# Patient Record
Sex: FEMALE | Race: WHITE | NOT HISPANIC OR LATINO | Employment: OTHER | URBAN - METROPOLITAN AREA
[De-identification: names, ages, dates, MRNs, and addresses within clinical notes are randomized per-mention and may not be internally consistent; named-entity substitution may affect disease eponyms.]

---

## 2019-03-14 ENCOUNTER — TELEPHONE (OUTPATIENT)
Dept: ORTHOPEDIC SURGERY | Facility: CLINIC | Age: 84
End: 2019-03-14

## 2019-03-14 ENCOUNTER — APPOINTMENT (OUTPATIENT)
Dept: GENERAL RADIOLOGY | Facility: HOSPITAL | Age: 84
End: 2019-03-14

## 2019-03-14 ENCOUNTER — HOSPITAL ENCOUNTER (INPATIENT)
Facility: HOSPITAL | Age: 84
LOS: 6 days | Discharge: NURSING, REHAB OR LONG-TERM CARE | End: 2019-03-20
Attending: EMERGENCY MEDICINE | Admitting: ORTHOPAEDIC SURGERY

## 2019-03-14 DIAGNOSIS — Z74.09 IMPAIRED FUNCTIONAL MOBILITY AND ACTIVITY TOLERANCE: ICD-10-CM

## 2019-03-14 DIAGNOSIS — M97.8XXA PERIPROSTHETIC FRACTURE OF FEMUR AT TIP OF PROSTHESIS, INITIAL ENCOUNTER: Primary | ICD-10-CM

## 2019-03-14 DIAGNOSIS — Z96.649 PERIPROSTHETIC FRACTURE OF FEMUR AT TIP OF PROSTHESIS, INITIAL ENCOUNTER: Primary | ICD-10-CM

## 2019-03-14 PROBLEM — I95.9 HYPOTENSION: Status: ACTIVE | Noted: 2019-03-14

## 2019-03-14 PROBLEM — D72.829 LEUKOCYTOSIS: Status: ACTIVE | Noted: 2019-03-14

## 2019-03-14 PROBLEM — S42.412A CLOSED SUPRACONDYLAR FRACTURE OF LEFT HUMERUS: Status: ACTIVE | Noted: 2019-03-14

## 2019-03-14 LAB
ALBUMIN SERPL-MCNC: 4.1 G/DL (ref 3.5–5.2)
ALBUMIN/GLOB SERPL: 1.7 G/DL
ALP SERPL-CCNC: 49 U/L (ref 39–117)
ALT SERPL W P-5'-P-CCNC: 19 U/L (ref 1–33)
ANION GAP SERPL CALCULATED.3IONS-SCNC: 13.1 MMOL/L
APTT PPP: 24.7 SECONDS (ref 22.7–35.4)
AST SERPL-CCNC: 19 U/L (ref 1–32)
BASOPHILS # BLD AUTO: 0.05 10*3/MM3 (ref 0–0.2)
BASOPHILS NFR BLD AUTO: 0.3 % (ref 0–1.5)
BILIRUB SERPL-MCNC: 0.3 MG/DL (ref 0.1–1.2)
BUN BLD-MCNC: 22 MG/DL (ref 8–23)
BUN/CREAT SERPL: 24.4 (ref 7–25)
CALCIUM SPEC-SCNC: 9.7 MG/DL (ref 8.6–10.5)
CHLORIDE SERPL-SCNC: 100 MMOL/L (ref 98–107)
CO2 SERPL-SCNC: 25.9 MMOL/L (ref 22–29)
CREAT BLD-MCNC: 0.9 MG/DL (ref 0.57–1)
DEPRECATED RDW RBC AUTO: 42.3 FL (ref 37–54)
EOSINOPHIL # BLD AUTO: 0.11 10*3/MM3 (ref 0–0.4)
EOSINOPHIL NFR BLD AUTO: 0.6 % (ref 0.3–6.2)
ERYTHROCYTE [DISTWIDTH] IN BLOOD BY AUTOMATED COUNT: 12.4 % (ref 12.3–15.4)
GFR SERPL CREATININE-BSD FRML MDRD: 60 ML/MIN/1.73
GLOBULIN UR ELPH-MCNC: 2.4 GM/DL
GLUCOSE BLD-MCNC: 133 MG/DL (ref 65–99)
HCT VFR BLD AUTO: 35.2 % (ref 34–46.6)
HGB BLD-MCNC: 11.3 G/DL (ref 12–15.9)
IMM GRANULOCYTES # BLD AUTO: 0.13 10*3/MM3 (ref 0–0.05)
IMM GRANULOCYTES NFR BLD AUTO: 0.7 % (ref 0–0.5)
INR PPP: 1.1 (ref 0.9–1.1)
LYMPHOCYTES # BLD AUTO: 2.04 10*3/MM3 (ref 0.7–3.1)
LYMPHOCYTES NFR BLD AUTO: 11.6 % (ref 19.6–45.3)
MCH RBC QN AUTO: 29.9 PG (ref 26.6–33)
MCHC RBC AUTO-ENTMCNC: 32.1 G/DL (ref 31.5–35.7)
MCV RBC AUTO: 93.1 FL (ref 79–97)
MONOCYTES # BLD AUTO: 0.68 10*3/MM3 (ref 0.1–0.9)
MONOCYTES NFR BLD AUTO: 3.9 % (ref 5–12)
NEUTROPHILS # BLD AUTO: 14.54 10*3/MM3 (ref 1.4–7)
NEUTROPHILS NFR BLD AUTO: 82.9 % (ref 42.7–76)
NRBC BLD AUTO-RTO: 0 /100 WBC (ref 0–0)
PLATELET # BLD AUTO: 285 10*3/MM3 (ref 140–450)
PMV BLD AUTO: 10.6 FL (ref 6–12)
POTASSIUM BLD-SCNC: 4.9 MMOL/L (ref 3.5–5.2)
PROT SERPL-MCNC: 6.5 G/DL (ref 6–8.5)
PROTHROMBIN TIME: 14 SECONDS (ref 11.7–14.2)
RBC # BLD AUTO: 3.78 10*6/MM3 (ref 3.77–5.28)
SODIUM BLD-SCNC: 139 MMOL/L (ref 136–145)
WBC NRBC COR # BLD: 17.55 10*3/MM3 (ref 3.4–10.8)

## 2019-03-14 PROCEDURE — 25010000002 MORPHINE PER 10 MG: Performed by: INTERNAL MEDICINE

## 2019-03-14 PROCEDURE — 73060 X-RAY EXAM OF HUMERUS: CPT

## 2019-03-14 PROCEDURE — 85025 COMPLETE CBC W/AUTO DIFF WBC: CPT | Performed by: EMERGENCY MEDICINE

## 2019-03-14 PROCEDURE — 85610 PROTHROMBIN TIME: CPT | Performed by: EMERGENCY MEDICINE

## 2019-03-14 PROCEDURE — 99285 EMERGENCY DEPT VISIT HI MDM: CPT

## 2019-03-14 PROCEDURE — 25010000002 HYDROMORPHONE PER 4 MG: Performed by: EMERGENCY MEDICINE

## 2019-03-14 PROCEDURE — 73552 X-RAY EXAM OF FEMUR 2/>: CPT

## 2019-03-14 PROCEDURE — 25010000002 ONDANSETRON PER 1 MG: Performed by: EMERGENCY MEDICINE

## 2019-03-14 PROCEDURE — 80053 COMPREHEN METABOLIC PANEL: CPT | Performed by: EMERGENCY MEDICINE

## 2019-03-14 PROCEDURE — 85730 THROMBOPLASTIN TIME PARTIAL: CPT | Performed by: EMERGENCY MEDICINE

## 2019-03-14 RX ORDER — ASPIRIN 81 MG/1
81 TABLET, CHEWABLE ORAL DAILY
Status: ON HOLD | COMMUNITY
End: 2019-03-20 | Stop reason: SDUPTHER

## 2019-03-14 RX ORDER — LISINOPRIL 40 MG/1
40 TABLET ORAL DAILY
COMMUNITY
End: 2019-03-20 | Stop reason: HOSPADM

## 2019-03-14 RX ORDER — HYDROCODONE BITARTRATE AND ACETAMINOPHEN 5; 325 MG/1; MG/1
1 TABLET ORAL EVERY 6 HOURS PRN
Status: DISCONTINUED | OUTPATIENT
Start: 2019-03-14 | End: 2019-03-20 | Stop reason: HOSPADM

## 2019-03-14 RX ORDER — SODIUM CHLORIDE 9 MG/ML
125 INJECTION, SOLUTION INTRAVENOUS CONTINUOUS
Status: DISCONTINUED | OUTPATIENT
Start: 2019-03-14 | End: 2019-03-15

## 2019-03-14 RX ORDER — MORPHINE SULFATE 2 MG/ML
4 INJECTION, SOLUTION INTRAMUSCULAR; INTRAVENOUS ONCE
Status: DISCONTINUED | OUTPATIENT
Start: 2019-03-14 | End: 2019-03-14

## 2019-03-14 RX ORDER — PROPRANOLOL HYDROCHLORIDE 40 MG/1
40 TABLET ORAL DAILY
Status: DISCONTINUED | OUTPATIENT
Start: 2019-03-14 | End: 2019-03-20 | Stop reason: HOSPADM

## 2019-03-14 RX ORDER — SODIUM CHLORIDE 0.9 % (FLUSH) 0.9 %
3-10 SYRINGE (ML) INJECTION AS NEEDED
Status: DISCONTINUED | OUTPATIENT
Start: 2019-03-14 | End: 2019-03-20 | Stop reason: HOSPADM

## 2019-03-14 RX ORDER — ONDANSETRON 2 MG/ML
4 INJECTION INTRAMUSCULAR; INTRAVENOUS ONCE
Status: COMPLETED | OUTPATIENT
Start: 2019-03-14 | End: 2019-03-14

## 2019-03-14 RX ORDER — ALBUTEROL SULFATE 2.5 MG/3ML
2.5 SOLUTION RESPIRATORY (INHALATION) EVERY 6 HOURS PRN
Status: DISCONTINUED | OUTPATIENT
Start: 2019-03-14 | End: 2019-03-20 | Stop reason: HOSPADM

## 2019-03-14 RX ORDER — LANOLIN ALCOHOL/MO/W.PET/CERES
400 CREAM (GRAM) TOPICAL DAILY
COMMUNITY

## 2019-03-14 RX ORDER — SODIUM CHLORIDE 0.9 % (FLUSH) 0.9 %
10 SYRINGE (ML) INJECTION AS NEEDED
Status: DISCONTINUED | OUTPATIENT
Start: 2019-03-14 | End: 2019-03-20 | Stop reason: HOSPADM

## 2019-03-14 RX ORDER — PROPRANOLOL HYDROCHLORIDE 40 MG/1
40 TABLET ORAL DAILY
COMMUNITY
End: 2019-12-11 | Stop reason: SDUPTHER

## 2019-03-14 RX ORDER — AMLODIPINE BESYLATE 5 MG/1
10 TABLET ORAL DAILY
Status: CANCELLED | OUTPATIENT
Start: 2019-03-14

## 2019-03-14 RX ORDER — SODIUM CHLORIDE 0.9 % (FLUSH) 0.9 %
3 SYRINGE (ML) INJECTION EVERY 12 HOURS SCHEDULED
Status: DISCONTINUED | OUTPATIENT
Start: 2019-03-14 | End: 2019-03-20 | Stop reason: HOSPADM

## 2019-03-14 RX ORDER — HYDROMORPHONE HYDROCHLORIDE 1 MG/ML
0.5 INJECTION, SOLUTION INTRAMUSCULAR; INTRAVENOUS; SUBCUTANEOUS ONCE
Status: COMPLETED | OUTPATIENT
Start: 2019-03-14 | End: 2019-03-14

## 2019-03-14 RX ORDER — LISINOPRIL 40 MG/1
40 TABLET ORAL DAILY
Status: CANCELLED | OUTPATIENT
Start: 2019-03-14

## 2019-03-14 RX ORDER — AMLODIPINE BESYLATE 10 MG/1
10 TABLET ORAL DAILY
COMMUNITY
End: 2019-03-20 | Stop reason: HOSPADM

## 2019-03-14 RX ORDER — PHENOL 1.4 %
600 AEROSOL, SPRAY (ML) MUCOUS MEMBRANE DAILY
COMMUNITY
End: 2021-10-19

## 2019-03-14 RX ORDER — MORPHINE SULFATE 2 MG/ML
2 INJECTION, SOLUTION INTRAMUSCULAR; INTRAVENOUS EVERY 4 HOURS PRN
Status: DISCONTINUED | OUTPATIENT
Start: 2019-03-14 | End: 2019-03-20 | Stop reason: HOSPADM

## 2019-03-14 RX ADMIN — ONDANSETRON HYDROCHLORIDE 4 MG: 2 SOLUTION INTRAMUSCULAR; INTRAVENOUS at 12:51

## 2019-03-14 RX ADMIN — SODIUM CHLORIDE 500 ML: 9 INJECTION, SOLUTION INTRAVENOUS at 14:32

## 2019-03-14 RX ADMIN — HYDROMORPHONE HYDROCHLORIDE 0.5 MG: 1 INJECTION, SOLUTION INTRAMUSCULAR; INTRAVENOUS; SUBCUTANEOUS at 12:51

## 2019-03-14 RX ADMIN — MORPHINE SULFATE 2 MG: 2 INJECTION, SOLUTION INTRAMUSCULAR; INTRAVENOUS at 22:17

## 2019-03-14 RX ADMIN — SODIUM CHLORIDE 125 ML/HR: 9 INJECTION, SOLUTION INTRAVENOUS at 13:44

## 2019-03-14 RX ADMIN — SODIUM CHLORIDE, PRESERVATIVE FREE 3 ML: 5 INJECTION INTRAVENOUS at 20:00

## 2019-03-14 RX ADMIN — MORPHINE SULFATE 2 MG: 2 INJECTION, SOLUTION INTRAMUSCULAR; INTRAVENOUS at 17:56

## 2019-03-15 ENCOUNTER — APPOINTMENT (OUTPATIENT)
Dept: GENERAL RADIOLOGY | Facility: HOSPITAL | Age: 84
End: 2019-03-15

## 2019-03-15 PROBLEM — N17.9 AKI (ACUTE KIDNEY INJURY) (HCC): Status: ACTIVE | Noted: 2019-03-15

## 2019-03-15 PROBLEM — D62 ACUTE POST-HEMORRHAGIC ANEMIA: Status: ACTIVE | Noted: 2019-03-15

## 2019-03-15 LAB
ABO GROUP BLD: NORMAL
ANION GAP SERPL CALCULATED.3IONS-SCNC: 13.9 MMOL/L
BASOPHILS # BLD AUTO: 0.02 10*3/MM3 (ref 0–0.2)
BASOPHILS NFR BLD AUTO: 0.2 % (ref 0–1.5)
BLD GP AB SCN SERPL QL: NEGATIVE
BUN BLD-MCNC: 30 MG/DL (ref 8–23)
BUN/CREAT SERPL: 22.1 (ref 7–25)
CALCIUM SPEC-SCNC: 8.5 MG/DL (ref 8.6–10.5)
CHLORIDE SERPL-SCNC: 102 MMOL/L (ref 98–107)
CO2 SERPL-SCNC: 20.1 MMOL/L (ref 22–29)
CREAT BLD-MCNC: 1.36 MG/DL (ref 0.57–1)
DEPRECATED RDW RBC AUTO: 45.5 FL (ref 37–54)
EOSINOPHIL # BLD AUTO: 0 10*3/MM3 (ref 0–0.4)
EOSINOPHIL NFR BLD AUTO: 0 % (ref 0.3–6.2)
ERYTHROCYTE [DISTWIDTH] IN BLOOD BY AUTOMATED COUNT: 12.8 % (ref 12.3–15.4)
GFR SERPL CREATININE-BSD FRML MDRD: 37 ML/MIN/1.73
GLUCOSE BLD-MCNC: 115 MG/DL (ref 65–99)
HCT VFR BLD AUTO: 23.1 % (ref 34–46.6)
HGB BLD-MCNC: 7.1 G/DL (ref 12–15.9)
IMM GRANULOCYTES # BLD AUTO: 0.07 10*3/MM3 (ref 0–0.05)
IMM GRANULOCYTES NFR BLD AUTO: 0.6 % (ref 0–0.5)
LYMPHOCYTES # BLD AUTO: 2.62 10*3/MM3 (ref 0.7–3.1)
LYMPHOCYTES NFR BLD AUTO: 21.8 % (ref 19.6–45.3)
MCH RBC QN AUTO: 30.2 PG (ref 26.6–33)
MCHC RBC AUTO-ENTMCNC: 30.7 G/DL (ref 31.5–35.7)
MCV RBC AUTO: 98.3 FL (ref 79–97)
MONOCYTES # BLD AUTO: 1.18 10*3/MM3 (ref 0.1–0.9)
MONOCYTES NFR BLD AUTO: 9.8 % (ref 5–12)
NEUTROPHILS # BLD AUTO: 8.11 10*3/MM3 (ref 1.4–7)
NEUTROPHILS NFR BLD AUTO: 67.6 % (ref 42.7–76)
NRBC BLD AUTO-RTO: 0 /100 WBC (ref 0–0)
PLATELET # BLD AUTO: 154 10*3/MM3 (ref 140–450)
PMV BLD AUTO: 10.4 FL (ref 6–12)
POTASSIUM BLD-SCNC: 5 MMOL/L (ref 3.5–5.2)
RBC # BLD AUTO: 2.35 10*6/MM3 (ref 3.77–5.28)
RH BLD: POSITIVE
SODIUM BLD-SCNC: 136 MMOL/L (ref 136–145)
T&S EXPIRATION DATE: NORMAL
WBC NRBC COR # BLD: 12 10*3/MM3 (ref 3.4–10.8)

## 2019-03-15 PROCEDURE — 85025 COMPLETE CBC W/AUTO DIFF WBC: CPT | Performed by: INTERNAL MEDICINE

## 2019-03-15 PROCEDURE — 80048 BASIC METABOLIC PNL TOTAL CA: CPT | Performed by: NURSE PRACTITIONER

## 2019-03-15 PROCEDURE — P9016 RBC LEUKOCYTES REDUCED: HCPCS

## 2019-03-15 PROCEDURE — 36430 TRANSFUSION BLD/BLD COMPNT: CPT

## 2019-03-15 PROCEDURE — 71045 X-RAY EXAM CHEST 1 VIEW: CPT

## 2019-03-15 PROCEDURE — 86901 BLOOD TYPING SEROLOGIC RH(D): CPT

## 2019-03-15 PROCEDURE — 86901 BLOOD TYPING SEROLOGIC RH(D): CPT | Performed by: ORTHOPAEDIC SURGERY

## 2019-03-15 PROCEDURE — 86850 RBC ANTIBODY SCREEN: CPT | Performed by: ORTHOPAEDIC SURGERY

## 2019-03-15 PROCEDURE — 86900 BLOOD TYPING SEROLOGIC ABO: CPT

## 2019-03-15 PROCEDURE — 25010000002 MORPHINE PER 10 MG: Performed by: INTERNAL MEDICINE

## 2019-03-15 PROCEDURE — 86923 COMPATIBILITY TEST ELECTRIC: CPT

## 2019-03-15 PROCEDURE — 86900 BLOOD TYPING SEROLOGIC ABO: CPT | Performed by: ORTHOPAEDIC SURGERY

## 2019-03-15 RX ORDER — SODIUM CHLORIDE 9 MG/ML
75 INJECTION, SOLUTION INTRAVENOUS CONTINUOUS
Status: DISCONTINUED | OUTPATIENT
Start: 2019-03-15 | End: 2019-03-17

## 2019-03-15 RX ORDER — CYCLOBENZAPRINE HCL 10 MG
5 TABLET ORAL 3 TIMES DAILY PRN
Status: DISCONTINUED | OUTPATIENT
Start: 2019-03-15 | End: 2019-03-20 | Stop reason: HOSPADM

## 2019-03-15 RX ADMIN — MORPHINE SULFATE 2 MG: 2 INJECTION, SOLUTION INTRAMUSCULAR; INTRAVENOUS at 02:28

## 2019-03-15 RX ADMIN — HYDROCODONE BITARTRATE AND ACETAMINOPHEN 1 TABLET: 5; 325 TABLET ORAL at 05:07

## 2019-03-15 RX ADMIN — HYDROCODONE BITARTRATE AND ACETAMINOPHEN 1 TABLET: 5; 325 TABLET ORAL at 23:51

## 2019-03-15 RX ADMIN — CYCLOBENZAPRINE 5 MG: 10 TABLET, FILM COATED ORAL at 22:54

## 2019-03-15 RX ADMIN — SODIUM CHLORIDE 125 ML/HR: 9 INJECTION, SOLUTION INTRAVENOUS at 02:31

## 2019-03-15 RX ADMIN — HYDROCODONE BITARTRATE AND ACETAMINOPHEN 1 TABLET: 5; 325 TABLET ORAL at 19:40

## 2019-03-15 RX ADMIN — SODIUM CHLORIDE 125 ML/HR: 9 INJECTION, SOLUTION INTRAVENOUS at 10:06

## 2019-03-15 RX ADMIN — MORPHINE SULFATE 2 MG: 2 INJECTION, SOLUTION INTRAMUSCULAR; INTRAVENOUS at 15:12

## 2019-03-15 RX ADMIN — MORPHINE SULFATE 2 MG: 2 INJECTION, SOLUTION INTRAMUSCULAR; INTRAVENOUS at 21:40

## 2019-03-15 RX ADMIN — MORPHINE SULFATE 2 MG: 2 INJECTION, SOLUTION INTRAMUSCULAR; INTRAVENOUS at 10:06

## 2019-03-15 RX ADMIN — SODIUM CHLORIDE 75 ML/HR: 9 INJECTION, SOLUTION INTRAVENOUS at 21:41

## 2019-03-15 RX ADMIN — MORPHINE SULFATE 2 MG: 2 INJECTION, SOLUTION INTRAMUSCULAR; INTRAVENOUS at 06:26

## 2019-03-15 RX ADMIN — CYCLOBENZAPRINE 5 MG: 10 TABLET, FILM COATED ORAL at 15:12

## 2019-03-16 ENCOUNTER — ANESTHESIA EVENT (OUTPATIENT)
Dept: PERIOP | Facility: HOSPITAL | Age: 84
End: 2019-03-16

## 2019-03-16 ENCOUNTER — APPOINTMENT (OUTPATIENT)
Dept: GENERAL RADIOLOGY | Facility: HOSPITAL | Age: 84
End: 2019-03-16

## 2019-03-16 ENCOUNTER — ANESTHESIA (OUTPATIENT)
Dept: PERIOP | Facility: HOSPITAL | Age: 84
End: 2019-03-16

## 2019-03-16 LAB
ABO + RH BLD: NORMAL
ABO + RH BLD: NORMAL
ANION GAP SERPL CALCULATED.3IONS-SCNC: 12.7 MMOL/L
BASOPHILS # BLD AUTO: 0.03 10*3/MM3 (ref 0–0.2)
BASOPHILS NFR BLD AUTO: 0.2 % (ref 0–1.5)
BH BB BLOOD EXPIRATION DATE: NORMAL
BH BB BLOOD EXPIRATION DATE: NORMAL
BH BB BLOOD TYPE BARCODE: 7300
BH BB BLOOD TYPE BARCODE: 7300
BH BB DISPENSE STATUS: NORMAL
BH BB DISPENSE STATUS: NORMAL
BH BB PRODUCT CODE: NORMAL
BH BB PRODUCT CODE: NORMAL
BH BB UNIT NUMBER: NORMAL
BH BB UNIT NUMBER: NORMAL
BUN BLD-MCNC: 28 MG/DL (ref 8–23)
BUN/CREAT SERPL: 31.1 (ref 7–25)
CALCIUM SPEC-SCNC: 8.5 MG/DL (ref 8.6–10.5)
CHLORIDE SERPL-SCNC: 102 MMOL/L (ref 98–107)
CO2 SERPL-SCNC: 19.3 MMOL/L (ref 22–29)
CREAT BLD-MCNC: 0.9 MG/DL (ref 0.57–1)
DEPRECATED RDW RBC AUTO: 46.5 FL (ref 37–54)
EOSINOPHIL # BLD AUTO: 0.15 10*3/MM3 (ref 0–0.4)
EOSINOPHIL NFR BLD AUTO: 1.1 % (ref 0.3–6.2)
ERYTHROCYTE [DISTWIDTH] IN BLOOD BY AUTOMATED COUNT: 13.7 % (ref 12.3–15.4)
GFR SERPL CREATININE-BSD FRML MDRD: 60 ML/MIN/1.73
GLUCOSE BLD-MCNC: 118 MG/DL (ref 65–99)
HCT VFR BLD AUTO: 29.6 % (ref 34–46.6)
HCT VFR BLD AUTO: 30.2 % (ref 34–46.6)
HGB BLD-MCNC: 9.5 G/DL (ref 12–15.9)
HGB BLD-MCNC: 9.9 G/DL (ref 12–15.9)
IMM GRANULOCYTES # BLD AUTO: 0.07 10*3/MM3 (ref 0–0.05)
IMM GRANULOCYTES NFR BLD AUTO: 0.5 % (ref 0–0.5)
LYMPHOCYTES # BLD AUTO: 2.46 10*3/MM3 (ref 0.7–3.1)
LYMPHOCYTES NFR BLD AUTO: 17.3 % (ref 19.6–45.3)
MCH RBC QN AUTO: 30 PG (ref 26.6–33)
MCHC RBC AUTO-ENTMCNC: 32.8 G/DL (ref 31.5–35.7)
MCV RBC AUTO: 91.5 FL (ref 79–97)
MONOCYTES # BLD AUTO: 1.7 10*3/MM3 (ref 0.1–0.9)
MONOCYTES NFR BLD AUTO: 11.9 % (ref 5–12)
NEUTROPHILS # BLD AUTO: 9.84 10*3/MM3 (ref 1.4–7)
NEUTROPHILS NFR BLD AUTO: 69 % (ref 42.7–76)
NRBC BLD AUTO-RTO: 0 /100 WBC (ref 0–0)
PLATELET # BLD AUTO: 117 10*3/MM3 (ref 140–450)
PMV BLD AUTO: 10.3 FL (ref 6–12)
POTASSIUM BLD-SCNC: 4.5 MMOL/L (ref 3.5–5.2)
RBC # BLD AUTO: 3.3 10*6/MM3 (ref 3.77–5.28)
SODIUM BLD-SCNC: 134 MMOL/L (ref 136–145)
UNIT  ABO: NORMAL
UNIT  ABO: NORMAL
UNIT  RH: NORMAL
UNIT  RH: NORMAL
WBC NRBC COR # BLD: 14.25 10*3/MM3 (ref 3.4–10.8)

## 2019-03-16 PROCEDURE — C1713 ANCHOR/SCREW BN/BN,TIS/BN: HCPCS | Performed by: ORTHOPAEDIC SURGERY

## 2019-03-16 PROCEDURE — 82803 BLOOD GASES ANY COMBINATION: CPT

## 2019-03-16 PROCEDURE — 23600 CLTX PROX HUMRL FX W/O MNPJ: CPT | Performed by: ORTHOPAEDIC SURGERY

## 2019-03-16 PROCEDURE — 25010000002 PHENYLEPHRINE PER 1 ML: Performed by: NURSE ANESTHETIST, CERTIFIED REGISTERED

## 2019-03-16 PROCEDURE — 25010000003 CEFAZOLIN IN DEXTROSE 2-4 GM/100ML-% SOLUTION: Performed by: ORTHOPAEDIC SURGERY

## 2019-03-16 PROCEDURE — 25010000002 MIDAZOLAM PER 1 MG: Performed by: ANESTHESIOLOGY

## 2019-03-16 PROCEDURE — 85025 COMPLETE CBC W/AUTO DIFF WBC: CPT | Performed by: INTERNAL MEDICINE

## 2019-03-16 PROCEDURE — 25010000002 DEXAMETHASONE PER 1 MG: Performed by: NURSE ANESTHETIST, CERTIFIED REGISTERED

## 2019-03-16 PROCEDURE — 82947 ASSAY GLUCOSE BLOOD QUANT: CPT

## 2019-03-16 PROCEDURE — 85014 HEMATOCRIT: CPT | Performed by: INTERNAL MEDICINE

## 2019-03-16 PROCEDURE — 25010000002 MORPHINE PER 10 MG: Performed by: INTERNAL MEDICINE

## 2019-03-16 PROCEDURE — 76000 FLUOROSCOPY <1 HR PHYS/QHP: CPT

## 2019-03-16 PROCEDURE — 99223 1ST HOSP IP/OBS HIGH 75: CPT | Performed by: ORTHOPAEDIC SURGERY

## 2019-03-16 PROCEDURE — 27506 TREATMENT OF THIGH FRACTURE: CPT | Performed by: ORTHOPAEDIC SURGERY

## 2019-03-16 PROCEDURE — P9016 RBC LEUKOCYTES REDUCED: HCPCS

## 2019-03-16 PROCEDURE — 25010000002 NEOSTIGMINE PER 0.5 MG: Performed by: NURSE ANESTHETIST, CERTIFIED REGISTERED

## 2019-03-16 PROCEDURE — 0QS904Z REPOSITION LEFT FEMORAL SHAFT WITH INTERNAL FIXATION DEVICE, OPEN APPROACH: ICD-10-PCS | Performed by: ORTHOPAEDIC SURGERY

## 2019-03-16 PROCEDURE — 85018 HEMOGLOBIN: CPT

## 2019-03-16 PROCEDURE — 86900 BLOOD TYPING SEROLOGIC ABO: CPT

## 2019-03-16 PROCEDURE — 25010000002 PROPOFOL 10 MG/ML EMULSION: Performed by: NURSE ANESTHETIST, CERTIFIED REGISTERED

## 2019-03-16 PROCEDURE — 80048 BASIC METABOLIC PNL TOTAL CA: CPT | Performed by: INTERNAL MEDICINE

## 2019-03-16 PROCEDURE — 25010000002 FENTANYL CITRATE (PF) 100 MCG/2ML SOLUTION: Performed by: ANESTHESIOLOGY

## 2019-03-16 PROCEDURE — 73552 X-RAY EXAM OF FEMUR 2/>: CPT

## 2019-03-16 PROCEDURE — 36430 TRANSFUSION BLD/BLD COMPNT: CPT

## 2019-03-16 PROCEDURE — L1830 KO IMMOB CANVAS LONG PRE OTS: HCPCS | Performed by: ORTHOPAEDIC SURGERY

## 2019-03-16 PROCEDURE — 85014 HEMATOCRIT: CPT

## 2019-03-16 PROCEDURE — 85018 HEMOGLOBIN: CPT | Performed by: INTERNAL MEDICINE

## 2019-03-16 DEVICE — NCB SCREW 5.0   L = 36
Type: IMPLANTABLE DEVICE | Site: FEMUR | Status: FUNCTIONAL
Brand: NCB®

## 2019-03-16 DEVICE — NCB SCREW 5.0   L = 80
Type: IMPLANTABLE DEVICE | Site: FEMUR | Status: FUNCTIONAL
Brand: NCB®

## 2019-03-16 DEVICE — NCB SCREW D 4.0 SELF-TAPPING, 44
Type: IMPLANTABLE DEVICE | Site: FEMUR | Status: FUNCTIONAL
Brand: NCB®

## 2019-03-16 DEVICE — NCB SCREW D 4.0 SELF-TAPPING, 34
Type: IMPLANTABLE DEVICE | Site: FEMUR | Status: FUNCTIONAL
Brand: NCB®

## 2019-03-16 DEVICE — NCB SCREW 5.0   L = 65
Type: IMPLANTABLE DEVICE | Site: FEMUR | Status: FUNCTIONAL
Brand: NCB®

## 2019-03-16 DEVICE — NCB SCREW 5.0   L = 75
Type: IMPLANTABLE DEVICE | Site: FEMUR | Status: FUNCTIONAL
Brand: NCB®

## 2019-03-16 DEVICE — IMPLANTABLE DEVICE: Type: IMPLANTABLE DEVICE | Site: FEMUR | Status: FUNCTIONAL

## 2019-03-16 DEVICE — NCB SCREW D 4.0 SELF-TAPPING, 36
Type: IMPLANTABLE DEVICE | Site: FEMUR | Status: FUNCTIONAL
Brand: NCB®

## 2019-03-16 DEVICE — NCB SCREW D 4.0 SELF-TAPPING, 30
Type: IMPLANTABLE DEVICE | Site: FEMUR | Status: FUNCTIONAL
Brand: NCB®

## 2019-03-16 DEVICE — NCB CANCELLOUS SCREW 5.0 32 L=75
Type: IMPLANTABLE DEVICE | Site: FEMUR | Status: FUNCTIONAL
Brand: NCB®

## 2019-03-16 DEVICE — NCB SCREW 5.0   L = 70
Type: IMPLANTABLE DEVICE | Site: FEMUR | Status: FUNCTIONAL
Brand: NCB®

## 2019-03-16 DEVICE — NCB LOCKING CAP
Type: IMPLANTABLE DEVICE | Site: FEMUR | Status: FUNCTIONAL
Brand: NCB®

## 2019-03-16 DEVICE — NCB SCREW 5.0   L = 55
Type: IMPLANTABLE DEVICE | Site: FEMUR | Status: FUNCTIONAL
Brand: NCB®

## 2019-03-16 DEVICE — CABL CERCLG GTR COCR 1.8MM 63.5CM: Type: IMPLANTABLE DEVICE | Site: FEMUR | Status: FUNCTIONAL

## 2019-03-16 DEVICE — NCB SCREW D 4.0 SELF-TAPPING, 32
Type: IMPLANTABLE DEVICE | Site: FEMUR | Status: FUNCTIONAL
Brand: NCB®

## 2019-03-16 DEVICE — NCB SCREW 5.0   L = 44
Type: IMPLANTABLE DEVICE | Site: FEMUR | Status: FUNCTIONAL
Brand: NCB®

## 2019-03-16 DEVICE — NCB CLAMP-SCREW
Type: IMPLANTABLE DEVICE | Site: FEMUR | Status: FUNCTIONAL
Brand: NCB®

## 2019-03-16 DEVICE — NCB PP DIST FEM PLATE, L,18 H, L. 355MM
Type: IMPLANTABLE DEVICE | Site: FEMUR | Status: FUNCTIONAL
Brand: NCB®

## 2019-03-16 RX ORDER — FLUMAZENIL 0.1 MG/ML
0.2 INJECTION INTRAVENOUS AS NEEDED
Status: DISCONTINUED | OUTPATIENT
Start: 2019-03-16 | End: 2019-03-16 | Stop reason: HOSPADM

## 2019-03-16 RX ORDER — HYDROMORPHONE HYDROCHLORIDE 1 MG/ML
0.5 INJECTION, SOLUTION INTRAMUSCULAR; INTRAVENOUS; SUBCUTANEOUS
Status: DISCONTINUED | OUTPATIENT
Start: 2019-03-16 | End: 2019-03-16 | Stop reason: HOSPADM

## 2019-03-16 RX ORDER — OXYCODONE AND ACETAMINOPHEN 7.5; 325 MG/1; MG/1
1 TABLET ORAL EVERY 4 HOURS PRN
Status: DISCONTINUED | OUTPATIENT
Start: 2019-03-16 | End: 2019-03-20 | Stop reason: HOSPADM

## 2019-03-16 RX ORDER — FENTANYL CITRATE 50 UG/ML
INJECTION, SOLUTION INTRAMUSCULAR; INTRAVENOUS
Status: COMPLETED
Start: 2019-03-16 | End: 2019-03-16

## 2019-03-16 RX ORDER — PROPOFOL 10 MG/ML
VIAL (ML) INTRAVENOUS AS NEEDED
Status: DISCONTINUED | OUTPATIENT
Start: 2019-03-16 | End: 2019-03-16 | Stop reason: SURG

## 2019-03-16 RX ORDER — SODIUM CHLORIDE, SODIUM LACTATE, POTASSIUM CHLORIDE, CALCIUM CHLORIDE 600; 310; 30; 20 MG/100ML; MG/100ML; MG/100ML; MG/100ML
INJECTION, SOLUTION INTRAVENOUS CONTINUOUS PRN
Status: DISCONTINUED | OUTPATIENT
Start: 2019-03-16 | End: 2019-03-16 | Stop reason: SURG

## 2019-03-16 RX ORDER — EPHEDRINE SULFATE 50 MG/ML
5 INJECTION, SOLUTION INTRAVENOUS ONCE AS NEEDED
Status: DISCONTINUED | OUTPATIENT
Start: 2019-03-16 | End: 2019-03-16 | Stop reason: HOSPADM

## 2019-03-16 RX ORDER — CEFAZOLIN SODIUM 2 G/100ML
2 INJECTION, SOLUTION INTRAVENOUS EVERY 8 HOURS
Status: COMPLETED | OUTPATIENT
Start: 2019-03-16 | End: 2019-03-17

## 2019-03-16 RX ORDER — PROMETHAZINE HYDROCHLORIDE 25 MG/1
25 TABLET ORAL ONCE AS NEEDED
Status: DISCONTINUED | OUTPATIENT
Start: 2019-03-16 | End: 2019-03-16 | Stop reason: HOSPADM

## 2019-03-16 RX ORDER — NALOXONE HCL 0.4 MG/ML
0.1 VIAL (ML) INJECTION
Status: DISCONTINUED | OUTPATIENT
Start: 2019-03-16 | End: 2019-03-20 | Stop reason: HOSPADM

## 2019-03-16 RX ORDER — DEXAMETHASONE SODIUM PHOSPHATE 10 MG/ML
INJECTION INTRAMUSCULAR; INTRAVENOUS AS NEEDED
Status: DISCONTINUED | OUTPATIENT
Start: 2019-03-16 | End: 2019-03-16 | Stop reason: SURG

## 2019-03-16 RX ORDER — PROMETHAZINE HYDROCHLORIDE 25 MG/ML
12.5 INJECTION, SOLUTION INTRAMUSCULAR; INTRAVENOUS ONCE AS NEEDED
Status: CANCELLED | OUTPATIENT
Start: 2019-03-16

## 2019-03-16 RX ORDER — BISACODYL 10 MG
10 SUPPOSITORY, RECTAL RECTAL DAILY PRN
Status: DISCONTINUED | OUTPATIENT
Start: 2019-03-16 | End: 2019-03-20 | Stop reason: HOSPADM

## 2019-03-16 RX ORDER — DOCUSATE SODIUM 100 MG/1
100 CAPSULE, LIQUID FILLED ORAL 2 TIMES DAILY
Status: DISCONTINUED | OUTPATIENT
Start: 2019-03-16 | End: 2019-03-20 | Stop reason: HOSPADM

## 2019-03-16 RX ORDER — FAMOTIDINE 10 MG/ML
20 INJECTION, SOLUTION INTRAVENOUS ONCE
Status: COMPLETED | OUTPATIENT
Start: 2019-03-16 | End: 2019-03-16

## 2019-03-16 RX ORDER — SODIUM CHLORIDE, SODIUM LACTATE, POTASSIUM CHLORIDE, CALCIUM CHLORIDE 600; 310; 30; 20 MG/100ML; MG/100ML; MG/100ML; MG/100ML
100 INJECTION, SOLUTION INTRAVENOUS CONTINUOUS
Status: DISCONTINUED | OUTPATIENT
Start: 2019-03-16 | End: 2019-03-18

## 2019-03-16 RX ORDER — HYDROMORPHONE HYDROCHLORIDE 1 MG/ML
INJECTION, SOLUTION INTRAMUSCULAR; INTRAVENOUS; SUBCUTANEOUS
Status: DISPENSED
Start: 2019-03-16 | End: 2019-03-16

## 2019-03-16 RX ORDER — PROMETHAZINE HYDROCHLORIDE 25 MG/ML
12.5 INJECTION, SOLUTION INTRAMUSCULAR; INTRAVENOUS ONCE AS NEEDED
Status: DISCONTINUED | OUTPATIENT
Start: 2019-03-16 | End: 2019-03-16 | Stop reason: HOSPADM

## 2019-03-16 RX ORDER — ONDANSETRON 2 MG/ML
4 INJECTION INTRAMUSCULAR; INTRAVENOUS ONCE AS NEEDED
Status: DISCONTINUED | OUTPATIENT
Start: 2019-03-16 | End: 2019-03-16 | Stop reason: HOSPADM

## 2019-03-16 RX ORDER — ONDANSETRON 4 MG/1
4 TABLET, ORALLY DISINTEGRATING ORAL EVERY 6 HOURS PRN
Status: DISCONTINUED | OUTPATIENT
Start: 2019-03-16 | End: 2019-03-20 | Stop reason: HOSPADM

## 2019-03-16 RX ORDER — DIPHENHYDRAMINE HCL 25 MG
25 CAPSULE ORAL
Status: DISCONTINUED | OUTPATIENT
Start: 2019-03-16 | End: 2019-03-16 | Stop reason: HOSPADM

## 2019-03-16 RX ORDER — HYDROCODONE BITARTRATE AND ACETAMINOPHEN 5; 325 MG/1; MG/1
1 TABLET ORAL EVERY 4 HOURS PRN
Status: DISCONTINUED | OUTPATIENT
Start: 2019-03-16 | End: 2019-03-20 | Stop reason: HOSPADM

## 2019-03-16 RX ORDER — PROMETHAZINE HYDROCHLORIDE 25 MG/1
25 SUPPOSITORY RECTAL ONCE AS NEEDED
Status: DISCONTINUED | OUTPATIENT
Start: 2019-03-16 | End: 2019-03-16 | Stop reason: HOSPADM

## 2019-03-16 RX ORDER — OXYCODONE AND ACETAMINOPHEN 7.5; 325 MG/1; MG/1
1 TABLET ORAL ONCE AS NEEDED
Status: COMPLETED | OUTPATIENT
Start: 2019-03-16 | End: 2019-03-16

## 2019-03-16 RX ORDER — GLYCOPYRROLATE 0.2 MG/ML
INJECTION INTRAMUSCULAR; INTRAVENOUS AS NEEDED
Status: DISCONTINUED | OUTPATIENT
Start: 2019-03-16 | End: 2019-03-16 | Stop reason: SURG

## 2019-03-16 RX ORDER — FLUMAZENIL 0.1 MG/ML
0.2 INJECTION INTRAVENOUS AS NEEDED
Status: CANCELLED | OUTPATIENT
Start: 2019-03-16

## 2019-03-16 RX ORDER — LIDOCAINE HYDROCHLORIDE 10 MG/ML
0.5 INJECTION, SOLUTION EPIDURAL; INFILTRATION; INTRACAUDAL; PERINEURAL ONCE AS NEEDED
Status: DISCONTINUED | OUTPATIENT
Start: 2019-03-16 | End: 2019-03-16 | Stop reason: HOSPADM

## 2019-03-16 RX ORDER — ACETAMINOPHEN 325 MG/1
650 TABLET ORAL ONCE AS NEEDED
Status: CANCELLED | OUTPATIENT
Start: 2019-03-16

## 2019-03-16 RX ORDER — FENTANYL CITRATE 50 UG/ML
50 INJECTION, SOLUTION INTRAMUSCULAR; INTRAVENOUS
Status: DISCONTINUED | OUTPATIENT
Start: 2019-03-16 | End: 2019-03-16 | Stop reason: HOSPADM

## 2019-03-16 RX ORDER — SODIUM CHLORIDE 0.9 % (FLUSH) 0.9 %
3 SYRINGE (ML) INJECTION EVERY 12 HOURS SCHEDULED
Status: DISCONTINUED | OUTPATIENT
Start: 2019-03-16 | End: 2019-03-16 | Stop reason: HOSPADM

## 2019-03-16 RX ORDER — FENTANYL CITRATE 50 UG/ML
INJECTION, SOLUTION INTRAMUSCULAR; INTRAVENOUS
Status: DISPENSED
Start: 2019-03-16 | End: 2019-03-16

## 2019-03-16 RX ORDER — PROMETHAZINE HYDROCHLORIDE 25 MG/1
25 SUPPOSITORY RECTAL ONCE AS NEEDED
Status: CANCELLED | OUTPATIENT
Start: 2019-03-16

## 2019-03-16 RX ORDER — MIDAZOLAM HYDROCHLORIDE 1 MG/ML
2 INJECTION INTRAMUSCULAR; INTRAVENOUS
Status: DISCONTINUED | OUTPATIENT
Start: 2019-03-16 | End: 2019-03-16 | Stop reason: HOSPADM

## 2019-03-16 RX ORDER — LIDOCAINE HYDROCHLORIDE 40 MG/ML
SOLUTION TOPICAL AS NEEDED
Status: DISCONTINUED | OUTPATIENT
Start: 2019-03-16 | End: 2019-03-16 | Stop reason: SURG

## 2019-03-16 RX ORDER — LIDOCAINE HYDROCHLORIDE 40 MG/ML
SOLUTION TOPICAL
Status: COMPLETED
Start: 2019-03-16 | End: 2019-03-16

## 2019-03-16 RX ORDER — MIDAZOLAM HYDROCHLORIDE 1 MG/ML
1 INJECTION INTRAMUSCULAR; INTRAVENOUS
Status: DISCONTINUED | OUTPATIENT
Start: 2019-03-16 | End: 2019-03-16 | Stop reason: HOSPADM

## 2019-03-16 RX ORDER — HYDROMORPHONE HYDROCHLORIDE 1 MG/ML
0.5 INJECTION, SOLUTION INTRAMUSCULAR; INTRAVENOUS; SUBCUTANEOUS
Status: DISCONTINUED | OUTPATIENT
Start: 2019-03-16 | End: 2019-03-20 | Stop reason: HOSPADM

## 2019-03-16 RX ORDER — ALBUTEROL SULFATE 2.5 MG/3ML
2.5 SOLUTION RESPIRATORY (INHALATION) ONCE AS NEEDED
Status: DISCONTINUED | OUTPATIENT
Start: 2019-03-16 | End: 2019-03-16 | Stop reason: HOSPADM

## 2019-03-16 RX ORDER — CEFAZOLIN SODIUM 2 G/100ML
2 INJECTION, SOLUTION INTRAVENOUS ONCE
Status: COMPLETED | OUTPATIENT
Start: 2019-03-16 | End: 2019-03-16

## 2019-03-16 RX ORDER — OXYCODONE AND ACETAMINOPHEN 7.5; 325 MG/1; MG/1
1 TABLET ORAL ONCE AS NEEDED
Status: CANCELLED | OUTPATIENT
Start: 2019-03-16

## 2019-03-16 RX ORDER — PROMETHAZINE HYDROCHLORIDE 25 MG/1
25 TABLET ORAL ONCE AS NEEDED
Status: CANCELLED | OUTPATIENT
Start: 2019-03-16

## 2019-03-16 RX ORDER — LIDOCAINE HYDROCHLORIDE 20 MG/ML
INJECTION, SOLUTION INFILTRATION; PERINEURAL AS NEEDED
Status: DISCONTINUED | OUTPATIENT
Start: 2019-03-16 | End: 2019-03-16 | Stop reason: SURG

## 2019-03-16 RX ORDER — EPHEDRINE SULFATE 50 MG/ML
5 INJECTION, SOLUTION INTRAVENOUS ONCE AS NEEDED
Status: CANCELLED | OUTPATIENT
Start: 2019-03-16

## 2019-03-16 RX ORDER — HYDROCODONE BITARTRATE AND ACETAMINOPHEN 7.5; 325 MG/1; MG/1
1 TABLET ORAL ONCE AS NEEDED
Status: CANCELLED | OUTPATIENT
Start: 2019-03-16

## 2019-03-16 RX ORDER — NALOXONE HCL 0.4 MG/ML
0.2 VIAL (ML) INJECTION AS NEEDED
Status: DISCONTINUED | OUTPATIENT
Start: 2019-03-16 | End: 2019-03-16 | Stop reason: HOSPADM

## 2019-03-16 RX ORDER — MAGNESIUM HYDROXIDE 1200 MG/15ML
LIQUID ORAL AS NEEDED
Status: DISCONTINUED | OUTPATIENT
Start: 2019-03-16 | End: 2019-03-16 | Stop reason: HOSPADM

## 2019-03-16 RX ORDER — SODIUM CHLORIDE, SODIUM LACTATE, POTASSIUM CHLORIDE, CALCIUM CHLORIDE 600; 310; 30; 20 MG/100ML; MG/100ML; MG/100ML; MG/100ML
9 INJECTION, SOLUTION INTRAVENOUS CONTINUOUS
Status: DISCONTINUED | OUTPATIENT
Start: 2019-03-16 | End: 2019-03-17

## 2019-03-16 RX ORDER — ROCURONIUM BROMIDE 10 MG/ML
INJECTION, SOLUTION INTRAVENOUS AS NEEDED
Status: DISCONTINUED | OUTPATIENT
Start: 2019-03-16 | End: 2019-03-16 | Stop reason: SURG

## 2019-03-16 RX ORDER — HYDROCODONE BITARTRATE AND ACETAMINOPHEN 7.5; 325 MG/1; MG/1
1 TABLET ORAL ONCE AS NEEDED
Status: DISCONTINUED | OUTPATIENT
Start: 2019-03-16 | End: 2019-03-16 | Stop reason: HOSPADM

## 2019-03-16 RX ORDER — NALOXONE HCL 0.4 MG/ML
0.2 VIAL (ML) INJECTION AS NEEDED
Status: CANCELLED | OUTPATIENT
Start: 2019-03-16

## 2019-03-16 RX ORDER — ONDANSETRON 4 MG/1
4 TABLET, FILM COATED ORAL EVERY 6 HOURS PRN
Status: DISCONTINUED | OUTPATIENT
Start: 2019-03-16 | End: 2019-03-20 | Stop reason: HOSPADM

## 2019-03-16 RX ORDER — ACETAMINOPHEN 325 MG/1
325 TABLET ORAL EVERY 4 HOURS PRN
Status: DISCONTINUED | OUTPATIENT
Start: 2019-03-16 | End: 2019-03-20 | Stop reason: HOSPADM

## 2019-03-16 RX ORDER — HYDROCODONE BITARTRATE AND ACETAMINOPHEN 5; 325 MG/1; MG/1
2 TABLET ORAL EVERY 4 HOURS PRN
Status: DISCONTINUED | OUTPATIENT
Start: 2019-03-16 | End: 2019-03-20 | Stop reason: HOSPADM

## 2019-03-16 RX ORDER — FENTANYL CITRATE 50 UG/ML
100 INJECTION, SOLUTION INTRAMUSCULAR; INTRAVENOUS
Status: DISCONTINUED | OUTPATIENT
Start: 2019-03-16 | End: 2019-03-16 | Stop reason: HOSPADM

## 2019-03-16 RX ORDER — ONDANSETRON 2 MG/ML
4 INJECTION INTRAMUSCULAR; INTRAVENOUS EVERY 6 HOURS PRN
Status: DISCONTINUED | OUTPATIENT
Start: 2019-03-16 | End: 2019-03-20 | Stop reason: HOSPADM

## 2019-03-16 RX ORDER — ACETAMINOPHEN 325 MG/1
650 TABLET ORAL ONCE AS NEEDED
Status: DISCONTINUED | OUTPATIENT
Start: 2019-03-16 | End: 2019-03-16 | Stop reason: HOSPADM

## 2019-03-16 RX ORDER — PROMETHAZINE HYDROCHLORIDE 25 MG/ML
6.25 INJECTION, SOLUTION INTRAMUSCULAR; INTRAVENOUS ONCE AS NEEDED
Status: DISCONTINUED | OUTPATIENT
Start: 2019-03-16 | End: 2019-03-16 | Stop reason: HOSPADM

## 2019-03-16 RX ORDER — ONDANSETRON 2 MG/ML
4 INJECTION INTRAMUSCULAR; INTRAVENOUS ONCE AS NEEDED
Status: CANCELLED | OUTPATIENT
Start: 2019-03-16

## 2019-03-16 RX ORDER — EPHEDRINE SULFATE 50 MG/ML
INJECTION, SOLUTION INTRAVENOUS AS NEEDED
Status: DISCONTINUED | OUTPATIENT
Start: 2019-03-16 | End: 2019-03-16 | Stop reason: SURG

## 2019-03-16 RX ORDER — DIPHENHYDRAMINE HCL 25 MG
25 CAPSULE ORAL
Status: CANCELLED | OUTPATIENT
Start: 2019-03-16

## 2019-03-16 RX ORDER — ASPIRIN 325 MG
325 TABLET, DELAYED RELEASE (ENTERIC COATED) ORAL DAILY
Status: DISCONTINUED | OUTPATIENT
Start: 2019-03-17 | End: 2019-03-20 | Stop reason: HOSPADM

## 2019-03-16 RX ORDER — SODIUM CHLORIDE 0.9 % (FLUSH) 0.9 %
1-10 SYRINGE (ML) INJECTION AS NEEDED
Status: DISCONTINUED | OUTPATIENT
Start: 2019-03-16 | End: 2019-03-16 | Stop reason: HOSPADM

## 2019-03-16 RX ORDER — HYDROMORPHONE HYDROCHLORIDE 1 MG/ML
0.5 INJECTION, SOLUTION INTRAMUSCULAR; INTRAVENOUS; SUBCUTANEOUS
Status: CANCELLED | OUTPATIENT
Start: 2019-03-16

## 2019-03-16 RX ORDER — FENTANYL CITRATE 50 UG/ML
50 INJECTION, SOLUTION INTRAMUSCULAR; INTRAVENOUS
Status: CANCELLED | OUTPATIENT
Start: 2019-03-16

## 2019-03-16 RX ADMIN — CEFAZOLIN SODIUM 2 G: 2 INJECTION, SOLUTION INTRAVENOUS at 07:51

## 2019-03-16 RX ADMIN — MORPHINE SULFATE 2 MG: 2 INJECTION, SOLUTION INTRAMUSCULAR; INTRAVENOUS at 03:30

## 2019-03-16 RX ADMIN — OXYCODONE HYDROCHLORIDE AND ACETAMINOPHEN 1 TABLET: 7.5; 325 TABLET ORAL at 11:18

## 2019-03-16 RX ADMIN — SODIUM CHLORIDE, POTASSIUM CHLORIDE, SODIUM LACTATE AND CALCIUM CHLORIDE: 600; 310; 30; 20 INJECTION, SOLUTION INTRAVENOUS at 09:26

## 2019-03-16 RX ADMIN — POLYETHYLENE GLYCOL 3350 17 G: 17 POWDER, FOR SOLUTION ORAL at 17:53

## 2019-03-16 RX ADMIN — FENTANYL CITRATE 25 MCG: 50 INJECTION INTRAMUSCULAR; INTRAVENOUS at 08:36

## 2019-03-16 RX ADMIN — NEOSTIGMINE METHYLSULFATE 3 MG: 1 INJECTION INTRAMUSCULAR; INTRAVENOUS; SUBCUTANEOUS at 10:15

## 2019-03-16 RX ADMIN — GLYCOPYRROLATE 0.6 MG: 0.2 INJECTION INTRAMUSCULAR; INTRAVENOUS at 10:15

## 2019-03-16 RX ADMIN — ROCURONIUM BROMIDE 30 MG: 10 INJECTION INTRAVENOUS at 07:56

## 2019-03-16 RX ADMIN — PROPRANOLOL HYDROCHLORIDE 40 MG: 40 TABLET ORAL at 06:25

## 2019-03-16 RX ADMIN — PROPOFOL 120 MG: 10 INJECTION, EMULSION INTRAVENOUS at 07:56

## 2019-03-16 RX ADMIN — HYDROCODONE BITARTRATE AND ACETAMINOPHEN 1 TABLET: 5; 325 TABLET ORAL at 16:30

## 2019-03-16 RX ADMIN — DEXAMETHASONE SODIUM PHOSPHATE 6 MG: 10 INJECTION INTRAMUSCULAR; INTRAVENOUS at 08:02

## 2019-03-16 RX ADMIN — FAMOTIDINE 20 MG: 10 INJECTION INTRAVENOUS at 07:23

## 2019-03-16 RX ADMIN — EPHEDRINE SULFATE 10 MG: 50 INJECTION INTRAMUSCULAR; INTRAVENOUS; SUBCUTANEOUS at 08:00

## 2019-03-16 RX ADMIN — EPHEDRINE SULFATE 10 MG: 50 INJECTION INTRAMUSCULAR; INTRAVENOUS; SUBCUTANEOUS at 08:03

## 2019-03-16 RX ADMIN — SODIUM CHLORIDE, PRESERVATIVE FREE 3 ML: 5 INJECTION INTRAVENOUS at 21:53

## 2019-03-16 RX ADMIN — PHENYLEPHRINE HYDROCHLORIDE 100 MCG: 10 INJECTION INTRAVENOUS at 09:25

## 2019-03-16 RX ADMIN — HYDROCODONE BITARTRATE AND ACETAMINOPHEN 1 TABLET: 5; 325 TABLET ORAL at 20:50

## 2019-03-16 RX ADMIN — MIDAZOLAM 0.5 MG: 1 INJECTION INTRAMUSCULAR; INTRAVENOUS at 07:23

## 2019-03-16 RX ADMIN — SODIUM CHLORIDE, POTASSIUM CHLORIDE, SODIUM LACTATE AND CALCIUM CHLORIDE: 600; 310; 30; 20 INJECTION, SOLUTION INTRAVENOUS at 06:51

## 2019-03-16 RX ADMIN — LIDOCAINE HYDROCHLORIDE 1 EACH: 40 SOLUTION TOPICAL at 07:57

## 2019-03-16 RX ADMIN — CEFAZOLIN SODIUM 2 G: 2 INJECTION, SOLUTION INTRAVENOUS at 16:52

## 2019-03-16 RX ADMIN — SODIUM CHLORIDE, POTASSIUM CHLORIDE, SODIUM LACTATE AND CALCIUM CHLORIDE 9 ML/HR: 600; 310; 30; 20 INJECTION, SOLUTION INTRAVENOUS at 07:23

## 2019-03-16 RX ADMIN — LIDOCAINE HYDROCHLORIDE 100 MG: 20 INJECTION, SOLUTION INFILTRATION; PERINEURAL at 07:56

## 2019-03-16 RX ADMIN — FENTANYL CITRATE 25 MCG: 50 INJECTION INTRAMUSCULAR; INTRAVENOUS at 07:24

## 2019-03-16 RX ADMIN — PHENYLEPHRINE HYDROCHLORIDE 100 MCG: 10 INJECTION INTRAVENOUS at 08:26

## 2019-03-16 RX ADMIN — SODIUM CHLORIDE 75 ML/HR: 9 INJECTION, SOLUTION INTRAVENOUS at 11:50

## 2019-03-16 RX ADMIN — DOCUSATE SODIUM 100 MG: 100 CAPSULE, LIQUID FILLED ORAL at 20:50

## 2019-03-17 LAB
ABO + RH BLD: NORMAL
ABO + RH BLD: NORMAL
ANION GAP SERPL CALCULATED.3IONS-SCNC: 11.4 MMOL/L
BASOPHILS # BLD AUTO: 0.01 10*3/MM3 (ref 0–0.2)
BASOPHILS NFR BLD AUTO: 0.1 % (ref 0–1.5)
BH BB BLOOD EXPIRATION DATE: NORMAL
BH BB BLOOD EXPIRATION DATE: NORMAL
BH BB BLOOD TYPE BARCODE: 7300
BH BB BLOOD TYPE BARCODE: 7300
BH BB DISPENSE STATUS: NORMAL
BH BB DISPENSE STATUS: NORMAL
BH BB PRODUCT CODE: NORMAL
BH BB PRODUCT CODE: NORMAL
BH BB UNIT NUMBER: NORMAL
BH BB UNIT NUMBER: NORMAL
BUN BLD-MCNC: 22 MG/DL (ref 8–23)
BUN/CREAT SERPL: 28.9 (ref 7–25)
CALCIUM SPEC-SCNC: 8.5 MG/DL (ref 8.6–10.5)
CHLORIDE SERPL-SCNC: 100 MMOL/L (ref 98–107)
CO2 SERPL-SCNC: 21.6 MMOL/L (ref 22–29)
CREAT BLD-MCNC: 0.76 MG/DL (ref 0.57–1)
DEPRECATED RDW RBC AUTO: 45.9 FL (ref 37–54)
EOSINOPHIL # BLD AUTO: 0 10*3/MM3 (ref 0–0.4)
EOSINOPHIL NFR BLD AUTO: 0 % (ref 0.3–6.2)
ERYTHROCYTE [DISTWIDTH] IN BLOOD BY AUTOMATED COUNT: 13.7 % (ref 12.3–15.4)
GFR SERPL CREATININE-BSD FRML MDRD: 72 ML/MIN/1.73
GLUCOSE BLD-MCNC: 118 MG/DL (ref 65–99)
HCT VFR BLD AUTO: 27.7 % (ref 34–46.6)
HCT VFR BLD AUTO: 28.5 % (ref 34–46.6)
HCT VFR BLD AUTO: 29.3 % (ref 34–46.6)
HCT VFR BLD AUTO: 31.9 % (ref 34–46.6)
HGB BLD-MCNC: 10 G/DL (ref 12–15.9)
HGB BLD-MCNC: 8.9 G/DL (ref 12–15.9)
HGB BLD-MCNC: 9.4 G/DL (ref 12–15.9)
HGB BLD-MCNC: 9.7 G/DL (ref 12–15.9)
IMM GRANULOCYTES # BLD AUTO: 0.04 10*3/MM3 (ref 0–0.05)
IMM GRANULOCYTES NFR BLD AUTO: 0.3 % (ref 0–0.5)
LYMPHOCYTES # BLD AUTO: 1.35 10*3/MM3 (ref 0.7–3.1)
LYMPHOCYTES NFR BLD AUTO: 11.2 % (ref 19.6–45.3)
MCH RBC QN AUTO: 30.4 PG (ref 26.6–33)
MCHC RBC AUTO-ENTMCNC: 33 G/DL (ref 31.5–35.7)
MCV RBC AUTO: 92.2 FL (ref 79–97)
MONOCYTES # BLD AUTO: 1.35 10*3/MM3 (ref 0.1–0.9)
MONOCYTES NFR BLD AUTO: 11.2 % (ref 5–12)
NEUTROPHILS # BLD AUTO: 9.29 10*3/MM3 (ref 1.4–7)
NEUTROPHILS NFR BLD AUTO: 77.2 % (ref 42.7–76)
NRBC BLD AUTO-RTO: 0 /100 WBC (ref 0–0)
PLATELET # BLD AUTO: 100 10*3/MM3 (ref 140–450)
PMV BLD AUTO: 10.8 FL (ref 6–12)
POTASSIUM BLD-SCNC: 4.5 MMOL/L (ref 3.5–5.2)
RBC # BLD AUTO: 3.09 10*6/MM3 (ref 3.77–5.28)
SODIUM BLD-SCNC: 133 MMOL/L (ref 136–145)
UNIT  ABO: NORMAL
UNIT  ABO: NORMAL
UNIT  RH: NORMAL
UNIT  RH: NORMAL
WBC NRBC COR # BLD: 12.04 10*3/MM3 (ref 3.4–10.8)

## 2019-03-17 PROCEDURE — 80048 BASIC METABOLIC PNL TOTAL CA: CPT | Performed by: INTERNAL MEDICINE

## 2019-03-17 PROCEDURE — 97110 THERAPEUTIC EXERCISES: CPT

## 2019-03-17 PROCEDURE — 85018 HEMOGLOBIN: CPT | Performed by: INTERNAL MEDICINE

## 2019-03-17 PROCEDURE — 97162 PT EVAL MOD COMPLEX 30 MIN: CPT

## 2019-03-17 PROCEDURE — 85025 COMPLETE CBC W/AUTO DIFF WBC: CPT | Performed by: INTERNAL MEDICINE

## 2019-03-17 PROCEDURE — 25010000003 CEFAZOLIN IN DEXTROSE 2-4 GM/100ML-% SOLUTION: Performed by: ORTHOPAEDIC SURGERY

## 2019-03-17 PROCEDURE — 85014 HEMATOCRIT: CPT | Performed by: INTERNAL MEDICINE

## 2019-03-17 RX ADMIN — CEFAZOLIN SODIUM 2 G: 2 INJECTION, SOLUTION INTRAVENOUS at 00:52

## 2019-03-17 RX ADMIN — PROPRANOLOL HYDROCHLORIDE 40 MG: 40 TABLET ORAL at 08:21

## 2019-03-17 RX ADMIN — POLYETHYLENE GLYCOL 3350 17 G: 17 POWDER, FOR SOLUTION ORAL at 08:22

## 2019-03-17 RX ADMIN — SODIUM CHLORIDE, PRESERVATIVE FREE 3 ML: 5 INJECTION INTRAVENOUS at 20:56

## 2019-03-17 RX ADMIN — HYDROCODONE BITARTRATE AND ACETAMINOPHEN 2 TABLET: 5; 325 TABLET ORAL at 08:22

## 2019-03-17 RX ADMIN — DOCUSATE SODIUM 100 MG: 100 CAPSULE, LIQUID FILLED ORAL at 08:21

## 2019-03-17 RX ADMIN — HYDROCODONE BITARTRATE AND ACETAMINOPHEN 2 TABLET: 5; 325 TABLET ORAL at 21:00

## 2019-03-17 RX ADMIN — DOCUSATE SODIUM 100 MG: 100 CAPSULE, LIQUID FILLED ORAL at 20:56

## 2019-03-17 RX ADMIN — HYDROCODONE BITARTRATE AND ACETAMINOPHEN 2 TABLET: 5; 325 TABLET ORAL at 01:28

## 2019-03-17 RX ADMIN — HYDROCODONE BITARTRATE AND ACETAMINOPHEN 2 TABLET: 5; 325 TABLET ORAL at 15:33

## 2019-03-17 RX ADMIN — SODIUM CHLORIDE, PRESERVATIVE FREE 3 ML: 5 INJECTION INTRAVENOUS at 08:22

## 2019-03-17 RX ADMIN — ASPIRIN 325 MG: 325 TABLET, DELAYED RELEASE ORAL at 08:22

## 2019-03-18 LAB
ANION GAP SERPL CALCULATED.3IONS-SCNC: 8.7 MMOL/L
BASOPHILS # BLD MANUAL: 0.1 10*3/MM3 (ref 0–0.2)
BASOPHILS NFR BLD AUTO: 1 % (ref 0–1.5)
BUN BLD-MCNC: 24 MG/DL (ref 8–23)
BUN/CREAT SERPL: 32.4 (ref 7–25)
BURR CELLS BLD QL SMEAR: ABNORMAL
CALCIUM SPEC-SCNC: 8.1 MG/DL (ref 8.6–10.5)
CHLORIDE SERPL-SCNC: 101 MMOL/L (ref 98–107)
CO2 SERPL-SCNC: 24.3 MMOL/L (ref 22–29)
CREAT BLD-MCNC: 0.74 MG/DL (ref 0.57–1)
DEPRECATED RDW RBC AUTO: 46.5 FL (ref 37–54)
EOSINOPHIL # BLD MANUAL: 0.2 10*3/MM3 (ref 0–0.4)
EOSINOPHIL NFR BLD MANUAL: 2 % (ref 0.3–6.2)
ERYTHROCYTE [DISTWIDTH] IN BLOOD BY AUTOMATED COUNT: 13.7 % (ref 12.3–15.4)
GFR SERPL CREATININE-BSD FRML MDRD: 75 ML/MIN/1.73
GLUCOSE BLD-MCNC: 101 MG/DL (ref 65–99)
HCT VFR BLD AUTO: 25 % (ref 34–46.6)
HCT VFR BLD AUTO: 26.1 % (ref 34–46.6)
HCT VFR BLD AUTO: 26.2 % (ref 34–46.6)
HCT VFR BLD AUTO: 26.5 % (ref 34–46.6)
HGB BLD-MCNC: 8.1 G/DL (ref 12–15.9)
HGB BLD-MCNC: 8.5 G/DL (ref 12–15.9)
HGB BLD-MCNC: 8.6 G/DL (ref 12–15.9)
HGB BLD-MCNC: 8.6 G/DL (ref 12–15.9)
HYPOCHROMIA BLD QL: ABNORMAL
LYMPHOCYTES # BLD MANUAL: 1 10*3/MM3 (ref 0.7–3.1)
LYMPHOCYTES NFR BLD MANUAL: 10 % (ref 19.6–45.3)
LYMPHOCYTES NFR BLD MANUAL: 6 % (ref 5–12)
MCH RBC QN AUTO: 30.3 PG (ref 26.6–33)
MCHC RBC AUTO-ENTMCNC: 32.4 G/DL (ref 31.5–35.7)
MCV RBC AUTO: 93.6 FL (ref 79–97)
MONOCYTES # BLD AUTO: 0.6 10*3/MM3 (ref 0.1–0.9)
NEUTROPHILS # BLD AUTO: 8.09 10*3/MM3 (ref 1.4–7)
NEUTROPHILS NFR BLD MANUAL: 81 % (ref 42.7–76)
PLAT MORPH BLD: NORMAL
PLATELET # BLD AUTO: 117 10*3/MM3 (ref 140–450)
PMV BLD AUTO: 10.4 FL (ref 6–12)
POTASSIUM BLD-SCNC: 4.6 MMOL/L (ref 3.5–5.2)
RBC # BLD AUTO: 2.67 10*6/MM3 (ref 3.77–5.28)
SODIUM BLD-SCNC: 134 MMOL/L (ref 136–145)
WBC MORPH BLD: NORMAL
WBC NRBC COR # BLD: 9.99 10*3/MM3 (ref 3.4–10.8)

## 2019-03-18 PROCEDURE — 85007 BL SMEAR W/DIFF WBC COUNT: CPT | Performed by: INTERNAL MEDICINE

## 2019-03-18 PROCEDURE — 80048 BASIC METABOLIC PNL TOTAL CA: CPT | Performed by: INTERNAL MEDICINE

## 2019-03-18 PROCEDURE — 85018 HEMOGLOBIN: CPT | Performed by: INTERNAL MEDICINE

## 2019-03-18 PROCEDURE — 85014 HEMATOCRIT: CPT | Performed by: INTERNAL MEDICINE

## 2019-03-18 PROCEDURE — 85025 COMPLETE CBC W/AUTO DIFF WBC: CPT | Performed by: INTERNAL MEDICINE

## 2019-03-18 PROCEDURE — 97110 THERAPEUTIC EXERCISES: CPT

## 2019-03-18 RX ORDER — CALCIUM CARBONATE 200(500)MG
2 TABLET,CHEWABLE ORAL 3 TIMES DAILY PRN
Status: DISCONTINUED | OUTPATIENT
Start: 2019-03-18 | End: 2019-03-20 | Stop reason: HOSPADM

## 2019-03-18 RX ORDER — FAMOTIDINE 20 MG/1
20 TABLET, FILM COATED ORAL 2 TIMES DAILY
Status: DISCONTINUED | OUTPATIENT
Start: 2019-03-18 | End: 2019-03-20 | Stop reason: HOSPADM

## 2019-03-18 RX ADMIN — DOCUSATE SODIUM 100 MG: 100 CAPSULE, LIQUID FILLED ORAL at 08:07

## 2019-03-18 RX ADMIN — HYDROCODONE BITARTRATE AND ACETAMINOPHEN 2 TABLET: 5; 325 TABLET ORAL at 14:33

## 2019-03-18 RX ADMIN — DOCUSATE SODIUM 100 MG: 100 CAPSULE, LIQUID FILLED ORAL at 20:07

## 2019-03-18 RX ADMIN — POLYETHYLENE GLYCOL 3350 17 G: 17 POWDER, FOR SOLUTION ORAL at 08:07

## 2019-03-18 RX ADMIN — PROPRANOLOL HYDROCHLORIDE 40 MG: 40 TABLET ORAL at 08:07

## 2019-03-18 RX ADMIN — ASPIRIN 325 MG: 325 TABLET, DELAYED RELEASE ORAL at 08:07

## 2019-03-18 RX ADMIN — HYDROCODONE BITARTRATE AND ACETAMINOPHEN 2 TABLET: 5; 325 TABLET ORAL at 01:35

## 2019-03-18 RX ADMIN — HYDROCODONE BITARTRATE AND ACETAMINOPHEN 2 TABLET: 5; 325 TABLET ORAL at 18:32

## 2019-03-18 RX ADMIN — Medication 2 TABLET: at 12:01

## 2019-03-18 RX ADMIN — HYDROCODONE BITARTRATE AND ACETAMINOPHEN 2 TABLET: 5; 325 TABLET ORAL at 05:30

## 2019-03-18 RX ADMIN — SODIUM CHLORIDE, PRESERVATIVE FREE 3 ML: 5 INJECTION INTRAVENOUS at 08:07

## 2019-03-18 RX ADMIN — HYDROCODONE BITARTRATE AND ACETAMINOPHEN 2 TABLET: 5; 325 TABLET ORAL at 09:54

## 2019-03-18 RX ADMIN — FAMOTIDINE 20 MG: 20 TABLET, FILM COATED ORAL at 20:07

## 2019-03-18 RX ADMIN — SODIUM CHLORIDE, PRESERVATIVE FREE 3 ML: 5 INJECTION INTRAVENOUS at 20:07

## 2019-03-19 LAB
ANION GAP SERPL CALCULATED.3IONS-SCNC: 8.9 MMOL/L
BASE EXCESS BLDA CALC-SCNC: -3 MMOL/L (ref -5–5)
BASOPHILS # BLD AUTO: 0.03 10*3/MM3 (ref 0–0.2)
BASOPHILS NFR BLD AUTO: 0.3 % (ref 0–1.5)
BUN BLD-MCNC: 24 MG/DL (ref 8–23)
BUN/CREAT SERPL: 34.8 (ref 7–25)
CALCIUM SPEC-SCNC: 8.7 MG/DL (ref 8.6–10.5)
CHLORIDE SERPL-SCNC: 98 MMOL/L (ref 98–107)
CO2 BLDA-SCNC: 23 MMOL/L (ref 24–29)
CO2 SERPL-SCNC: 24.1 MMOL/L (ref 22–29)
CREAT BLD-MCNC: 0.69 MG/DL (ref 0.57–1)
DEPRECATED RDW RBC AUTO: 46.7 FL (ref 37–54)
EOSINOPHIL # BLD AUTO: 0.32 10*3/MM3 (ref 0–0.4)
EOSINOPHIL NFR BLD AUTO: 3.7 % (ref 0.3–6.2)
ERYTHROCYTE [DISTWIDTH] IN BLOOD BY AUTOMATED COUNT: 13.4 % (ref 12.3–15.4)
GFR SERPL CREATININE-BSD FRML MDRD: 81 ML/MIN/1.73
GLUCOSE BLD-MCNC: 96 MG/DL (ref 65–99)
GLUCOSE BLDC GLUCOMTR-MCNC: 133 MG/DL (ref 70–130)
HCO3 BLDA-SCNC: 22.2 MMOL/L (ref 22–26)
HCT VFR BLD AUTO: 26.6 % (ref 34–46.6)
HCT VFR BLD AUTO: 27.7 % (ref 34–46.6)
HCT VFR BLD AUTO: 28.3 % (ref 34–46.6)
HCT VFR BLDA CALC: 21 % (ref 38–51)
HGB BLD-MCNC: 8.7 G/DL (ref 12–15.9)
HGB BLD-MCNC: 8.8 G/DL (ref 12–15.9)
HGB BLD-MCNC: 8.9 G/DL (ref 12–15.9)
HGB BLDA-MCNC: 7.1 G/DL (ref 12–17)
IMM GRANULOCYTES # BLD AUTO: 0.04 10*3/MM3 (ref 0–0.05)
IMM GRANULOCYTES NFR BLD AUTO: 0.5 % (ref 0–0.5)
LYMPHOCYTES # BLD AUTO: 1.53 10*3/MM3 (ref 0.7–3.1)
LYMPHOCYTES NFR BLD AUTO: 17.6 % (ref 19.6–45.3)
MCH RBC QN AUTO: 31.1 PG (ref 26.6–33)
MCHC RBC AUTO-ENTMCNC: 32.7 G/DL (ref 31.5–35.7)
MCV RBC AUTO: 95 FL (ref 79–97)
MONOCYTES # BLD AUTO: 0.67 10*3/MM3 (ref 0.1–0.9)
MONOCYTES NFR BLD AUTO: 7.7 % (ref 5–12)
NEUTROPHILS # BLD AUTO: 6.12 10*3/MM3 (ref 1.4–7)
NEUTROPHILS NFR BLD AUTO: 70.2 % (ref 42.7–76)
NRBC BLD AUTO-RTO: 0 /100 WBC (ref 0–0)
OSMOLALITY SERPL: 284 MOSM/KG (ref 280–301)
OSMOLALITY UR: 503 MOSM/KG
PCO2 BLDA: 40.4 MM HG (ref 35–45)
PH BLDA: 7.35 PH UNITS (ref 7.35–7.6)
PLATELET # BLD AUTO: 161 10*3/MM3 (ref 140–450)
PMV BLD AUTO: 10.3 FL (ref 6–12)
PO2 BLDA: 169 MMHG (ref 80–105)
POTASSIUM BLD-SCNC: 4.6 MMOL/L (ref 3.5–5.2)
POTASSIUM BLDA-SCNC: 4.3 MMOL/L (ref 3.5–4.9)
RBC # BLD AUTO: 2.8 10*6/MM3 (ref 3.77–5.28)
SAO2 % BLDA: 99 % (ref 95–98)
SODIUM BLD-SCNC: 131 MMOL/L (ref 136–145)
SODIUM UR-SCNC: 29 MMOL/L
WBC NRBC COR # BLD: 8.71 10*3/MM3 (ref 3.4–10.8)

## 2019-03-19 PROCEDURE — 97110 THERAPEUTIC EXERCISES: CPT

## 2019-03-19 PROCEDURE — 85025 COMPLETE CBC W/AUTO DIFF WBC: CPT | Performed by: INTERNAL MEDICINE

## 2019-03-19 PROCEDURE — 80048 BASIC METABOLIC PNL TOTAL CA: CPT | Performed by: INTERNAL MEDICINE

## 2019-03-19 PROCEDURE — 83935 ASSAY OF URINE OSMOLALITY: CPT | Performed by: INTERNAL MEDICINE

## 2019-03-19 PROCEDURE — 85018 HEMOGLOBIN: CPT | Performed by: INTERNAL MEDICINE

## 2019-03-19 PROCEDURE — 84300 ASSAY OF URINE SODIUM: CPT | Performed by: INTERNAL MEDICINE

## 2019-03-19 PROCEDURE — 85014 HEMATOCRIT: CPT | Performed by: INTERNAL MEDICINE

## 2019-03-19 PROCEDURE — 83930 ASSAY OF BLOOD OSMOLALITY: CPT | Performed by: INTERNAL MEDICINE

## 2019-03-19 RX ADMIN — ASPIRIN 325 MG: 325 TABLET, DELAYED RELEASE ORAL at 08:36

## 2019-03-19 RX ADMIN — FAMOTIDINE 20 MG: 20 TABLET, FILM COATED ORAL at 20:16

## 2019-03-19 RX ADMIN — DOCUSATE SODIUM 100 MG: 100 CAPSULE, LIQUID FILLED ORAL at 20:16

## 2019-03-19 RX ADMIN — DOCUSATE SODIUM 100 MG: 100 CAPSULE, LIQUID FILLED ORAL at 08:36

## 2019-03-19 RX ADMIN — SODIUM CHLORIDE, PRESERVATIVE FREE 3 ML: 5 INJECTION INTRAVENOUS at 20:17

## 2019-03-19 RX ADMIN — FAMOTIDINE 20 MG: 20 TABLET, FILM COATED ORAL at 08:36

## 2019-03-19 RX ADMIN — POLYETHYLENE GLYCOL 3350 17 G: 17 POWDER, FOR SOLUTION ORAL at 08:36

## 2019-03-19 RX ADMIN — HYDROCODONE BITARTRATE AND ACETAMINOPHEN 1 TABLET: 5; 325 TABLET ORAL at 12:06

## 2019-03-19 RX ADMIN — HYDROCODONE BITARTRATE AND ACETAMINOPHEN 2 TABLET: 5; 325 TABLET ORAL at 17:44

## 2019-03-19 RX ADMIN — HYDROCODONE BITARTRATE AND ACETAMINOPHEN 2 TABLET: 5; 325 TABLET ORAL at 05:40

## 2019-03-19 RX ADMIN — HYDROCODONE BITARTRATE AND ACETAMINOPHEN 2 TABLET: 5; 325 TABLET ORAL at 23:42

## 2019-03-19 RX ADMIN — SODIUM CHLORIDE, PRESERVATIVE FREE 3 ML: 5 INJECTION INTRAVENOUS at 08:36

## 2019-03-20 VITALS
OXYGEN SATURATION: 96 % | SYSTOLIC BLOOD PRESSURE: 109 MMHG | BODY MASS INDEX: 23.57 KG/M2 | HEART RATE: 71 BPM | WEIGHT: 150.2 LBS | TEMPERATURE: 97.9 F | RESPIRATION RATE: 16 BRPM | HEIGHT: 67 IN | DIASTOLIC BLOOD PRESSURE: 65 MMHG

## 2019-03-20 PROBLEM — D72.829 LEUKOCYTOSIS: Status: RESOLVED | Noted: 2019-03-14 | Resolved: 2019-03-20

## 2019-03-20 PROBLEM — N17.9 AKI (ACUTE KIDNEY INJURY): Status: RESOLVED | Noted: 2019-03-15 | Resolved: 2019-03-20

## 2019-03-20 LAB
ANION GAP SERPL CALCULATED.3IONS-SCNC: 12.8 MMOL/L
BASOPHILS # BLD AUTO: 0.03 10*3/MM3 (ref 0–0.2)
BASOPHILS NFR BLD AUTO: 0.4 % (ref 0–1.5)
BUN BLD-MCNC: 20 MG/DL (ref 8–23)
BUN/CREAT SERPL: 29.9 (ref 7–25)
CALCIUM SPEC-SCNC: 8.6 MG/DL (ref 8.6–10.5)
CHLORIDE SERPL-SCNC: 98 MMOL/L (ref 98–107)
CO2 SERPL-SCNC: 26.2 MMOL/L (ref 22–29)
CREAT BLD-MCNC: 0.67 MG/DL (ref 0.57–1)
DEPRECATED RDW RBC AUTO: 47.6 FL (ref 37–54)
EOSINOPHIL # BLD AUTO: 0.31 10*3/MM3 (ref 0–0.4)
EOSINOPHIL NFR BLD AUTO: 4.2 % (ref 0.3–6.2)
ERYTHROCYTE [DISTWIDTH] IN BLOOD BY AUTOMATED COUNT: 13.4 % (ref 12.3–15.4)
GFR SERPL CREATININE-BSD FRML MDRD: 84 ML/MIN/1.73
GLUCOSE BLD-MCNC: 104 MG/DL (ref 65–99)
HCT VFR BLD AUTO: 27.6 % (ref 34–46.6)
HCT VFR BLD AUTO: 27.6 % (ref 34–46.6)
HCT VFR BLD AUTO: 27.7 % (ref 34–46.6)
HGB BLD-MCNC: 8.7 G/DL (ref 12–15.9)
HGB BLD-MCNC: 8.7 G/DL (ref 12–15.9)
HGB BLD-MCNC: 8.9 G/DL (ref 12–15.9)
IMM GRANULOCYTES # BLD AUTO: 0.04 10*3/MM3 (ref 0–0.05)
IMM GRANULOCYTES NFR BLD AUTO: 0.5 % (ref 0–0.5)
LYMPHOCYTES # BLD AUTO: 1.44 10*3/MM3 (ref 0.7–3.1)
LYMPHOCYTES NFR BLD AUTO: 19.4 % (ref 19.6–45.3)
MCH RBC QN AUTO: 30.5 PG (ref 26.6–33)
MCHC RBC AUTO-ENTMCNC: 31.5 G/DL (ref 31.5–35.7)
MCV RBC AUTO: 96.8 FL (ref 79–97)
MONOCYTES # BLD AUTO: 0.68 10*3/MM3 (ref 0.1–0.9)
MONOCYTES NFR BLD AUTO: 9.2 % (ref 5–12)
NEUTROPHILS # BLD AUTO: 4.92 10*3/MM3 (ref 1.4–7)
NEUTROPHILS NFR BLD AUTO: 66.3 % (ref 42.7–76)
NRBC BLD AUTO-RTO: 0.1 /100 WBC (ref 0–0)
PLATELET # BLD AUTO: 223 10*3/MM3 (ref 140–450)
PMV BLD AUTO: 9.6 FL (ref 6–12)
POTASSIUM BLD-SCNC: 4.1 MMOL/L (ref 3.5–5.2)
RBC # BLD AUTO: 2.85 10*6/MM3 (ref 3.77–5.28)
SODIUM BLD-SCNC: 137 MMOL/L (ref 136–145)
WBC NRBC COR # BLD: 7.42 10*3/MM3 (ref 3.4–10.8)

## 2019-03-20 PROCEDURE — 85018 HEMOGLOBIN: CPT | Performed by: INTERNAL MEDICINE

## 2019-03-20 PROCEDURE — 85014 HEMATOCRIT: CPT | Performed by: INTERNAL MEDICINE

## 2019-03-20 PROCEDURE — 80048 BASIC METABOLIC PNL TOTAL CA: CPT | Performed by: INTERNAL MEDICINE

## 2019-03-20 PROCEDURE — 85025 COMPLETE CBC W/AUTO DIFF WBC: CPT | Performed by: INTERNAL MEDICINE

## 2019-03-20 RX ORDER — ASPIRIN 81 MG/1
81 TABLET, CHEWABLE ORAL DAILY
Start: 2019-04-11 | End: 2019-12-11

## 2019-03-20 RX ORDER — HYDROCODONE BITARTRATE AND ACETAMINOPHEN 5; 325 MG/1; MG/1
1-2 TABLET ORAL EVERY 6 HOURS PRN
Qty: 24 TABLET | Refills: 0 | Status: SHIPPED | OUTPATIENT
Start: 2019-03-20 | End: 2019-03-24

## 2019-03-20 RX ORDER — DOXYCYCLINE HYCLATE 50 MG/1
324 CAPSULE, GELATIN COATED ORAL
Start: 2019-03-20 | End: 2019-04-19

## 2019-03-20 RX ORDER — FAMOTIDINE 20 MG/1
20 TABLET, FILM COATED ORAL 2 TIMES DAILY
Start: 2019-03-20 | End: 2021-10-19

## 2019-03-20 RX ORDER — ONDANSETRON 4 MG/1
4 TABLET, ORALLY DISINTEGRATING ORAL EVERY 6 HOURS PRN
Start: 2019-03-20 | End: 2021-04-21

## 2019-03-20 RX ADMIN — HYDROCODONE BITARTRATE AND ACETAMINOPHEN 1 TABLET: 5; 325 TABLET ORAL at 12:40

## 2019-03-20 RX ADMIN — POLYETHYLENE GLYCOL 3350 17 G: 17 POWDER, FOR SOLUTION ORAL at 08:57

## 2019-03-20 RX ADMIN — HYDROCODONE BITARTRATE AND ACETAMINOPHEN 2 TABLET: 5; 325 TABLET ORAL at 04:40

## 2019-03-20 RX ADMIN — HYDROCODONE BITARTRATE AND ACETAMINOPHEN 2 TABLET: 5; 325 TABLET ORAL at 08:57

## 2019-03-20 RX ADMIN — SODIUM CHLORIDE, PRESERVATIVE FREE 3 ML: 5 INJECTION INTRAVENOUS at 08:57

## 2019-03-20 RX ADMIN — ASPIRIN 325 MG: 325 TABLET, DELAYED RELEASE ORAL at 08:58

## 2019-03-20 RX ADMIN — DOCUSATE SODIUM 100 MG: 100 CAPSULE, LIQUID FILLED ORAL at 08:57

## 2019-03-20 RX ADMIN — PROPRANOLOL HYDROCHLORIDE 40 MG: 40 TABLET ORAL at 08:58

## 2019-03-20 RX ADMIN — FAMOTIDINE 20 MG: 20 TABLET, FILM COATED ORAL at 08:58

## 2019-03-27 ENCOUNTER — TELEPHONE (OUTPATIENT)
Dept: ORTHOPEDIC SURGERY | Facility: CLINIC | Age: 84
End: 2019-03-27

## 2019-04-03 ENCOUNTER — OFFICE VISIT (OUTPATIENT)
Dept: ORTHOPEDIC SURGERY | Facility: CLINIC | Age: 84
End: 2019-04-03

## 2019-04-03 DIAGNOSIS — Z09 FRACTURE FOLLOW-UP: Primary | ICD-10-CM

## 2019-04-03 PROCEDURE — 99024 POSTOP FOLLOW-UP VISIT: CPT | Performed by: ORTHOPAEDIC SURGERY

## 2019-04-03 PROCEDURE — 73502 X-RAY EXAM HIP UNI 2-3 VIEWS: CPT | Performed by: ORTHOPAEDIC SURGERY

## 2019-04-03 PROCEDURE — 73560 X-RAY EXAM OF KNEE 1 OR 2: CPT | Performed by: ORTHOPAEDIC SURGERY

## 2019-04-03 PROCEDURE — 73030 X-RAY EXAM OF SHOULDER: CPT | Performed by: ORTHOPAEDIC SURGERY

## 2019-04-04 ENCOUNTER — TELEPHONE (OUTPATIENT)
Dept: ORTHOPEDIC SURGERY | Facility: CLINIC | Age: 84
End: 2019-04-04

## 2019-04-08 ENCOUNTER — TELEPHONE (OUTPATIENT)
Dept: ORTHOPEDIC SURGERY | Facility: CLINIC | Age: 84
End: 2019-04-08

## 2019-05-01 ENCOUNTER — OFFICE VISIT (OUTPATIENT)
Dept: ORTHOPEDIC SURGERY | Facility: CLINIC | Age: 84
End: 2019-05-01

## 2019-05-01 VITALS — HEIGHT: 67 IN | BODY MASS INDEX: 23.7 KG/M2 | WEIGHT: 151 LBS | TEMPERATURE: 99.7 F

## 2019-05-01 DIAGNOSIS — Z09 SURGERY FOLLOW-UP: Primary | ICD-10-CM

## 2019-05-01 PROCEDURE — 73030 X-RAY EXAM OF SHOULDER: CPT | Performed by: ORTHOPAEDIC SURGERY

## 2019-05-01 PROCEDURE — 99024 POSTOP FOLLOW-UP VISIT: CPT | Performed by: ORTHOPAEDIC SURGERY

## 2019-05-01 PROCEDURE — 73552 X-RAY EXAM OF FEMUR 2/>: CPT | Performed by: ORTHOPAEDIC SURGERY

## 2019-05-01 RX ORDER — ACETAMINOPHEN 325 MG/1
650 TABLET ORAL EVERY 6 HOURS PRN
COMMUNITY
End: 2021-10-19

## 2019-05-06 ENCOUNTER — TELEPHONE (OUTPATIENT)
Dept: ORTHOPEDIC SURGERY | Facility: CLINIC | Age: 84
End: 2019-05-06

## 2019-05-14 ENCOUNTER — TELEPHONE (OUTPATIENT)
Dept: ORTHOPEDIC SURGERY | Facility: CLINIC | Age: 84
End: 2019-05-14

## 2019-05-14 DIAGNOSIS — Z96.649 PERIPROSTHETIC FRACTURE OF SHAFT OF FEMUR: ICD-10-CM

## 2019-05-14 DIAGNOSIS — M97.8XXA PERIPROSTHETIC FRACTURE OF SHAFT OF FEMUR: ICD-10-CM

## 2019-05-14 DIAGNOSIS — S42.202D CLOSED FRACTURE OF PROXIMAL END OF LEFT HUMERUS WITH ROUTINE HEALING, UNSPECIFIED FRACTURE MORPHOLOGY, SUBSEQUENT ENCOUNTER: Primary | ICD-10-CM

## 2019-05-16 ENCOUNTER — TELEPHONE (OUTPATIENT)
Dept: ORTHOPEDIC SURGERY | Facility: CLINIC | Age: 84
End: 2019-05-16

## 2019-05-20 ENCOUNTER — TELEPHONE (OUTPATIENT)
Dept: ORTHOPEDIC SURGERY | Facility: CLINIC | Age: 84
End: 2019-05-20

## 2019-06-12 ENCOUNTER — OFFICE VISIT (OUTPATIENT)
Dept: ORTHOPEDIC SURGERY | Facility: CLINIC | Age: 84
End: 2019-06-12

## 2019-06-12 VITALS — BODY MASS INDEX: 23.54 KG/M2 | TEMPERATURE: 98 F | WEIGHT: 150 LBS | HEIGHT: 67 IN

## 2019-06-12 DIAGNOSIS — Z09 SURGERY FOLLOW-UP: Primary | ICD-10-CM

## 2019-06-12 PROCEDURE — 73030 X-RAY EXAM OF SHOULDER: CPT | Performed by: ORTHOPAEDIC SURGERY

## 2019-06-12 PROCEDURE — 73552 X-RAY EXAM OF FEMUR 2/>: CPT | Performed by: ORTHOPAEDIC SURGERY

## 2019-06-12 PROCEDURE — 99024 POSTOP FOLLOW-UP VISIT: CPT | Performed by: ORTHOPAEDIC SURGERY

## 2019-07-24 ENCOUNTER — OFFICE VISIT (OUTPATIENT)
Dept: ORTHOPEDIC SURGERY | Facility: CLINIC | Age: 84
End: 2019-07-24

## 2019-07-24 VITALS — BODY MASS INDEX: 23.54 KG/M2 | HEIGHT: 67 IN | WEIGHT: 150 LBS | TEMPERATURE: 98.2 F

## 2019-07-24 DIAGNOSIS — S42.202D CLOSED FRACTURE OF PROXIMAL END OF LEFT HUMERUS WITH ROUTINE HEALING, UNSPECIFIED FRACTURE MORPHOLOGY, SUBSEQUENT ENCOUNTER: ICD-10-CM

## 2019-07-24 DIAGNOSIS — M97.8XXA PERIPROSTHETIC FRACTURE OF FEMUR AT TIP OF PROSTHESIS, INITIAL ENCOUNTER: Primary | ICD-10-CM

## 2019-07-24 DIAGNOSIS — Z96.649 PERIPROSTHETIC FRACTURE OF FEMUR AT TIP OF PROSTHESIS, INITIAL ENCOUNTER: Primary | ICD-10-CM

## 2019-07-24 PROCEDURE — 99212 OFFICE O/P EST SF 10 MIN: CPT | Performed by: ORTHOPAEDIC SURGERY

## 2019-07-24 PROCEDURE — 73552 X-RAY EXAM OF FEMUR 2/>: CPT | Performed by: ORTHOPAEDIC SURGERY

## 2019-08-02 ENCOUNTER — TELEPHONE (OUTPATIENT)
Dept: ORTHOPEDIC SURGERY | Facility: CLINIC | Age: 84
End: 2019-08-02

## 2019-09-09 ENCOUNTER — OFFICE VISIT (OUTPATIENT)
Dept: ORTHOPEDIC SURGERY | Facility: CLINIC | Age: 84
End: 2019-09-09

## 2019-09-09 VITALS — BODY MASS INDEX: 23.54 KG/M2 | TEMPERATURE: 97.9 F | WEIGHT: 150 LBS | HEIGHT: 67 IN

## 2019-09-09 DIAGNOSIS — Z96.649 PERIPROSTHETIC FRACTURE OF FEMUR AT TIP OF PROSTHESIS, INITIAL ENCOUNTER: Primary | ICD-10-CM

## 2019-09-09 DIAGNOSIS — M97.8XXA PERIPROSTHETIC FRACTURE OF FEMUR AT TIP OF PROSTHESIS, INITIAL ENCOUNTER: Primary | ICD-10-CM

## 2019-09-09 PROCEDURE — 99212 OFFICE O/P EST SF 10 MIN: CPT | Performed by: ORTHOPAEDIC SURGERY

## 2019-09-09 PROCEDURE — 73552 X-RAY EXAM OF FEMUR 2/>: CPT | Performed by: ORTHOPAEDIC SURGERY

## 2019-09-10 ENCOUNTER — TELEPHONE (OUTPATIENT)
Dept: ORTHOPEDIC SURGERY | Facility: CLINIC | Age: 84
End: 2019-09-10

## 2019-09-10 DIAGNOSIS — M97.8XXA PERIPROSTHETIC FRACTURE OF SHAFT OF FEMUR: Primary | ICD-10-CM

## 2019-09-10 DIAGNOSIS — Z96.649 PERIPROSTHETIC FRACTURE OF SHAFT OF FEMUR: Primary | ICD-10-CM

## 2019-09-12 ENCOUNTER — TELEPHONE (OUTPATIENT)
Dept: ORTHOPEDIC SURGERY | Facility: CLINIC | Age: 84
End: 2019-09-12

## 2019-09-13 ENCOUNTER — TELEPHONE (OUTPATIENT)
Dept: ORTHOPEDIC SURGERY | Facility: CLINIC | Age: 84
End: 2019-09-13

## 2019-09-13 DIAGNOSIS — M97.8XXA PERIPROSTHETIC FRACTURE OF SHAFT OF FEMUR: ICD-10-CM

## 2019-09-13 DIAGNOSIS — S72.362K: Primary | ICD-10-CM

## 2019-09-13 DIAGNOSIS — Z96.649 PERIPROSTHETIC FRACTURE OF SHAFT OF FEMUR: ICD-10-CM

## 2019-09-16 ENCOUNTER — TELEPHONE (OUTPATIENT)
Dept: ORTHOPEDIC SURGERY | Facility: CLINIC | Age: 84
End: 2019-09-16

## 2019-09-17 ENCOUNTER — TELEPHONE (OUTPATIENT)
Dept: ORTHOPEDIC SURGERY | Facility: CLINIC | Age: 84
End: 2019-09-17

## 2019-09-26 RX ORDER — PROPRANOLOL HYDROCHLORIDE 20 MG/1
TABLET ORAL
Qty: 270 TABLET | Refills: 1 | OUTPATIENT
Start: 2019-09-26

## 2019-09-26 RX ORDER — LISINOPRIL 40 MG/1
TABLET ORAL
Qty: 90 TABLET | Refills: 1 | OUTPATIENT
Start: 2019-09-26

## 2019-10-02 ENCOUNTER — TELEPHONE (OUTPATIENT)
Dept: ORTHOPEDIC SURGERY | Facility: CLINIC | Age: 84
End: 2019-10-02

## 2019-10-02 DIAGNOSIS — Z96.649 PERIPROSTHETIC FRACTURE OF FEMUR AT TIP OF PROSTHESIS, INITIAL ENCOUNTER: Primary | ICD-10-CM

## 2019-10-02 DIAGNOSIS — M97.8XXA PERIPROSTHETIC FRACTURE OF FEMUR AT TIP OF PROSTHESIS, INITIAL ENCOUNTER: Primary | ICD-10-CM

## 2019-10-30 RX ORDER — LISINOPRIL 40 MG/1
TABLET ORAL
Qty: 90 TABLET | Refills: 1 | Status: SHIPPED | OUTPATIENT
Start: 2019-10-30 | End: 2019-12-11 | Stop reason: SDUPTHER

## 2019-11-27 RX ORDER — AMLODIPINE BESYLATE 10 MG/1
TABLET ORAL
Qty: 90 TABLET | Refills: 0 | OUTPATIENT
Start: 2019-11-27

## 2019-12-03 RX ORDER — AMLODIPINE BESYLATE 10 MG/1
TABLET ORAL
Qty: 90 TABLET | Refills: 0 | OUTPATIENT
Start: 2019-12-03

## 2019-12-03 RX ORDER — PROPRANOLOL HYDROCHLORIDE 20 MG/1
TABLET ORAL
Qty: 270 TABLET | Refills: 0 | OUTPATIENT
Start: 2019-12-03

## 2019-12-09 ENCOUNTER — OFFICE VISIT (OUTPATIENT)
Dept: ORTHOPEDIC SURGERY | Facility: CLINIC | Age: 84
End: 2019-12-09

## 2019-12-09 VITALS — WEIGHT: 153 LBS | HEIGHT: 67 IN | BODY MASS INDEX: 24.01 KG/M2 | TEMPERATURE: 98.3 F

## 2019-12-09 DIAGNOSIS — M97.8XXA PERIPROSTHETIC FRACTURE OF FEMUR AT TIP OF PROSTHESIS, INITIAL ENCOUNTER: Primary | ICD-10-CM

## 2019-12-09 DIAGNOSIS — Z96.649 PERIPROSTHETIC FRACTURE OF FEMUR AT TIP OF PROSTHESIS, INITIAL ENCOUNTER: Primary | ICD-10-CM

## 2019-12-09 PROCEDURE — 99212 OFFICE O/P EST SF 10 MIN: CPT | Performed by: ORTHOPAEDIC SURGERY

## 2019-12-09 PROCEDURE — 73552 X-RAY EXAM OF FEMUR 2/>: CPT | Performed by: ORTHOPAEDIC SURGERY

## 2019-12-10 ENCOUNTER — TELEPHONE (OUTPATIENT)
Dept: ORTHOPEDIC SURGERY | Facility: CLINIC | Age: 84
End: 2019-12-10

## 2019-12-11 ENCOUNTER — OFFICE VISIT (OUTPATIENT)
Dept: FAMILY MEDICINE CLINIC | Facility: CLINIC | Age: 84
End: 2019-12-11

## 2019-12-11 VITALS
OXYGEN SATURATION: 98 % | SYSTOLIC BLOOD PRESSURE: 116 MMHG | BODY MASS INDEX: 24.8 KG/M2 | HEART RATE: 61 BPM | WEIGHT: 158 LBS | DIASTOLIC BLOOD PRESSURE: 72 MMHG | HEIGHT: 67 IN | TEMPERATURE: 98 F

## 2019-12-11 DIAGNOSIS — Z00.00 MEDICARE ANNUAL WELLNESS VISIT, SUBSEQUENT: ICD-10-CM

## 2019-12-11 DIAGNOSIS — I10 BENIGN ESSENTIAL HYPERTENSION: Primary | ICD-10-CM

## 2019-12-11 DIAGNOSIS — M81.0 AGE-RELATED OSTEOPOROSIS WITHOUT CURRENT PATHOLOGICAL FRACTURE: ICD-10-CM

## 2019-12-11 PROCEDURE — G0439 PPPS, SUBSEQ VISIT: HCPCS | Performed by: FAMILY MEDICINE

## 2019-12-11 PROCEDURE — 96160 PT-FOCUSED HLTH RISK ASSMT: CPT | Performed by: FAMILY MEDICINE

## 2019-12-11 RX ORDER — CETIRIZINE HYDROCHLORIDE 10 MG/1
10 TABLET ORAL DAILY
COMMUNITY
End: 2021-04-21

## 2019-12-11 RX ORDER — AMLODIPINE BESYLATE 10 MG/1
10 TABLET ORAL DAILY
Qty: 90 TABLET | Refills: 3 | Status: SHIPPED | OUTPATIENT
Start: 2019-12-11 | End: 2020-04-17 | Stop reason: SDUPTHER

## 2019-12-11 RX ORDER — LISINOPRIL 40 MG/1
40 TABLET ORAL DAILY
Qty: 90 TABLET | Refills: 3 | Status: SHIPPED | OUTPATIENT
Start: 2019-12-11 | End: 2020-04-17 | Stop reason: SDUPTHER

## 2019-12-11 RX ORDER — PROPRANOLOL HYDROCHLORIDE 40 MG/1
40 TABLET ORAL 3 TIMES DAILY
Qty: 270 TABLET | Refills: 3 | Status: SHIPPED | OUTPATIENT
Start: 2019-12-11 | End: 2020-04-17 | Stop reason: SDUPTHER

## 2019-12-11 RX ORDER — MULTIPLE VITAMINS W/ MINERALS TAB 9MG-400MCG
1 TAB ORAL DAILY
COMMUNITY

## 2019-12-11 RX ORDER — AMLODIPINE BESYLATE 10 MG/1
10 TABLET ORAL DAILY
COMMUNITY
End: 2019-12-11 | Stop reason: SDUPTHER

## 2019-12-12 LAB
ALBUMIN SERPL-MCNC: 4.4 G/DL (ref 3.5–5.2)
ALBUMIN/GLOB SERPL: 1.7 G/DL
ALP SERPL-CCNC: 54 U/L (ref 39–117)
ALT SERPL-CCNC: 12 U/L (ref 1–33)
AST SERPL-CCNC: 15 U/L (ref 1–32)
BILIRUB SERPL-MCNC: 0.3 MG/DL (ref 0.2–1.2)
BUN SERPL-MCNC: 21 MG/DL (ref 8–23)
BUN/CREAT SERPL: 26.3 (ref 7–25)
CALCIUM SERPL-MCNC: 9.9 MG/DL (ref 8.6–10.5)
CHLORIDE SERPL-SCNC: 98 MMOL/L (ref 98–107)
CHOLEST SERPL-MCNC: 211 MG/DL (ref 0–200)
CO2 SERPL-SCNC: 30.5 MMOL/L (ref 22–29)
CREAT SERPL-MCNC: 0.8 MG/DL (ref 0.57–1)
GLOBULIN SER CALC-MCNC: 2.6 GM/DL
GLUCOSE SERPL-MCNC: 81 MG/DL (ref 65–99)
HDLC SERPL-MCNC: 65 MG/DL (ref 40–60)
LDLC SERPL CALC-MCNC: 128 MG/DL (ref 0–100)
POTASSIUM SERPL-SCNC: 4.5 MMOL/L (ref 3.5–5.2)
PROT SERPL-MCNC: 7 G/DL (ref 6–8.5)
SODIUM SERPL-SCNC: 138 MMOL/L (ref 136–145)
TRIGL SERPL-MCNC: 92 MG/DL (ref 0–150)
VLDLC SERPL CALC-MCNC: 18.4 MG/DL

## 2020-01-08 ENCOUNTER — TELEPHONE (OUTPATIENT)
Dept: ORTHOPEDIC SURGERY | Facility: CLINIC | Age: 85
End: 2020-01-08

## 2020-03-09 ENCOUNTER — OFFICE VISIT (OUTPATIENT)
Dept: ORTHOPEDIC SURGERY | Facility: CLINIC | Age: 85
End: 2020-03-09

## 2020-03-09 VITALS — TEMPERATURE: 98.4 F | BODY MASS INDEX: 24.33 KG/M2 | HEIGHT: 67 IN | WEIGHT: 155 LBS

## 2020-03-09 DIAGNOSIS — M97.8XXA PERIPROSTHETIC FRACTURE OF FEMUR AT TIP OF PROSTHESIS, INITIAL ENCOUNTER: Primary | ICD-10-CM

## 2020-03-09 DIAGNOSIS — Z96.649 PERIPROSTHETIC FRACTURE OF FEMUR AT TIP OF PROSTHESIS, INITIAL ENCOUNTER: Primary | ICD-10-CM

## 2020-03-09 PROCEDURE — 99212 OFFICE O/P EST SF 10 MIN: CPT | Performed by: ORTHOPAEDIC SURGERY

## 2020-03-09 PROCEDURE — 73552 X-RAY EXAM OF FEMUR 2/>: CPT | Performed by: ORTHOPAEDIC SURGERY

## 2020-04-17 DIAGNOSIS — I10 BENIGN ESSENTIAL HYPERTENSION: ICD-10-CM

## 2020-04-17 RX ORDER — LISINOPRIL 40 MG/1
40 TABLET ORAL DAILY
Qty: 90 TABLET | Refills: 0 | Status: SHIPPED | OUTPATIENT
Start: 2020-04-17 | End: 2020-10-20

## 2020-04-17 RX ORDER — AMLODIPINE BESYLATE 10 MG/1
10 TABLET ORAL DAILY
Qty: 90 TABLET | Refills: 0 | Status: SHIPPED | OUTPATIENT
Start: 2020-04-17 | End: 2020-07-28

## 2020-04-17 RX ORDER — PROPRANOLOL HYDROCHLORIDE 40 MG/1
40 TABLET ORAL 3 TIMES DAILY
Qty: 270 TABLET | Refills: 0 | Status: SHIPPED | OUTPATIENT
Start: 2020-04-17 | End: 2020-10-20

## 2020-06-16 ENCOUNTER — OFFICE VISIT (OUTPATIENT)
Dept: FAMILY MEDICINE CLINIC | Facility: CLINIC | Age: 85
End: 2020-06-16

## 2020-06-16 VITALS
BODY MASS INDEX: 24.96 KG/M2 | HEART RATE: 54 BPM | DIASTOLIC BLOOD PRESSURE: 72 MMHG | OXYGEN SATURATION: 99 % | TEMPERATURE: 98.2 F | HEIGHT: 67 IN | WEIGHT: 159 LBS | SYSTOLIC BLOOD PRESSURE: 146 MMHG

## 2020-06-16 DIAGNOSIS — I10 BENIGN ESSENTIAL HYPERTENSION: ICD-10-CM

## 2020-06-16 DIAGNOSIS — M97.8XXA PERIPROSTHETIC FRACTURE OF FEMUR AT TIP OF PROSTHESIS, INITIAL ENCOUNTER: Primary | ICD-10-CM

## 2020-06-16 DIAGNOSIS — Z96.649 PERIPROSTHETIC FRACTURE OF FEMUR AT TIP OF PROSTHESIS, INITIAL ENCOUNTER: Primary | ICD-10-CM

## 2020-06-16 DIAGNOSIS — M81.0 AGE-RELATED OSTEOPOROSIS WITHOUT CURRENT PATHOLOGICAL FRACTURE: ICD-10-CM

## 2020-06-16 PROCEDURE — 99214 OFFICE O/P EST MOD 30 MIN: CPT | Performed by: FAMILY MEDICINE

## 2020-06-16 RX ORDER — CARBAMAZEPINE 100 MG/1
TABLET, CHEWABLE ORAL AS NEEDED
COMMUNITY
End: 2020-12-07 | Stop reason: SDUPTHER

## 2020-06-16 RX ORDER — MECLIZINE HYDROCHLORIDE 25 MG/1
25 TABLET ORAL AS NEEDED
COMMUNITY
End: 2020-10-21 | Stop reason: SDUPTHER

## 2020-06-17 LAB
ALBUMIN SERPL-MCNC: 4.7 G/DL (ref 3.5–5.2)
ALBUMIN/GLOB SERPL: 1.7 G/DL
ALP SERPL-CCNC: 55 U/L (ref 39–117)
ALT SERPL-CCNC: 12 U/L (ref 1–33)
AST SERPL-CCNC: 16 U/L (ref 1–32)
BASOPHILS # BLD AUTO: 0.04 10*3/MM3 (ref 0–0.2)
BASOPHILS NFR BLD AUTO: 0.4 % (ref 0–1.5)
BILIRUB SERPL-MCNC: 0.3 MG/DL (ref 0.2–1.2)
BUN SERPL-MCNC: 15 MG/DL (ref 8–23)
BUN/CREAT SERPL: 14.9 (ref 7–25)
CALCIUM SERPL-MCNC: 10.3 MG/DL (ref 8.6–10.5)
CHLORIDE SERPL-SCNC: 99 MMOL/L (ref 98–107)
CO2 SERPL-SCNC: 27.8 MMOL/L (ref 22–29)
CREAT SERPL-MCNC: 1.01 MG/DL (ref 0.57–1)
EOSINOPHIL # BLD AUTO: 0.15 10*3/MM3 (ref 0–0.4)
EOSINOPHIL NFR BLD AUTO: 1.6 % (ref 0.3–6.2)
ERYTHROCYTE [DISTWIDTH] IN BLOOD BY AUTOMATED COUNT: 12.2 % (ref 12.3–15.4)
GLOBULIN SER CALC-MCNC: 2.7 GM/DL
GLUCOSE SERPL-MCNC: 112 MG/DL (ref 65–99)
HCT VFR BLD AUTO: 39.2 % (ref 34–46.6)
HGB BLD-MCNC: 13.1 G/DL (ref 12–15.9)
IMM GRANULOCYTES # BLD AUTO: 0.03 10*3/MM3 (ref 0–0.05)
IMM GRANULOCYTES NFR BLD AUTO: 0.3 % (ref 0–0.5)
LYMPHOCYTES # BLD AUTO: 2.34 10*3/MM3 (ref 0.7–3.1)
LYMPHOCYTES NFR BLD AUTO: 25.5 % (ref 19.6–45.3)
MCH RBC QN AUTO: 30.2 PG (ref 26.6–33)
MCHC RBC AUTO-ENTMCNC: 33.4 G/DL (ref 31.5–35.7)
MCV RBC AUTO: 90.3 FL (ref 79–97)
MONOCYTES # BLD AUTO: 0.51 10*3/MM3 (ref 0.1–0.9)
MONOCYTES NFR BLD AUTO: 5.6 % (ref 5–12)
NEUTROPHILS # BLD AUTO: 6.11 10*3/MM3 (ref 1.7–7)
NEUTROPHILS NFR BLD AUTO: 66.6 % (ref 42.7–76)
NRBC BLD AUTO-RTO: 0 /100 WBC (ref 0–0.2)
PLATELET # BLD AUTO: 269 10*3/MM3 (ref 140–450)
POTASSIUM SERPL-SCNC: 4.5 MMOL/L (ref 3.5–5.2)
PROT SERPL-MCNC: 7.4 G/DL (ref 6–8.5)
RBC # BLD AUTO: 4.34 10*6/MM3 (ref 3.77–5.28)
SODIUM SERPL-SCNC: 136 MMOL/L (ref 136–145)
WBC # BLD AUTO: 9.18 10*3/MM3 (ref 3.4–10.8)

## 2020-06-22 ENCOUNTER — TELEPHONE (OUTPATIENT)
Dept: FAMILY MEDICINE CLINIC | Facility: CLINIC | Age: 85
End: 2020-06-22

## 2020-07-28 DIAGNOSIS — I10 BENIGN ESSENTIAL HYPERTENSION: ICD-10-CM

## 2020-07-28 RX ORDER — AMLODIPINE BESYLATE 10 MG/1
TABLET ORAL
Qty: 90 TABLET | Refills: 0 | Status: SHIPPED | OUTPATIENT
Start: 2020-07-28 | End: 2020-10-09

## 2020-10-02 ENCOUNTER — OFFICE VISIT (OUTPATIENT)
Dept: ORTHOPEDIC SURGERY | Facility: CLINIC | Age: 85
End: 2020-10-02

## 2020-10-02 VITALS — TEMPERATURE: 97 F | WEIGHT: 155 LBS | HEIGHT: 67 IN | BODY MASS INDEX: 24.33 KG/M2

## 2020-10-02 DIAGNOSIS — Z09 SURGERY FOLLOW-UP: Primary | ICD-10-CM

## 2020-10-02 PROCEDURE — 73552 X-RAY EXAM OF FEMUR 2/>: CPT | Performed by: ORTHOPAEDIC SURGERY

## 2020-10-02 PROCEDURE — 99212 OFFICE O/P EST SF 10 MIN: CPT | Performed by: ORTHOPAEDIC SURGERY

## 2020-10-02 RX ORDER — OMEPRAZOLE 20 MG/1
20 CAPSULE, DELAYED RELEASE ORAL DAILY
COMMUNITY
End: 2021-04-21

## 2020-10-05 ENCOUNTER — TELEPHONE (OUTPATIENT)
Dept: ORTHOPEDICS | Facility: OTHER | Age: 85
End: 2020-10-05

## 2020-10-08 DIAGNOSIS — I10 BENIGN ESSENTIAL HYPERTENSION: ICD-10-CM

## 2020-10-09 RX ORDER — AMLODIPINE BESYLATE 10 MG/1
TABLET ORAL
Qty: 90 TABLET | Refills: 0 | Status: SHIPPED | OUTPATIENT
Start: 2020-10-09 | End: 2021-01-26

## 2020-10-19 DIAGNOSIS — I10 BENIGN ESSENTIAL HYPERTENSION: ICD-10-CM

## 2020-10-20 RX ORDER — LISINOPRIL 40 MG/1
TABLET ORAL
Qty: 90 TABLET | Refills: 0 | Status: SHIPPED | OUTPATIENT
Start: 2020-10-20 | End: 2021-01-26

## 2020-10-20 RX ORDER — PROPRANOLOL HYDROCHLORIDE 40 MG/1
TABLET ORAL
Qty: 270 TABLET | Refills: 0 | Status: SHIPPED | OUTPATIENT
Start: 2020-10-20 | End: 2021-04-19

## 2020-10-23 RX ORDER — MECLIZINE HYDROCHLORIDE 25 MG/1
25 TABLET ORAL EVERY 8 HOURS PRN
Qty: 45 TABLET | Refills: 0 | Status: SHIPPED | OUTPATIENT
Start: 2020-10-23 | End: 2021-10-19

## 2020-12-07 ENCOUNTER — TELEPHONE (OUTPATIENT)
Dept: FAMILY MEDICINE CLINIC | Facility: CLINIC | Age: 85
End: 2020-12-07

## 2020-12-07 ENCOUNTER — OFFICE VISIT (OUTPATIENT)
Dept: FAMILY MEDICINE CLINIC | Facility: CLINIC | Age: 85
End: 2020-12-07

## 2020-12-07 VITALS
SYSTOLIC BLOOD PRESSURE: 130 MMHG | HEART RATE: 55 BPM | WEIGHT: 154 LBS | DIASTOLIC BLOOD PRESSURE: 60 MMHG | TEMPERATURE: 97.3 F | OXYGEN SATURATION: 98 % | BODY MASS INDEX: 24.17 KG/M2 | HEIGHT: 67 IN

## 2020-12-07 DIAGNOSIS — M81.0 AGE-RELATED OSTEOPOROSIS WITHOUT CURRENT PATHOLOGICAL FRACTURE: ICD-10-CM

## 2020-12-07 DIAGNOSIS — I10 BENIGN ESSENTIAL HYPERTENSION: Primary | ICD-10-CM

## 2020-12-07 DIAGNOSIS — M54.16 LUMBAR RADICULOPATHY: ICD-10-CM

## 2020-12-07 DIAGNOSIS — G44.009 CLUSTER HEADACHE, NOT INTRACTABLE, UNSPECIFIED CHRONICITY PATTERN: ICD-10-CM

## 2020-12-07 PROCEDURE — 99214 OFFICE O/P EST MOD 30 MIN: CPT | Performed by: FAMILY MEDICINE

## 2020-12-07 RX ORDER — CARBAMAZEPINE 100 MG/1
100 TABLET, CHEWABLE ORAL AS NEEDED
Qty: 30 TABLET | Refills: 0 | Status: SHIPPED | OUTPATIENT
Start: 2020-12-07 | End: 2020-12-09 | Stop reason: SDUPTHER

## 2020-12-07 RX ORDER — MELOXICAM 15 MG/1
15 TABLET ORAL DAILY
Qty: 90 TABLET | Refills: 1 | Status: SHIPPED | OUTPATIENT
Start: 2020-12-07 | End: 2021-04-21 | Stop reason: SDUPTHER

## 2020-12-08 ENCOUNTER — TELEPHONE (OUTPATIENT)
Dept: FAMILY MEDICINE CLINIC | Facility: CLINIC | Age: 85
End: 2020-12-08

## 2020-12-08 LAB
ALBUMIN SERPL-MCNC: 4.6 G/DL (ref 3.5–5.2)
ALBUMIN/GLOB SERPL: 1.8 G/DL
ALP SERPL-CCNC: 48 U/L (ref 39–117)
ALT SERPL-CCNC: 13 U/L (ref 1–33)
AST SERPL-CCNC: 15 U/L (ref 1–32)
BASOPHILS # BLD AUTO: 0.04 10*3/MM3 (ref 0–0.2)
BASOPHILS NFR BLD AUTO: 0.4 % (ref 0–1.5)
BILIRUB SERPL-MCNC: 0.3 MG/DL (ref 0–1.2)
BUN SERPL-MCNC: 17 MG/DL (ref 8–23)
BUN/CREAT SERPL: 20.2 (ref 7–25)
CALCIUM SERPL-MCNC: 9.9 MG/DL (ref 8.6–10.5)
CHLORIDE SERPL-SCNC: 100 MMOL/L (ref 98–107)
CO2 SERPL-SCNC: 29.6 MMOL/L (ref 22–29)
CREAT SERPL-MCNC: 0.84 MG/DL (ref 0.57–1)
EOSINOPHIL # BLD AUTO: 0.07 10*3/MM3 (ref 0–0.4)
EOSINOPHIL NFR BLD AUTO: 0.7 % (ref 0.3–6.2)
ERYTHROCYTE [DISTWIDTH] IN BLOOD BY AUTOMATED COUNT: 12.5 % (ref 12.3–15.4)
GLOBULIN SER CALC-MCNC: 2.5 GM/DL
GLUCOSE SERPL-MCNC: 111 MG/DL (ref 65–99)
HCT VFR BLD AUTO: 38.2 % (ref 34–46.6)
HGB BLD-MCNC: 12.7 G/DL (ref 12–15.9)
IMM GRANULOCYTES # BLD AUTO: 0.04 10*3/MM3 (ref 0–0.05)
IMM GRANULOCYTES NFR BLD AUTO: 0.4 % (ref 0–0.5)
LYMPHOCYTES # BLD AUTO: 2.23 10*3/MM3 (ref 0.7–3.1)
LYMPHOCYTES NFR BLD AUTO: 21.2 % (ref 19.6–45.3)
MCH RBC QN AUTO: 31.4 PG (ref 26.6–33)
MCHC RBC AUTO-ENTMCNC: 33.2 G/DL (ref 31.5–35.7)
MCV RBC AUTO: 94.3 FL (ref 79–97)
MONOCYTES # BLD AUTO: 0.55 10*3/MM3 (ref 0.1–0.9)
MONOCYTES NFR BLD AUTO: 5.2 % (ref 5–12)
NEUTROPHILS # BLD AUTO: 7.6 10*3/MM3 (ref 1.7–7)
NEUTROPHILS NFR BLD AUTO: 72.1 % (ref 42.7–76)
NRBC BLD AUTO-RTO: 0 /100 WBC (ref 0–0.2)
PLATELET # BLD AUTO: 280 10*3/MM3 (ref 140–450)
POTASSIUM SERPL-SCNC: 4.9 MMOL/L (ref 3.5–5.2)
PROT SERPL-MCNC: 7.1 G/DL (ref 6–8.5)
RBC # BLD AUTO: 4.05 10*6/MM3 (ref 3.77–5.28)
SODIUM SERPL-SCNC: 137 MMOL/L (ref 136–145)
WBC # BLD AUTO: 10.53 10*3/MM3 (ref 3.4–10.8)

## 2020-12-09 DIAGNOSIS — G44.009 CLUSTER HEADACHE, NOT INTRACTABLE, UNSPECIFIED CHRONICITY PATTERN: ICD-10-CM

## 2020-12-09 RX ORDER — CARBAMAZEPINE 100 MG/1
100 TABLET, CHEWABLE ORAL AS NEEDED
Qty: 30 TABLET | Refills: 0 | Status: SHIPPED | OUTPATIENT
Start: 2020-12-09 | End: 2021-09-07

## 2021-01-05 ENCOUNTER — HOSPITAL ENCOUNTER (OUTPATIENT)
Dept: MRI IMAGING | Facility: HOSPITAL | Age: 86
Discharge: HOME | End: 2021-01-05
Admitting: FAMILY MEDICINE

## 2021-01-05 PROCEDURE — 72158 MRI LUMBAR SPINE W/O & W/DYE: CPT

## 2021-01-05 PROCEDURE — A9577 INJ MULTIHANCE: HCPCS | Performed by: FAMILY MEDICINE

## 2021-01-05 PROCEDURE — 0 GADOBENATE DIMEGLUMINE 529 MG/ML SOLUTION: Performed by: FAMILY MEDICINE

## 2021-01-05 PROCEDURE — 82565 ASSAY OF CREATININE: CPT

## 2021-01-05 RX ADMIN — GADOBENATE DIMEGLUMINE 15 ML: 529 INJECTION, SOLUTION INTRAVENOUS at 13:23

## 2021-01-06 LAB — CREAT BLDA-MCNC: 0.9 MG/DL (ref 0.6–1.3)

## 2021-01-08 ENCOUNTER — TELEPHONE (OUTPATIENT)
Dept: FAMILY MEDICINE CLINIC | Facility: CLINIC | Age: 86
End: 2021-01-08

## 2021-01-08 DIAGNOSIS — M48.061 SPINAL STENOSIS OF LUMBAR REGION, UNSPECIFIED WHETHER NEUROGENIC CLAUDICATION PRESENT: Primary | ICD-10-CM

## 2021-01-26 DIAGNOSIS — I10 BENIGN ESSENTIAL HYPERTENSION: ICD-10-CM

## 2021-01-26 RX ORDER — LISINOPRIL 40 MG/1
TABLET ORAL
Qty: 90 TABLET | Refills: 0 | Status: SHIPPED | OUTPATIENT
Start: 2021-01-26 | End: 2021-04-19

## 2021-01-26 RX ORDER — AMLODIPINE BESYLATE 10 MG/1
TABLET ORAL
Qty: 90 TABLET | Refills: 0 | Status: SHIPPED | OUTPATIENT
Start: 2021-01-26 | End: 2021-04-19

## 2021-01-29 ENCOUNTER — OFFICE VISIT (OUTPATIENT)
Dept: NEUROSURGERY | Facility: CLINIC | Age: 86
End: 2021-01-29

## 2021-01-29 VITALS
DIASTOLIC BLOOD PRESSURE: 60 MMHG | HEART RATE: 80 BPM | WEIGHT: 154 LBS | HEIGHT: 67 IN | SYSTOLIC BLOOD PRESSURE: 132 MMHG | BODY MASS INDEX: 24.17 KG/M2 | TEMPERATURE: 97.8 F

## 2021-01-29 DIAGNOSIS — M48.062 SPINAL STENOSIS, LUMBAR REGION, WITH NEUROGENIC CLAUDICATION: Primary | ICD-10-CM

## 2021-01-29 PROCEDURE — 99202 OFFICE O/P NEW SF 15 MIN: CPT | Performed by: NEUROLOGICAL SURGERY

## 2021-02-01 PROCEDURE — G0180 MD CERTIFICATION HHA PATIENT: HCPCS | Performed by: FAMILY MEDICINE

## 2021-04-19 DIAGNOSIS — I10 BENIGN ESSENTIAL HYPERTENSION: ICD-10-CM

## 2021-04-19 RX ORDER — LISINOPRIL 40 MG/1
40 TABLET ORAL DAILY
Qty: 90 TABLET | Refills: 3 | Status: SHIPPED | OUTPATIENT
Start: 2021-04-19 | End: 2021-04-21 | Stop reason: SDUPTHER

## 2021-04-19 RX ORDER — PROPRANOLOL HYDROCHLORIDE 40 MG/1
40 TABLET ORAL 3 TIMES DAILY
Qty: 270 TABLET | Refills: 3 | Status: SHIPPED | OUTPATIENT
Start: 2021-04-19 | End: 2021-04-21 | Stop reason: SDUPTHER

## 2021-04-19 RX ORDER — AMLODIPINE BESYLATE 10 MG/1
10 TABLET ORAL DAILY
Qty: 90 TABLET | Refills: 3 | Status: SHIPPED | OUTPATIENT
Start: 2021-04-19 | End: 2021-04-21 | Stop reason: SDUPTHER

## 2021-04-21 ENCOUNTER — OFFICE VISIT (OUTPATIENT)
Dept: FAMILY MEDICINE CLINIC | Facility: CLINIC | Age: 86
End: 2021-04-21

## 2021-04-21 VITALS
HEIGHT: 67 IN | BODY MASS INDEX: 24.64 KG/M2 | TEMPERATURE: 96.8 F | OXYGEN SATURATION: 98 % | HEART RATE: 51 BPM | DIASTOLIC BLOOD PRESSURE: 72 MMHG | WEIGHT: 157 LBS | SYSTOLIC BLOOD PRESSURE: 150 MMHG

## 2021-04-21 DIAGNOSIS — M54.16 LUMBAR RADICULOPATHY: ICD-10-CM

## 2021-04-21 DIAGNOSIS — G44.009 CLUSTER HEADACHE, NOT INTRACTABLE, UNSPECIFIED CHRONICITY PATTERN: ICD-10-CM

## 2021-04-21 DIAGNOSIS — I10 BENIGN ESSENTIAL HYPERTENSION: Primary | ICD-10-CM

## 2021-04-21 DIAGNOSIS — Z78.0 POST-MENOPAUSAL: ICD-10-CM

## 2021-04-21 DIAGNOSIS — M48.062 SPINAL STENOSIS, LUMBAR REGION, WITH NEUROGENIC CLAUDICATION: ICD-10-CM

## 2021-04-21 PROCEDURE — 99214 OFFICE O/P EST MOD 30 MIN: CPT | Performed by: FAMILY MEDICINE

## 2021-04-21 RX ORDER — LISINOPRIL 40 MG/1
40 TABLET ORAL DAILY
Qty: 90 TABLET | Refills: 3 | Status: SHIPPED | OUTPATIENT
Start: 2021-04-21 | End: 2022-01-19 | Stop reason: SDUPTHER

## 2021-04-21 RX ORDER — PROPRANOLOL HYDROCHLORIDE 40 MG/1
40 TABLET ORAL 2 TIMES DAILY
Qty: 180 TABLET | Refills: 3 | Status: SHIPPED | OUTPATIENT
Start: 2021-04-21 | End: 2021-08-04 | Stop reason: SDUPTHER

## 2021-04-21 RX ORDER — MELOXICAM 15 MG/1
15 TABLET ORAL DAILY
Qty: 90 TABLET | Refills: 1 | Status: SHIPPED | OUTPATIENT
Start: 2021-04-21 | End: 2022-01-10

## 2021-04-21 RX ORDER — AMLODIPINE BESYLATE 10 MG/1
10 TABLET ORAL DAILY
Qty: 90 TABLET | Refills: 3 | Status: SHIPPED | OUTPATIENT
Start: 2021-04-21 | End: 2022-01-19 | Stop reason: SDUPTHER

## 2021-06-21 ENCOUNTER — TELEPHONE (OUTPATIENT)
Dept: FAMILY MEDICINE CLINIC | Facility: CLINIC | Age: 86
End: 2021-06-21

## 2021-08-04 ENCOUNTER — TELEPHONE (OUTPATIENT)
Dept: FAMILY MEDICINE CLINIC | Facility: CLINIC | Age: 86
End: 2021-08-04

## 2021-08-04 DIAGNOSIS — I10 BENIGN ESSENTIAL HYPERTENSION: ICD-10-CM

## 2021-08-04 RX ORDER — PROPRANOLOL HYDROCHLORIDE 60 MG/1
60 TABLET ORAL 2 TIMES DAILY
Qty: 180 TABLET | Refills: 0 | Status: SHIPPED | OUTPATIENT
Start: 2021-08-04 | End: 2021-08-11 | Stop reason: SDUPTHER

## 2021-08-11 ENCOUNTER — OFFICE VISIT (OUTPATIENT)
Dept: FAMILY MEDICINE CLINIC | Facility: CLINIC | Age: 86
End: 2021-08-11

## 2021-08-11 VITALS
TEMPERATURE: 97.3 F | HEART RATE: 47 BPM | BODY MASS INDEX: 24.28 KG/M2 | WEIGHT: 155 LBS | DIASTOLIC BLOOD PRESSURE: 60 MMHG | SYSTOLIC BLOOD PRESSURE: 140 MMHG | OXYGEN SATURATION: 97 %

## 2021-08-11 DIAGNOSIS — I95.2 HYPOTENSION DUE TO DRUGS: Primary | ICD-10-CM

## 2021-08-11 DIAGNOSIS — I10 BENIGN ESSENTIAL HYPERTENSION: ICD-10-CM

## 2021-08-11 PROCEDURE — 99214 OFFICE O/P EST MOD 30 MIN: CPT | Performed by: FAMILY MEDICINE

## 2021-08-11 RX ORDER — PROPRANOLOL HYDROCHLORIDE 40 MG/1
40 TABLET ORAL 2 TIMES DAILY
Qty: 180 TABLET | Refills: 1
Start: 2021-08-11 | End: 2022-01-19 | Stop reason: SDUPTHER

## 2021-08-24 ENCOUNTER — HOSPITAL ENCOUNTER (OUTPATIENT)
Dept: BONE DENSITY | Facility: HOSPITAL | Age: 86
Discharge: HOME | End: 2021-08-24
Admitting: FAMILY MEDICINE

## 2021-08-24 DIAGNOSIS — Z78.0 POST-MENOPAUSAL: ICD-10-CM

## 2021-08-24 PROCEDURE — 77080 DXA BONE DENSITY AXIAL: CPT

## 2021-08-25 DIAGNOSIS — M81.0 AGE-RELATED OSTEOPOROSIS WITHOUT CURRENT PATHOLOGICAL FRACTURE: Primary | ICD-10-CM

## 2021-08-25 RX ORDER — DENOSUMAB 60 MG/ML
60 INJECTION SUBCUTANEOUS ONCE
Qty: 180 ML
Start: 2021-08-25 | End: 2022-07-27

## 2021-08-30 DIAGNOSIS — I10 BENIGN ESSENTIAL HYPERTENSION: Primary | ICD-10-CM

## 2021-08-30 DIAGNOSIS — M81.0 AGE-RELATED OSTEOPOROSIS WITHOUT CURRENT PATHOLOGICAL FRACTURE: ICD-10-CM

## 2021-09-01 ENCOUNTER — INFUSION (OUTPATIENT)
Dept: ONCOLOGY | Facility: HOSPITAL | Age: 86
End: 2021-09-01

## 2021-09-01 ENCOUNTER — LAB (OUTPATIENT)
Dept: OTHER | Facility: HOSPITAL | Age: 86
End: 2021-09-01

## 2021-09-01 VITALS — RESPIRATION RATE: 18 BRPM | BODY MASS INDEX: 24.55 KG/M2 | HEIGHT: 67 IN | WEIGHT: 156.4 LBS | TEMPERATURE: 96.8 F

## 2021-09-01 DIAGNOSIS — M81.0 AGE-RELATED OSTEOPOROSIS WITHOUT CURRENT PATHOLOGICAL FRACTURE: Primary | ICD-10-CM

## 2021-09-01 PROCEDURE — 25010000002 DENOSUMAB 60 MG/ML SOLUTION PREFILLED SYRINGE: Performed by: FAMILY MEDICINE

## 2021-09-01 PROCEDURE — 80053 COMPREHEN METABOLIC PANEL: CPT | Performed by: FAMILY MEDICINE

## 2021-09-01 PROCEDURE — 84100 ASSAY OF PHOSPHORUS: CPT | Performed by: FAMILY MEDICINE

## 2021-09-01 PROCEDURE — 96372 THER/PROPH/DIAG INJ SC/IM: CPT

## 2021-09-01 PROCEDURE — 83735 ASSAY OF MAGNESIUM: CPT | Performed by: FAMILY MEDICINE

## 2021-09-01 RX ADMIN — DENOSUMAB 60 MG: 60 INJECTION SUBCUTANEOUS at 14:16

## 2021-09-04 DIAGNOSIS — G44.009 CLUSTER HEADACHE, NOT INTRACTABLE, UNSPECIFIED CHRONICITY PATTERN: ICD-10-CM

## 2021-09-07 RX ORDER — CARBAMAZEPINE 100 MG/1
TABLET, CHEWABLE ORAL
Qty: 30 TABLET | Refills: 0 | Status: SHIPPED | OUTPATIENT
Start: 2021-09-07 | End: 2021-10-19

## 2021-09-08 ENCOUNTER — OFFICE VISIT (OUTPATIENT)
Dept: FAMILY MEDICINE CLINIC | Facility: CLINIC | Age: 86
End: 2021-09-08

## 2021-09-08 VITALS
TEMPERATURE: 98 F | SYSTOLIC BLOOD PRESSURE: 152 MMHG | OXYGEN SATURATION: 98 % | WEIGHT: 156.4 LBS | HEART RATE: 57 BPM | BODY MASS INDEX: 24.55 KG/M2 | DIASTOLIC BLOOD PRESSURE: 70 MMHG | HEIGHT: 67 IN

## 2021-09-08 DIAGNOSIS — M48.062 SPINAL STENOSIS, LUMBAR REGION, WITH NEUROGENIC CLAUDICATION: Primary | ICD-10-CM

## 2021-09-08 PROCEDURE — 99214 OFFICE O/P EST MOD 30 MIN: CPT | Performed by: FAMILY MEDICINE

## 2021-09-08 RX ORDER — HYDROCODONE BITARTRATE AND ACETAMINOPHEN 5; 325 MG/1; MG/1
1 TABLET ORAL EVERY 8 HOURS PRN
Qty: 90 TABLET | Refills: 0 | Status: SHIPPED | OUTPATIENT
Start: 2021-09-08 | End: 2021-10-19

## 2021-10-19 ENCOUNTER — OFFICE VISIT (OUTPATIENT)
Dept: PAIN MEDICINE | Facility: CLINIC | Age: 86
End: 2021-10-19

## 2021-10-19 ENCOUNTER — PREP FOR SURGERY (OUTPATIENT)
Dept: SURGERY | Facility: SURGERY CENTER | Age: 86
End: 2021-10-19

## 2021-10-19 VITALS
OXYGEN SATURATION: 98 % | RESPIRATION RATE: 18 BRPM | BODY MASS INDEX: 24.52 KG/M2 | DIASTOLIC BLOOD PRESSURE: 69 MMHG | TEMPERATURE: 96.2 F | SYSTOLIC BLOOD PRESSURE: 159 MMHG | HEIGHT: 67 IN | HEART RATE: 61 BPM | WEIGHT: 156.2 LBS

## 2021-10-19 DIAGNOSIS — M54.16 LUMBAR RADICULITIS: ICD-10-CM

## 2021-10-19 DIAGNOSIS — M48.062 LUMBAR STENOSIS WITH NEUROGENIC CLAUDICATION: Primary | ICD-10-CM

## 2021-10-19 PROCEDURE — 99214 OFFICE O/P EST MOD 30 MIN: CPT | Performed by: ANESTHESIOLOGY

## 2021-10-19 RX ORDER — ACETAMINOPHEN 500 MG
500 TABLET ORAL EVERY 6 HOURS PRN
COMMUNITY
End: 2022-07-27

## 2021-10-19 RX ORDER — SODIUM CHLORIDE 0.9 % (FLUSH) 0.9 %
10 SYRINGE (ML) INJECTION AS NEEDED
Status: CANCELLED | OUTPATIENT
Start: 2021-10-19

## 2021-10-19 RX ORDER — NICOTINE POLACRILEX 4 MG/1
20 GUM, CHEWING ORAL DAILY
COMMUNITY

## 2021-10-19 RX ORDER — SODIUM CHLORIDE 0.9 % (FLUSH) 0.9 %
10 SYRINGE (ML) INJECTION EVERY 12 HOURS SCHEDULED
Status: CANCELLED | OUTPATIENT
Start: 2021-10-19

## 2021-10-20 ENCOUNTER — TRANSCRIBE ORDERS (OUTPATIENT)
Dept: SURGERY | Facility: SURGERY CENTER | Age: 86
End: 2021-10-20

## 2021-10-20 ENCOUNTER — OFFICE VISIT (OUTPATIENT)
Dept: FAMILY MEDICINE CLINIC | Facility: CLINIC | Age: 86
End: 2021-10-20

## 2021-10-20 VITALS
DIASTOLIC BLOOD PRESSURE: 76 MMHG | WEIGHT: 156.8 LBS | HEART RATE: 54 BPM | OXYGEN SATURATION: 98 % | BODY MASS INDEX: 24.61 KG/M2 | TEMPERATURE: 98.2 F | SYSTOLIC BLOOD PRESSURE: 142 MMHG | HEIGHT: 67 IN

## 2021-10-20 DIAGNOSIS — M81.0 AGE-RELATED OSTEOPOROSIS WITHOUT CURRENT PATHOLOGICAL FRACTURE: ICD-10-CM

## 2021-10-20 DIAGNOSIS — M48.062 SPINAL STENOSIS, LUMBAR REGION, WITH NEUROGENIC CLAUDICATION: ICD-10-CM

## 2021-10-20 DIAGNOSIS — I10 BENIGN ESSENTIAL HYPERTENSION: Primary | ICD-10-CM

## 2021-10-20 DIAGNOSIS — M48.062 LUMBAR STENOSIS WITH NEUROGENIC CLAUDICATION: Primary | ICD-10-CM

## 2021-10-20 DIAGNOSIS — I95.2 HYPOTENSION DUE TO DRUGS: ICD-10-CM

## 2021-10-20 DIAGNOSIS — Z00.00 MEDICARE ANNUAL WELLNESS VISIT, SUBSEQUENT: ICD-10-CM

## 2021-10-20 PROBLEM — D62 ACUTE POST-HEMORRHAGIC ANEMIA: Status: RESOLVED | Noted: 2019-03-15 | Resolved: 2021-10-20

## 2021-10-20 PROCEDURE — 1170F FXNL STATUS ASSESSED: CPT | Performed by: FAMILY MEDICINE

## 2021-10-20 PROCEDURE — 99214 OFFICE O/P EST MOD 30 MIN: CPT | Performed by: FAMILY MEDICINE

## 2021-10-20 PROCEDURE — 1159F MED LIST DOCD IN RCRD: CPT | Performed by: FAMILY MEDICINE

## 2021-10-20 PROCEDURE — G0439 PPPS, SUBSEQ VISIT: HCPCS | Performed by: FAMILY MEDICINE

## 2021-10-21 LAB
ALBUMIN SERPL-MCNC: 4.8 G/DL (ref 3.6–4.6)
ALBUMIN/GLOB SERPL: 1.8 {RATIO} (ref 1.2–2.2)
ALP SERPL-CCNC: 39 IU/L (ref 44–121)
ALT SERPL-CCNC: 17 IU/L (ref 0–32)
AST SERPL-CCNC: 19 IU/L (ref 0–40)
BASOPHILS # BLD AUTO: 0 X10E3/UL (ref 0–0.2)
BASOPHILS NFR BLD AUTO: 0 %
BILIRUB SERPL-MCNC: 0.4 MG/DL (ref 0–1.2)
BUN SERPL-MCNC: 20 MG/DL (ref 8–27)
BUN/CREAT SERPL: 23 (ref 12–28)
CALCIUM SERPL-MCNC: 9.5 MG/DL (ref 8.7–10.3)
CHLORIDE SERPL-SCNC: 103 MMOL/L (ref 96–106)
CO2 SERPL-SCNC: 24 MMOL/L (ref 20–29)
CREAT SERPL-MCNC: 0.87 MG/DL (ref 0.57–1)
EOSINOPHIL # BLD AUTO: 0.1 X10E3/UL (ref 0–0.4)
EOSINOPHIL NFR BLD AUTO: 1 %
ERYTHROCYTE [DISTWIDTH] IN BLOOD BY AUTOMATED COUNT: 13.3 % (ref 11.7–15.4)
GLOBULIN SER CALC-MCNC: 2.7 G/DL (ref 1.5–4.5)
GLUCOSE SERPL-MCNC: 104 MG/DL (ref 65–99)
HCT VFR BLD AUTO: 36.9 % (ref 34–46.6)
HGB BLD-MCNC: 12.5 G/DL (ref 11.1–15.9)
IMM GRANULOCYTES # BLD AUTO: 0 X10E3/UL (ref 0–0.1)
IMM GRANULOCYTES NFR BLD AUTO: 0 %
LYMPHOCYTES # BLD AUTO: 2.4 X10E3/UL (ref 0.7–3.1)
LYMPHOCYTES NFR BLD AUTO: 24 %
MCH RBC QN AUTO: 30.7 PG (ref 26.6–33)
MCHC RBC AUTO-ENTMCNC: 33.9 G/DL (ref 31.5–35.7)
MCV RBC AUTO: 91 FL (ref 79–97)
MONOCYTES # BLD AUTO: 0.5 X10E3/UL (ref 0.1–0.9)
MONOCYTES NFR BLD AUTO: 5 %
NEUTROPHILS # BLD AUTO: 6.8 X10E3/UL (ref 1.4–7)
NEUTROPHILS NFR BLD AUTO: 70 %
PLATELET # BLD AUTO: 272 X10E3/UL (ref 150–450)
POTASSIUM SERPL-SCNC: 4.9 MMOL/L (ref 3.5–5.2)
PROT SERPL-MCNC: 7.5 G/DL (ref 6–8.5)
RBC # BLD AUTO: 4.07 X10E6/UL (ref 3.77–5.28)
SODIUM SERPL-SCNC: 140 MMOL/L (ref 134–144)
WBC # BLD AUTO: 9.8 X10E3/UL (ref 3.4–10.8)

## 2021-10-27 ENCOUNTER — HOSPITAL ENCOUNTER (OUTPATIENT)
Facility: SURGERY CENTER | Age: 86
Setting detail: HOSPITAL OUTPATIENT SURGERY
Discharge: HOME | End: 2021-10-27
Attending: ANESTHESIOLOGY | Admitting: ANESTHESIOLOGY

## 2021-10-27 ENCOUNTER — HOSPITAL ENCOUNTER (OUTPATIENT)
Dept: GENERAL RADIOLOGY | Facility: SURGERY CENTER | Age: 86
Setting detail: HOSPITAL OUTPATIENT SURGERY
End: 2021-10-27

## 2021-10-27 VITALS
DIASTOLIC BLOOD PRESSURE: 60 MMHG | SYSTOLIC BLOOD PRESSURE: 157 MMHG | WEIGHT: 153 LBS | BODY MASS INDEX: 23.96 KG/M2 | HEART RATE: 60 BPM | RESPIRATION RATE: 20 BRPM | OXYGEN SATURATION: 99 % | TEMPERATURE: 97.3 F

## 2021-10-27 DIAGNOSIS — M48.062 LUMBAR STENOSIS WITH NEUROGENIC CLAUDICATION: ICD-10-CM

## 2021-10-27 PROCEDURE — 62323 NJX INTERLAMINAR LMBR/SAC: CPT | Performed by: ANESTHESIOLOGY

## 2021-10-27 PROCEDURE — 3E0R33Z INTRODUCTION OF ANTI-INFLAMMATORY INTO SPINAL CANAL, PERCUTANEOUS APPROACH: ICD-10-PCS | Performed by: ANESTHESIOLOGY

## 2021-10-27 PROCEDURE — 76000 FLUOROSCOPY <1 HR PHYS/QHP: CPT

## 2021-10-27 PROCEDURE — 77002 NEEDLE LOCALIZATION BY XRAY: CPT

## 2021-10-27 PROCEDURE — 25010000002 DEXAMETHASONE SODIUM PHOSPHATE 100 MG/10ML SOLUTION 10 ML VIAL: Performed by: ANESTHESIOLOGY

## 2021-10-27 PROCEDURE — 0 IOHEXOL 300 MG/ML SOLUTION 10 ML VIAL: Performed by: ANESTHESIOLOGY

## 2021-10-27 PROCEDURE — B01BZZZ FLUOROSCOPY OF SPINAL CORD: ICD-10-PCS | Performed by: ANESTHESIOLOGY

## 2021-11-08 ENCOUNTER — TELEPHONE (OUTPATIENT)
Dept: PAIN MEDICINE | Facility: CLINIC | Age: 86
End: 2021-11-08

## 2021-11-08 DIAGNOSIS — M48.062 LUMBAR STENOSIS WITH NEUROGENIC CLAUDICATION: ICD-10-CM

## 2021-11-08 DIAGNOSIS — M54.16 LUMBAR RADICULITIS: Primary | ICD-10-CM

## 2021-11-08 RX ORDER — GABAPENTIN 100 MG/1
CAPSULE ORAL
Qty: 90 CAPSULE | Refills: 0 | Status: SHIPPED | OUTPATIENT
Start: 2021-11-08 | End: 2021-12-01

## 2021-12-01 ENCOUNTER — OFFICE VISIT (OUTPATIENT)
Dept: PAIN MEDICINE | Facility: CLINIC | Age: 86
End: 2021-12-01

## 2021-12-01 ENCOUNTER — TRANSCRIBE ORDERS (OUTPATIENT)
Dept: SURGERY | Facility: SURGERY CENTER | Age: 86
End: 2021-12-01

## 2021-12-01 ENCOUNTER — PREP FOR SURGERY (OUTPATIENT)
Dept: SURGERY | Facility: SURGERY CENTER | Age: 86
End: 2021-12-01

## 2021-12-01 ENCOUNTER — TELEPHONE (OUTPATIENT)
Dept: PAIN MEDICINE | Facility: CLINIC | Age: 86
End: 2021-12-01

## 2021-12-01 VITALS
RESPIRATION RATE: 16 BRPM | OXYGEN SATURATION: 98 % | HEIGHT: 67 IN | SYSTOLIC BLOOD PRESSURE: 154 MMHG | HEART RATE: 58 BPM | WEIGHT: 163.2 LBS | TEMPERATURE: 96.4 F | DIASTOLIC BLOOD PRESSURE: 62 MMHG | BODY MASS INDEX: 25.62 KG/M2

## 2021-12-01 DIAGNOSIS — M48.062 LUMBAR STENOSIS WITH NEUROGENIC CLAUDICATION: Primary | ICD-10-CM

## 2021-12-01 DIAGNOSIS — M54.16 LUMBAR RADICULITIS: ICD-10-CM

## 2021-12-01 DIAGNOSIS — Z41.9 SURGERY, ELECTIVE: Primary | ICD-10-CM

## 2021-12-01 PROCEDURE — 99214 OFFICE O/P EST MOD 30 MIN: CPT | Performed by: NURSE PRACTITIONER

## 2021-12-01 RX ORDER — GABAPENTIN 300 MG/1
300 CAPSULE ORAL 2 TIMES DAILY
Qty: 60 CAPSULE | Refills: 0 | Status: SHIPPED | OUTPATIENT
Start: 2021-12-01 | End: 2021-12-01

## 2021-12-01 RX ORDER — SODIUM CHLORIDE 0.9 % (FLUSH) 0.9 %
10 SYRINGE (ML) INJECTION AS NEEDED
Status: CANCELLED | OUTPATIENT
Start: 2021-12-01

## 2021-12-01 RX ORDER — SODIUM CHLORIDE 0.9 % (FLUSH) 0.9 %
10 SYRINGE (ML) INJECTION EVERY 12 HOURS SCHEDULED
Status: CANCELLED | OUTPATIENT
Start: 2021-12-01

## 2021-12-13 ENCOUNTER — HOSPITAL ENCOUNTER (OUTPATIENT)
Facility: SURGERY CENTER | Age: 86
Setting detail: HOSPITAL OUTPATIENT SURGERY
Discharge: HOME | End: 2021-12-13
Attending: ANESTHESIOLOGY | Admitting: ANESTHESIOLOGY

## 2021-12-13 ENCOUNTER — HOSPITAL ENCOUNTER (OUTPATIENT)
Dept: GENERAL RADIOLOGY | Facility: SURGERY CENTER | Age: 86
Setting detail: HOSPITAL OUTPATIENT SURGERY
End: 2021-12-13

## 2021-12-13 VITALS
WEIGHT: 155 LBS | SYSTOLIC BLOOD PRESSURE: 138 MMHG | DIASTOLIC BLOOD PRESSURE: 94 MMHG | HEIGHT: 67 IN | BODY MASS INDEX: 24.33 KG/M2 | RESPIRATION RATE: 16 BRPM | TEMPERATURE: 99.3 F | HEART RATE: 61 BPM | OXYGEN SATURATION: 98 %

## 2021-12-13 DIAGNOSIS — M48.062 LUMBAR STENOSIS WITH NEUROGENIC CLAUDICATION: ICD-10-CM

## 2021-12-13 DIAGNOSIS — Z41.9 SURGERY, ELECTIVE: ICD-10-CM

## 2021-12-13 DIAGNOSIS — M54.16 LUMBAR RADICULITIS: ICD-10-CM

## 2021-12-13 PROCEDURE — 77002 NEEDLE LOCALIZATION BY XRAY: CPT

## 2021-12-13 PROCEDURE — 62323 NJX INTERLAMINAR LMBR/SAC: CPT | Performed by: ANESTHESIOLOGY

## 2021-12-13 PROCEDURE — 3E0S3BZ INTRODUCTION OF ANESTHETIC AGENT INTO EPIDURAL SPACE, PERCUTANEOUS APPROACH: ICD-10-PCS | Performed by: ANESTHESIOLOGY

## 2021-12-13 PROCEDURE — 25010000002 DEXAMETHASONE SODIUM PHOSPHATE 100 MG/10ML SOLUTION 10 ML VIAL: Performed by: ANESTHESIOLOGY

## 2021-12-13 PROCEDURE — 76000 FLUOROSCOPY <1 HR PHYS/QHP: CPT

## 2021-12-13 PROCEDURE — 0 IOHEXOL 300 MG/ML SOLUTION 10 ML VIAL: Performed by: ANESTHESIOLOGY

## 2021-12-13 RX ORDER — SODIUM CHLORIDE 0.9 % (FLUSH) 0.9 %
10 SYRINGE (ML) INJECTION EVERY 12 HOURS SCHEDULED
Status: DISCONTINUED | OUTPATIENT
Start: 2021-12-13 | End: 2021-12-13 | Stop reason: HOSPADM

## 2021-12-13 RX ORDER — SODIUM CHLORIDE 0.9 % (FLUSH) 0.9 %
10 SYRINGE (ML) INJECTION AS NEEDED
Status: DISCONTINUED | OUTPATIENT
Start: 2021-12-13 | End: 2021-12-13 | Stop reason: HOSPADM

## 2022-01-08 DIAGNOSIS — M54.16 LUMBAR RADICULOPATHY: ICD-10-CM

## 2022-01-10 RX ORDER — MELOXICAM 15 MG/1
TABLET ORAL
Qty: 90 TABLET | Refills: 0 | Status: SHIPPED | OUTPATIENT
Start: 2022-01-10 | End: 2022-01-19 | Stop reason: SDUPTHER

## 2022-01-17 ENCOUNTER — OFFICE VISIT (OUTPATIENT)
Dept: PAIN MEDICINE | Facility: CLINIC | Age: 87
End: 2022-01-17

## 2022-01-17 VITALS
OXYGEN SATURATION: 100 % | HEART RATE: 50 BPM | BODY MASS INDEX: 24.96 KG/M2 | HEIGHT: 67 IN | DIASTOLIC BLOOD PRESSURE: 78 MMHG | WEIGHT: 159 LBS | TEMPERATURE: 97.1 F | SYSTOLIC BLOOD PRESSURE: 159 MMHG

## 2022-01-17 DIAGNOSIS — M48.062 LUMBAR STENOSIS WITH NEUROGENIC CLAUDICATION: Primary | ICD-10-CM

## 2022-01-17 DIAGNOSIS — M54.16 LUMBAR RADICULITIS: ICD-10-CM

## 2022-01-17 PROCEDURE — 99212 OFFICE O/P EST SF 10 MIN: CPT | Performed by: NURSE PRACTITIONER

## 2022-01-19 ENCOUNTER — TELEPHONE (OUTPATIENT)
Dept: FAMILY MEDICINE CLINIC | Facility: CLINIC | Age: 87
End: 2022-01-19

## 2022-01-19 DIAGNOSIS — M54.16 LUMBAR RADICULOPATHY: ICD-10-CM

## 2022-01-19 DIAGNOSIS — I10 BENIGN ESSENTIAL HYPERTENSION: ICD-10-CM

## 2022-01-19 RX ORDER — PROPRANOLOL HYDROCHLORIDE 40 MG/1
40 TABLET ORAL 2 TIMES DAILY
Qty: 180 TABLET | Refills: 1
Start: 2022-01-19 | End: 2022-06-03 | Stop reason: SDUPTHER

## 2022-01-19 RX ORDER — AMLODIPINE BESYLATE 10 MG/1
10 TABLET ORAL DAILY
Qty: 90 TABLET | Refills: 3 | Status: SHIPPED | OUTPATIENT
Start: 2022-01-19 | End: 2023-01-19 | Stop reason: SDUPTHER

## 2022-01-19 RX ORDER — MELOXICAM 15 MG/1
15 TABLET ORAL DAILY
Qty: 90 TABLET | Refills: 0 | Status: SHIPPED | OUTPATIENT
Start: 2022-01-19 | End: 2022-04-20 | Stop reason: SDUPTHER

## 2022-01-19 RX ORDER — LISINOPRIL 40 MG/1
40 TABLET ORAL DAILY
Qty: 90 TABLET | Refills: 3 | Status: SHIPPED | OUTPATIENT
Start: 2022-01-19 | End: 2022-08-05 | Stop reason: SDUPTHER

## 2022-02-28 ENCOUNTER — TELEPHONE (OUTPATIENT)
Dept: ONCOLOGY | Facility: HOSPITAL | Age: 87
End: 2022-02-28

## 2022-02-28 ENCOUNTER — TELEPHONE (OUTPATIENT)
Dept: FAMILY MEDICINE CLINIC | Facility: CLINIC | Age: 87
End: 2022-02-28

## 2022-02-28 DIAGNOSIS — M81.0 AGE-RELATED OSTEOPOROSIS WITHOUT CURRENT PATHOLOGICAL FRACTURE: ICD-10-CM

## 2022-02-28 DIAGNOSIS — I10 BENIGN ESSENTIAL HYPERTENSION: Primary | ICD-10-CM

## 2022-03-03 ENCOUNTER — INFUSION (OUTPATIENT)
Dept: ONCOLOGY | Facility: HOSPITAL | Age: 87
End: 2022-03-03

## 2022-03-03 ENCOUNTER — LAB (OUTPATIENT)
Dept: OTHER | Facility: HOSPITAL | Age: 87
End: 2022-03-03

## 2022-03-03 VITALS — BODY MASS INDEX: 24.9 KG/M2 | TEMPERATURE: 98.2 F | HEIGHT: 67 IN | RESPIRATION RATE: 18 BRPM

## 2022-03-03 DIAGNOSIS — M81.0 AGE-RELATED OSTEOPOROSIS WITHOUT CURRENT PATHOLOGICAL FRACTURE: Primary | ICD-10-CM

## 2022-03-03 PROCEDURE — 80053 COMPREHEN METABOLIC PANEL: CPT | Performed by: FAMILY MEDICINE

## 2022-03-03 PROCEDURE — 96372 THER/PROPH/DIAG INJ SC/IM: CPT

## 2022-03-03 PROCEDURE — 84100 ASSAY OF PHOSPHORUS: CPT | Performed by: FAMILY MEDICINE

## 2022-03-03 PROCEDURE — 83735 ASSAY OF MAGNESIUM: CPT | Performed by: FAMILY MEDICINE

## 2022-03-03 PROCEDURE — 25010000002 DENOSUMAB 60 MG/ML SOLUTION PREFILLED SYRINGE: Performed by: FAMILY MEDICINE

## 2022-03-03 RX ADMIN — DENOSUMAB 60 MG: 60 INJECTION SUBCUTANEOUS at 15:07

## 2022-04-20 ENCOUNTER — OFFICE VISIT (OUTPATIENT)
Dept: FAMILY MEDICINE CLINIC | Facility: CLINIC | Age: 87
End: 2022-04-20

## 2022-04-20 VITALS
HEART RATE: 54 BPM | HEIGHT: 67 IN | OXYGEN SATURATION: 98 % | SYSTOLIC BLOOD PRESSURE: 98 MMHG | TEMPERATURE: 97.3 F | DIASTOLIC BLOOD PRESSURE: 60 MMHG | BODY MASS INDEX: 25.33 KG/M2 | WEIGHT: 161.4 LBS

## 2022-04-20 DIAGNOSIS — I10 BENIGN ESSENTIAL HYPERTENSION: Primary | ICD-10-CM

## 2022-04-20 DIAGNOSIS — M48.062 SPINAL STENOSIS, LUMBAR REGION, WITH NEUROGENIC CLAUDICATION: ICD-10-CM

## 2022-04-20 DIAGNOSIS — R11.0 NAUSEA: ICD-10-CM

## 2022-04-20 DIAGNOSIS — M54.16 LUMBAR RADICULOPATHY: ICD-10-CM

## 2022-04-20 DIAGNOSIS — M79.675 PAIN IN TOES OF BOTH FEET: ICD-10-CM

## 2022-04-20 DIAGNOSIS — M79.674 PAIN IN TOES OF BOTH FEET: ICD-10-CM

## 2022-04-20 DIAGNOSIS — M48.062 LUMBAR STENOSIS WITH NEUROGENIC CLAUDICATION: ICD-10-CM

## 2022-04-20 DIAGNOSIS — M81.0 AGE-RELATED OSTEOPOROSIS WITHOUT CURRENT PATHOLOGICAL FRACTURE: ICD-10-CM

## 2022-04-20 DIAGNOSIS — M54.16 LUMBAR RADICULITIS: ICD-10-CM

## 2022-04-20 PROCEDURE — 99214 OFFICE O/P EST MOD 30 MIN: CPT | Performed by: FAMILY MEDICINE

## 2022-04-20 RX ORDER — MELOXICAM 15 MG/1
15 TABLET ORAL DAILY
Qty: 90 TABLET | Refills: 0 | Status: SHIPPED | OUTPATIENT
Start: 2022-04-20 | End: 2022-05-26

## 2022-04-21 LAB
ALBUMIN SERPL-MCNC: 4.7 G/DL (ref 3.6–4.6)
ALBUMIN/GLOB SERPL: 1.9 {RATIO} (ref 1.2–2.2)
ALP SERPL-CCNC: 38 IU/L (ref 44–121)
ALT SERPL-CCNC: 13 IU/L (ref 0–32)
AMYLASE SERPL-CCNC: 49 U/L (ref 31–110)
AST SERPL-CCNC: 16 IU/L (ref 0–40)
BASOPHILS # BLD AUTO: 0 X10E3/UL (ref 0–0.2)
BASOPHILS NFR BLD AUTO: 1 %
BILIRUB SERPL-MCNC: 0.3 MG/DL (ref 0–1.2)
BUN SERPL-MCNC: 24 MG/DL (ref 8–27)
BUN/CREAT SERPL: 24 (ref 12–28)
CALCIUM SERPL-MCNC: 9.7 MG/DL (ref 8.7–10.3)
CHLORIDE SERPL-SCNC: 106 MMOL/L (ref 96–106)
CO2 SERPL-SCNC: 22 MMOL/L (ref 20–29)
CREAT SERPL-MCNC: 0.98 MG/DL (ref 0.57–1)
EGFRCR SERPLBLD CKD-EPI 2021: 56 ML/MIN/1.73
EOSINOPHIL # BLD AUTO: 0.3 X10E3/UL (ref 0–0.4)
EOSINOPHIL NFR BLD AUTO: 3 %
ERYTHROCYTE [DISTWIDTH] IN BLOOD BY AUTOMATED COUNT: 12.4 % (ref 11.7–15.4)
GLOBULIN SER CALC-MCNC: 2.5 G/DL (ref 1.5–4.5)
GLUCOSE SERPL-MCNC: 103 MG/DL (ref 65–99)
HCT VFR BLD AUTO: 37.3 % (ref 34–46.6)
HGB BLD-MCNC: 12.4 G/DL (ref 11.1–15.9)
IMM GRANULOCYTES # BLD AUTO: 0 X10E3/UL (ref 0–0.1)
IMM GRANULOCYTES NFR BLD AUTO: 0 %
LIPASE SERPL-CCNC: 39 U/L (ref 14–85)
LYMPHOCYTES # BLD AUTO: 2.3 X10E3/UL (ref 0.7–3.1)
LYMPHOCYTES NFR BLD AUTO: 28 %
MCH RBC QN AUTO: 30.3 PG (ref 26.6–33)
MCHC RBC AUTO-ENTMCNC: 33.2 G/DL (ref 31.5–35.7)
MCV RBC AUTO: 91 FL (ref 79–97)
MONOCYTES # BLD AUTO: 0.6 X10E3/UL (ref 0.1–0.9)
MONOCYTES NFR BLD AUTO: 7 %
NEUTROPHILS # BLD AUTO: 5.1 X10E3/UL (ref 1.4–7)
NEUTROPHILS NFR BLD AUTO: 61 %
PLATELET # BLD AUTO: 301 X10E3/UL (ref 150–450)
POTASSIUM SERPL-SCNC: 5.2 MMOL/L (ref 3.5–5.2)
PROT SERPL-MCNC: 7.2 G/DL (ref 6–8.5)
RBC # BLD AUTO: 4.09 X10E6/UL (ref 3.77–5.28)
SODIUM SERPL-SCNC: 144 MMOL/L (ref 134–144)
WBC # BLD AUTO: 8.3 X10E3/UL (ref 3.4–10.8)

## 2022-05-26 DIAGNOSIS — M54.16 LUMBAR RADICULOPATHY: ICD-10-CM

## 2022-05-26 RX ORDER — MELOXICAM 15 MG/1
15 TABLET ORAL DAILY
Qty: 90 TABLET | Refills: 0 | Status: SHIPPED | OUTPATIENT
Start: 2022-05-26 | End: 2022-07-11 | Stop reason: SDUPTHER

## 2022-06-02 ENCOUNTER — TELEPHONE (OUTPATIENT)
Dept: FAMILY MEDICINE CLINIC | Facility: CLINIC | Age: 87
End: 2022-06-02

## 2022-06-02 DIAGNOSIS — I10 BENIGN ESSENTIAL HYPERTENSION: ICD-10-CM

## 2022-06-03 RX ORDER — PROPRANOLOL HYDROCHLORIDE 40 MG/1
40 TABLET ORAL 2 TIMES DAILY
Qty: 180 TABLET | Refills: 1 | Status: SHIPPED | OUTPATIENT
Start: 2022-06-03 | End: 2022-08-17 | Stop reason: SDUPTHER

## 2022-07-07 ENCOUNTER — HOSPITAL ENCOUNTER (OUTPATIENT)
Dept: ULTRASOUND IMAGING | Facility: HOSPITAL | Age: 87
Discharge: HOME | End: 2022-07-07
Admitting: FAMILY MEDICINE

## 2022-07-07 ENCOUNTER — TELEPHONE (OUTPATIENT)
Dept: FAMILY MEDICINE CLINIC | Facility: CLINIC | Age: 87
End: 2022-07-07

## 2022-07-07 DIAGNOSIS — R11.0 NAUSEA: ICD-10-CM

## 2022-07-07 PROCEDURE — 76705 ECHO EXAM OF ABDOMEN: CPT

## 2022-07-11 ENCOUNTER — OFFICE VISIT (OUTPATIENT)
Dept: FAMILY MEDICINE CLINIC | Facility: CLINIC | Age: 87
End: 2022-07-11

## 2022-07-11 VITALS
DIASTOLIC BLOOD PRESSURE: 63 MMHG | HEIGHT: 67 IN | OXYGEN SATURATION: 97 % | HEART RATE: 58 BPM | WEIGHT: 159.2 LBS | BODY MASS INDEX: 24.99 KG/M2 | RESPIRATION RATE: 14 BRPM | TEMPERATURE: 98.2 F | SYSTOLIC BLOOD PRESSURE: 144 MMHG

## 2022-07-11 DIAGNOSIS — M54.16 LUMBAR RADICULOPATHY: ICD-10-CM

## 2022-07-11 DIAGNOSIS — R42 DIZZINESS: Primary | ICD-10-CM

## 2022-07-11 DIAGNOSIS — R82.90 ABNORMAL URINE: ICD-10-CM

## 2022-07-11 DIAGNOSIS — I10 PRIMARY HYPERTENSION: ICD-10-CM

## 2022-07-11 LAB
BILIRUB BLD-MCNC: NEGATIVE MG/DL
CLARITY, POC: CLEAR
COLOR UR: YELLOW
EXPIRATION DATE: ABNORMAL
GLUCOSE UR STRIP-MCNC: NEGATIVE MG/DL
KETONES UR QL: NEGATIVE
LEUKOCYTE EST, POC: ABNORMAL
Lab: ABNORMAL
NITRITE UR-MCNC: NEGATIVE MG/ML
PH UR: 6.5 [PH] (ref 5–8)
PROT UR STRIP-MCNC: NEGATIVE MG/DL
RBC # UR STRIP: ABNORMAL /UL
SP GR UR: 1.01 (ref 1–1.03)
UROBILINOGEN UR QL: NORMAL

## 2022-07-11 PROCEDURE — 99214 OFFICE O/P EST MOD 30 MIN: CPT | Performed by: FAMILY MEDICINE

## 2022-07-11 PROCEDURE — 81003 URINALYSIS AUTO W/O SCOPE: CPT | Performed by: FAMILY MEDICINE

## 2022-07-11 RX ORDER — MECLIZINE HYDROCHLORIDE 25 MG/1
25 TABLET ORAL 2 TIMES DAILY PRN
Qty: 60 TABLET | Refills: 0 | Status: SHIPPED | OUTPATIENT
Start: 2022-07-11 | End: 2022-07-21

## 2022-07-11 RX ORDER — MELOXICAM 15 MG/1
15 TABLET ORAL DAILY PRN
Qty: 90 TABLET | Refills: 0 | Status: SHIPPED | OUTPATIENT
Start: 2022-07-11 | End: 2022-10-05

## 2022-07-15 LAB
ALBUMIN SERPL-MCNC: 4.8 G/DL (ref 3.6–4.6)
ALBUMIN/GLOB SERPL: 1.7 {RATIO} (ref 1.2–2.2)
ALP SERPL-CCNC: 45 IU/L (ref 44–121)
ALT SERPL-CCNC: 19 IU/L (ref 0–32)
AST SERPL-CCNC: 19 IU/L (ref 0–40)
BACTERIA UR CULT: ABNORMAL
BACTERIA UR CULT: ABNORMAL
BASOPHILS # BLD AUTO: 0.1 X10E3/UL (ref 0–0.2)
BASOPHILS NFR BLD AUTO: 1 %
BILIRUB SERPL-MCNC: 0.4 MG/DL (ref 0–1.2)
BUN SERPL-MCNC: 17 MG/DL (ref 8–27)
BUN/CREAT SERPL: 20 (ref 12–28)
CALCIUM SERPL-MCNC: 10.1 MG/DL (ref 8.7–10.3)
CARBAMAZEPINE FREE SERPL-MCNC: ABNORMAL UG/ML (ref 0.6–4.2)
CARBAMAZEPINE SERPL-MCNC: <2 UG/ML (ref 4–12)
CHLORIDE SERPL-SCNC: 100 MMOL/L (ref 96–106)
CO2 SERPL-SCNC: 26 MMOL/L (ref 20–29)
CREAT SERPL-MCNC: 0.83 MG/DL (ref 0.57–1)
EGFRCR SERPLBLD CKD-EPI 2021: 68 ML/MIN/1.73
EOSINOPHIL # BLD AUTO: 0.2 X10E3/UL (ref 0–0.4)
EOSINOPHIL NFR BLD AUTO: 2 %
ERYTHROCYTE [DISTWIDTH] IN BLOOD BY AUTOMATED COUNT: 12 % (ref 11.7–15.4)
GLOBULIN SER CALC-MCNC: 2.8 G/DL (ref 1.5–4.5)
GLUCOSE SERPL-MCNC: 98 MG/DL (ref 65–99)
HCT VFR BLD AUTO: 38.8 % (ref 34–46.6)
HGB BLD-MCNC: 12.8 G/DL (ref 11.1–15.9)
IMM GRANULOCYTES # BLD AUTO: 0 X10E3/UL (ref 0–0.1)
IMM GRANULOCYTES NFR BLD AUTO: 0 %
LYMPHOCYTES # BLD AUTO: 2.5 X10E3/UL (ref 0.7–3.1)
LYMPHOCYTES NFR BLD AUTO: 24 %
MCH RBC QN AUTO: 30.1 PG (ref 26.6–33)
MCHC RBC AUTO-ENTMCNC: 33 G/DL (ref 31.5–35.7)
MCV RBC AUTO: 91 FL (ref 79–97)
MONOCYTES # BLD AUTO: 0.8 X10E3/UL (ref 0.1–0.9)
MONOCYTES NFR BLD AUTO: 7 %
NEUTROPHILS # BLD AUTO: 6.8 X10E3/UL (ref 1.4–7)
NEUTROPHILS NFR BLD AUTO: 66 %
OTHER ANTIBIOTIC SUSC ISLT: ABNORMAL
PLATELET # BLD AUTO: 358 X10E3/UL (ref 150–450)
POTASSIUM SERPL-SCNC: 5.3 MMOL/L (ref 3.5–5.2)
PROT SERPL-MCNC: 7.6 G/DL (ref 6–8.5)
RBC # BLD AUTO: 4.25 X10E6/UL (ref 3.77–5.28)
SODIUM SERPL-SCNC: 141 MMOL/L (ref 134–144)
TSH SERPL DL<=0.005 MIU/L-ACNC: 2.52 UIU/ML (ref 0.45–4.5)
WBC # BLD AUTO: 10.3 X10E3/UL (ref 3.4–10.8)

## 2022-07-16 DIAGNOSIS — R82.90 ABNORMAL URINE: Primary | ICD-10-CM

## 2022-07-16 RX ORDER — SULFAMETHOXAZOLE AND TRIMETHOPRIM 800; 160 MG/1; MG/1
1 TABLET ORAL 2 TIMES DAILY
Qty: 14 TABLET | Refills: 0 | Status: SHIPPED | OUTPATIENT
Start: 2022-07-16 | End: 2022-07-27

## 2022-07-16 RX ORDER — SULFAMETHOXAZOLE AND TRIMETHOPRIM 800; 160 MG/1; MG/1
1 TABLET ORAL 2 TIMES DAILY
Qty: 14 TABLET | Refills: 0 | Status: SHIPPED | OUTPATIENT
Start: 2022-07-16 | End: 2022-07-16 | Stop reason: SDUPTHER

## 2022-07-27 ENCOUNTER — APPOINTMENT (OUTPATIENT)
Dept: CT IMAGING | Facility: HOSPITAL | Age: 87
End: 2022-07-27

## 2022-07-27 ENCOUNTER — APPOINTMENT (OUTPATIENT)
Dept: GENERAL RADIOLOGY | Facility: HOSPITAL | Age: 87
End: 2022-07-27

## 2022-07-27 ENCOUNTER — OFFICE VISIT (OUTPATIENT)
Dept: FAMILY MEDICINE CLINIC | Facility: CLINIC | Age: 87
End: 2022-07-27

## 2022-07-27 ENCOUNTER — HOSPITAL ENCOUNTER (OUTPATIENT)
Facility: HOSPITAL | Age: 87
Setting detail: OBSERVATION
Discharge: HOME | End: 2022-07-29
Attending: EMERGENCY MEDICINE | Admitting: EMERGENCY MEDICINE

## 2022-07-27 VITALS
BODY MASS INDEX: 24.77 KG/M2 | HEIGHT: 67 IN | RESPIRATION RATE: 14 BRPM | TEMPERATURE: 98.4 F | WEIGHT: 157.8 LBS | SYSTOLIC BLOOD PRESSURE: 143 MMHG | HEART RATE: 51 BPM | DIASTOLIC BLOOD PRESSURE: 69 MMHG | OXYGEN SATURATION: 97 %

## 2022-07-27 DIAGNOSIS — R42 DIZZINESS: ICD-10-CM

## 2022-07-27 DIAGNOSIS — R06.02 SHORTNESS OF BREATH: ICD-10-CM

## 2022-07-27 DIAGNOSIS — R91.1 PULMONARY NODULE: ICD-10-CM

## 2022-07-27 DIAGNOSIS — R51.9 SEVERE HEADACHE: ICD-10-CM

## 2022-07-27 DIAGNOSIS — E87.5 HYPERKALEMIA: ICD-10-CM

## 2022-07-27 DIAGNOSIS — R53.83 FATIGUE, UNSPECIFIED TYPE: ICD-10-CM

## 2022-07-27 DIAGNOSIS — R82.90 ABNORMAL URINE: Primary | ICD-10-CM

## 2022-07-27 DIAGNOSIS — E87.5 HYPERKALEMIA: Primary | ICD-10-CM

## 2022-07-27 DIAGNOSIS — R94.31 ABNORMAL EKG: ICD-10-CM

## 2022-07-27 DIAGNOSIS — E87.1 HYPONATREMIA: ICD-10-CM

## 2022-07-27 PROBLEM — R53.1 GENERALIZED WEAKNESS: Status: ACTIVE | Noted: 2022-07-27

## 2022-07-27 LAB
ALBUMIN SERPL-MCNC: 4.5 G/DL (ref 3.5–5.2)
ALBUMIN/GLOB SERPL: 1.6 G/DL
ALP SERPL-CCNC: 34 U/L (ref 39–117)
ALT SERPL W P-5'-P-CCNC: 13 U/L (ref 1–33)
ANION GAP SERPL CALCULATED.3IONS-SCNC: 12 MMOL/L (ref 5–15)
AST SERPL-CCNC: 18 U/L (ref 1–32)
BASOPHILS # BLD AUTO: 0.05 10*3/MM3 (ref 0–0.2)
BASOPHILS NFR BLD AUTO: 0.7 % (ref 0–1.5)
BILIRUB BLD-MCNC: NEGATIVE MG/DL
BILIRUB SERPL-MCNC: 0.4 MG/DL (ref 0–1.2)
BUN SERPL-MCNC: 19 MG/DL (ref 8–23)
BUN/CREAT SERPL: 17.4 (ref 7–25)
CALCIUM SPEC-SCNC: 10.1 MG/DL (ref 8.6–10.5)
CHLORIDE SERPL-SCNC: 93 MMOL/L (ref 98–107)
CLARITY, POC: CLEAR
CO2 SERPL-SCNC: 22 MMOL/L (ref 22–29)
COLOR UR: YELLOW
CREAT SERPL-MCNC: 1.09 MG/DL (ref 0.57–1)
D DIMER PPP FEU-MCNC: 1.81 MCGFEU/ML (ref 0–0.49)
DEPRECATED RDW RBC AUTO: 39.1 FL (ref 37–54)
EGFRCR SERPLBLD CKD-EPI 2021: 49 ML/MIN/1.73
EOSINOPHIL # BLD AUTO: 0.2 10*3/MM3 (ref 0–0.4)
EOSINOPHIL NFR BLD AUTO: 2.6 % (ref 0.3–6.2)
ERYTHROCYTE [DISTWIDTH] IN BLOOD BY AUTOMATED COUNT: 12.1 % (ref 12.3–15.4)
EXPIRATION DATE: NORMAL
GLOBULIN UR ELPH-MCNC: 2.8 GM/DL
GLUCOSE SERPL-MCNC: 101 MG/DL (ref 65–99)
GLUCOSE UR STRIP-MCNC: NEGATIVE MG/DL
HCT VFR BLD AUTO: 36.1 % (ref 34–46.6)
HGB BLD-MCNC: 11.9 G/DL (ref 12–15.9)
HOLD SPECIMEN: NORMAL
IMM GRANULOCYTES # BLD AUTO: 0.01 10*3/MM3 (ref 0–0.05)
IMM GRANULOCYTES NFR BLD AUTO: 0.1 % (ref 0–0.5)
KETONES UR QL: NEGATIVE
LEUKOCYTE EST, POC: NEGATIVE
LYMPHOCYTES # BLD AUTO: 2.19 10*3/MM3 (ref 0.7–3.1)
LYMPHOCYTES NFR BLD AUTO: 28.7 % (ref 19.6–45.3)
Lab: NORMAL
MCH RBC QN AUTO: 29.8 PG (ref 26.6–33)
MCHC RBC AUTO-ENTMCNC: 33 G/DL (ref 31.5–35.7)
MCV RBC AUTO: 90.3 FL (ref 79–97)
MONOCYTES # BLD AUTO: 0.49 10*3/MM3 (ref 0.1–0.9)
MONOCYTES NFR BLD AUTO: 6.4 % (ref 5–12)
NEUTROPHILS NFR BLD AUTO: 4.69 10*3/MM3 (ref 1.7–7)
NEUTROPHILS NFR BLD AUTO: 61.5 % (ref 42.7–76)
NITRITE UR-MCNC: NEGATIVE MG/ML
NRBC BLD AUTO-RTO: 0 /100 WBC (ref 0–0.2)
NT-PROBNP SERPL-MCNC: 307 PG/ML (ref 0–1800)
PH UR: 7 [PH] (ref 5–8)
PLATELET # BLD AUTO: 266 10*3/MM3 (ref 140–450)
PMV BLD AUTO: 9.2 FL (ref 6–12)
POTASSIUM SERPL-SCNC: 5.3 MMOL/L (ref 3.5–5.2)
PROT SERPL-MCNC: 7.3 G/DL (ref 6–8.5)
PROT UR STRIP-MCNC: NEGATIVE MG/DL
QT INTERVAL: 405 MS
RBC # BLD AUTO: 4 10*6/MM3 (ref 3.77–5.28)
RBC # UR STRIP: NEGATIVE /UL
SARS-COV-2 ORF1AB RESP QL NAA+PROBE: NOT DETECTED
SODIUM SERPL-SCNC: 127 MMOL/L (ref 136–145)
SP GR UR: 1.01 (ref 1–1.03)
TROPONIN T SERPL-MCNC: <0.01 NG/ML (ref 0–0.03)
UROBILINOGEN UR QL: NORMAL
WBC NRBC COR # BLD: 7.63 10*3/MM3 (ref 3.4–10.8)
WHOLE BLOOD HOLD COAG: NORMAL
WHOLE BLOOD HOLD SPECIMEN: NORMAL

## 2022-07-27 PROCEDURE — 85379 FIBRIN DEGRADATION QUANT: CPT | Performed by: NURSE PRACTITIONER

## 2022-07-27 PROCEDURE — 93005 ELECTROCARDIOGRAM TRACING: CPT

## 2022-07-27 PROCEDURE — C9803 HOPD COVID-19 SPEC COLLECT: HCPCS

## 2022-07-27 PROCEDURE — 93010 ELECTROCARDIOGRAM REPORT: CPT | Performed by: INTERNAL MEDICINE

## 2022-07-27 PROCEDURE — 83880 ASSAY OF NATRIURETIC PEPTIDE: CPT

## 2022-07-27 PROCEDURE — 80053 COMPREHEN METABOLIC PANEL: CPT

## 2022-07-27 PROCEDURE — 99283 EMERGENCY DEPT VISIT LOW MDM: CPT

## 2022-07-27 PROCEDURE — 71045 X-RAY EXAM CHEST 1 VIEW: CPT

## 2022-07-27 PROCEDURE — U0005 INFEC AGEN DETEC AMPLI PROBE: HCPCS | Performed by: NURSE PRACTITIONER

## 2022-07-27 PROCEDURE — G0378 HOSPITAL OBSERVATION PER HR: HCPCS

## 2022-07-27 PROCEDURE — 85025 COMPLETE CBC W/AUTO DIFF WBC: CPT

## 2022-07-27 PROCEDURE — 99214 OFFICE O/P EST MOD 30 MIN: CPT | Performed by: FAMILY MEDICINE

## 2022-07-27 PROCEDURE — 84484 ASSAY OF TROPONIN QUANT: CPT

## 2022-07-27 PROCEDURE — 81003 URINALYSIS AUTO W/O SCOPE: CPT | Performed by: FAMILY MEDICINE

## 2022-07-27 PROCEDURE — 96360 HYDRATION IV INFUSION INIT: CPT

## 2022-07-27 PROCEDURE — 0 IOPAMIDOL PER 1 ML: Performed by: EMERGENCY MEDICINE

## 2022-07-27 PROCEDURE — 93000 ELECTROCARDIOGRAM COMPLETE: CPT | Performed by: FAMILY MEDICINE

## 2022-07-27 PROCEDURE — U0004 COV-19 TEST NON-CDC HGH THRU: HCPCS | Performed by: NURSE PRACTITIONER

## 2022-07-27 PROCEDURE — 71275 CT ANGIOGRAPHY CHEST: CPT

## 2022-07-27 PROCEDURE — 93005 ELECTROCARDIOGRAM TRACING: CPT | Performed by: EMERGENCY MEDICINE

## 2022-07-27 RX ORDER — PANTOPRAZOLE SODIUM 40 MG/1
40 TABLET, DELAYED RELEASE ORAL EVERY MORNING
Status: DISCONTINUED | OUTPATIENT
Start: 2022-07-28 | End: 2022-07-29 | Stop reason: HOSPADM

## 2022-07-27 RX ORDER — AMLODIPINE BESYLATE 10 MG/1
10 TABLET ORAL DAILY
Status: DISCONTINUED | OUTPATIENT
Start: 2022-07-28 | End: 2022-07-29 | Stop reason: HOSPADM

## 2022-07-27 RX ORDER — LISINOPRIL 20 MG/1
40 TABLET ORAL DAILY
Status: DISCONTINUED | OUTPATIENT
Start: 2022-07-28 | End: 2022-07-29 | Stop reason: HOSPADM

## 2022-07-27 RX ORDER — PROPRANOLOL HYDROCHLORIDE 20 MG/1
40 TABLET ORAL 2 TIMES DAILY
Status: DISCONTINUED | OUTPATIENT
Start: 2022-07-27 | End: 2022-07-29 | Stop reason: HOSPADM

## 2022-07-27 RX ORDER — SODIUM CHLORIDE 0.9 % (FLUSH) 0.9 %
10 SYRINGE (ML) INJECTION AS NEEDED
Status: DISCONTINUED | OUTPATIENT
Start: 2022-07-27 | End: 2022-07-29 | Stop reason: HOSPADM

## 2022-07-27 RX ORDER — SODIUM CHLORIDE 9 MG/ML
125 INJECTION, SOLUTION INTRAVENOUS CONTINUOUS
Status: DISCONTINUED | OUTPATIENT
Start: 2022-07-27 | End: 2022-07-28

## 2022-07-27 RX ADMIN — SODIUM CHLORIDE 125 ML/HR: 9 INJECTION, SOLUTION INTRAVENOUS at 22:47

## 2022-07-27 RX ADMIN — SODIUM CHLORIDE 1000 ML: 9 INJECTION, SOLUTION INTRAVENOUS at 15:25

## 2022-07-27 RX ADMIN — Medication 10 ML: at 13:38

## 2022-07-27 RX ADMIN — IOPAMIDOL 95 ML: 755 INJECTION, SOLUTION INTRAVENOUS at 17:22

## 2022-07-28 ENCOUNTER — APPOINTMENT (OUTPATIENT)
Dept: CARDIOLOGY | Facility: HOSPITAL | Age: 87
End: 2022-07-28

## 2022-07-28 LAB
ALBUMIN SERPL-MCNC: 3.4 G/DL (ref 3.5–5.2)
ALBUMIN/GLOB SERPL: 1.6 G/DL
ALP SERPL-CCNC: 27 U/L (ref 39–117)
ALT SERPL W P-5'-P-CCNC: 11 U/L (ref 1–33)
ANION GAP SERPL CALCULATED.3IONS-SCNC: 9 MMOL/L (ref 5–15)
AORTIC DIMENSIONLESS INDEX: 0.9 (DI)
ASCENDING AORTA: 3 CM
AST SERPL-CCNC: 14 U/L (ref 1–32)
BH CV ECHO LEFT VENTRICLE GLOBAL LONGITUDINAL STRAIN: -20 %
BH CV ECHO MEAS - ACS: 1.97 CM
BH CV ECHO MEAS - AO MAX PG: 8.8 MMHG
BH CV ECHO MEAS - AO MEAN PG: 4.4 MMHG
BH CV ECHO MEAS - AO ROOT DIAM: 3 CM
BH CV ECHO MEAS - AO V2 MAX: 148.6 CM/SEC
BH CV ECHO MEAS - AO V2 VTI: 33.7 CM
BH CV ECHO MEAS - AVA(I,D): 3 CM2
BH CV ECHO MEAS - CONTRAST EF 4CH: 78 CM2
BH CV ECHO MEAS - EDV(CUBED): 112.7 ML
BH CV ECHO MEAS - EDV(MOD-SP2): 94 ML
BH CV ECHO MEAS - EDV(MOD-SP4): 84 ML
BH CV ECHO MEAS - EF(MOD-BP): 77.9 %
BH CV ECHO MEAS - EF(MOD-SP2): 73.4 %
BH CV ECHO MEAS - EF(MOD-SP4): 82.1 %
BH CV ECHO MEAS - ESV(CUBED): 24.2 ML
BH CV ECHO MEAS - ESV(MOD-SP2): 25 ML
BH CV ECHO MEAS - ESV(MOD-SP4): 15 ML
BH CV ECHO MEAS - FS: 40.1 %
BH CV ECHO MEAS - IVS/LVPW: 1.06 CM
BH CV ECHO MEAS - IVSD: 0.8 CM
BH CV ECHO MEAS - LAT PEAK E' VEL: 8.8 CM/SEC
BH CV ECHO MEAS - LV MASS(C)D: 123.2 GRAMS
BH CV ECHO MEAS - LV MAX PG: 8.4 MMHG
BH CV ECHO MEAS - LV MEAN PG: 4.5 MMHG
BH CV ECHO MEAS - LV V1 MAX: 145.1 CM/SEC
BH CV ECHO MEAS - LV V1 VTI: 32.1 CM
BH CV ECHO MEAS - LVIDD: 4.8 CM
BH CV ECHO MEAS - LVIDS: 2.9 CM
BH CV ECHO MEAS - LVOT AREA: 3.2 CM2
BH CV ECHO MEAS - LVOT DIAM: 2.01 CM
BH CV ECHO MEAS - LVPWD: 0.75 CM
BH CV ECHO MEAS - MED PEAK E' VEL: 6.6 CM/SEC
BH CV ECHO MEAS - MV A DUR: 0.16 SEC
BH CV ECHO MEAS - MV A MAX VEL: 113.1 CM/SEC
BH CV ECHO MEAS - MV DEC SLOPE: 404.6 CM/SEC2
BH CV ECHO MEAS - MV DEC TIME: 230 MSEC
BH CV ECHO MEAS - MV E MAX VEL: 101 CM/SEC
BH CV ECHO MEAS - MV E/A: 0.89
BH CV ECHO MEAS - MV MAX PG: 5 MMHG
BH CV ECHO MEAS - MV MEAN PG: 1.99 MMHG
BH CV ECHO MEAS - MV P1/2T: 85.4 MSEC
BH CV ECHO MEAS - MV V2 VTI: 42.7 CM
BH CV ECHO MEAS - MVA(P1/2T): 2.6 CM2
BH CV ECHO MEAS - MVA(VTI): 2.39 CM2
BH CV ECHO MEAS - PA ACC TIME: 0.12 SEC
BH CV ECHO MEAS - PA PR(ACCEL): 26.7 MMHG
BH CV ECHO MEAS - PA V2 MAX: 132 CM/SEC
BH CV ECHO MEAS - PI END-D VEL: 141.2 CM/SEC
BH CV ECHO MEAS - PULM A REVS DUR: 0.13 SEC
BH CV ECHO MEAS - PULM A REVS VEL: 30.8 CM/SEC
BH CV ECHO MEAS - PULM DIAS VEL: 32.6 CM/SEC
BH CV ECHO MEAS - PULM S/D: 1.11
BH CV ECHO MEAS - PULM SYS VEL: 36 CM/SEC
BH CV ECHO MEAS - QP/QS: 0.23
BH CV ECHO MEAS - RAP SYSTOLE: 3 MMHG
BH CV ECHO MEAS - RV MAX PG: 3.1 MMHG
BH CV ECHO MEAS - RV V1 MAX: 87.4 CM/SEC
BH CV ECHO MEAS - RV V1 VTI: 20.5 CM
BH CV ECHO MEAS - RVOT DIAM: 1.22 CM
BH CV ECHO MEAS - RVSP: 36 MMHG
BH CV ECHO MEAS - SV(LVOT): 101.9 ML
BH CV ECHO MEAS - SV(MOD-SP2): 69 ML
BH CV ECHO MEAS - SV(MOD-SP4): 69 ML
BH CV ECHO MEAS - SV(RVOT): 23.8 ML
BH CV ECHO MEAS - TAPSE (>1.6): 2.3 CM
BH CV ECHO MEAS - TR MAX PG: 33 MMHG
BH CV ECHO MEAS - TR MAX VEL: 287.2 CM/SEC
BH CV ECHO MEASUREMENTS AVERAGE E/E' RATIO: 13.12
BH CV VAS BP RIGHT ARM: NORMAL MMHG
BH CV XLRA - RV BASE: 2.8 CM
BH CV XLRA - RV LENGTH: 5.4 CM
BH CV XLRA - RV MID: 2.23 CM
BH CV XLRA - TDI S': 14.4 CM/SEC
BILIRUB SERPL-MCNC: 0.3 MG/DL (ref 0–1.2)
BUN SERPL-MCNC: 20 MG/DL (ref 8–23)
BUN/CREAT SERPL: 19.4 (ref 7–25)
CALCIUM SPEC-SCNC: 8.7 MG/DL (ref 8.6–10.5)
CHLORIDE SERPL-SCNC: 104 MMOL/L (ref 98–107)
CO2 SERPL-SCNC: 22 MMOL/L (ref 22–29)
CORTIS SERPL-MCNC: 5.71 MCG/DL
CREAT SERPL-MCNC: 1.03 MG/DL (ref 0.57–1)
EGFRCR SERPLBLD CKD-EPI 2021: 52.4 ML/MIN/1.73
GLOBULIN UR ELPH-MCNC: 2.1 GM/DL
GLUCOSE SERPL-MCNC: 83 MG/DL (ref 65–99)
LEFT ATRIUM VOLUME INDEX: 25.4 ML/M2
LV EF 2D ECHO EST: 78 %
MAXIMAL PREDICTED HEART RATE: 132 BPM
POTASSIUM SERPL-SCNC: 4.8 MMOL/L (ref 3.5–5.2)
PROT SERPL-MCNC: 5.5 G/DL (ref 6–8.5)
SINUS: 2.7 CM
SODIUM SERPL-SCNC: 135 MMOL/L (ref 136–145)
STJ: 2.7 CM
STRESS TARGET HR: 112 BPM
T3FREE SERPL-MCNC: 1.95 PG/ML (ref 2–4.4)
T4 FREE SERPL-MCNC: 1.17 NG/DL (ref 0.93–1.7)
TSH SERPL DL<=0.05 MIU/L-ACNC: 4.39 UIU/ML (ref 0.27–4.2)

## 2022-07-28 PROCEDURE — 94799 UNLISTED PULMONARY SVC/PX: CPT

## 2022-07-28 PROCEDURE — 80053 COMPREHEN METABOLIC PANEL: CPT | Performed by: NURSE PRACTITIONER

## 2022-07-28 PROCEDURE — 84481 FREE ASSAY (FT-3): CPT | Performed by: NURSE PRACTITIONER

## 2022-07-28 PROCEDURE — 25010000002 PERFLUTREN (DEFINITY) 8.476 MG IN SODIUM CHLORIDE (PF) 0.9 % 10 ML INJECTION: Performed by: INTERNAL MEDICINE

## 2022-07-28 PROCEDURE — 84439 ASSAY OF FREE THYROXINE: CPT | Performed by: NURSE PRACTITIONER

## 2022-07-28 PROCEDURE — G0378 HOSPITAL OBSERVATION PER HR: HCPCS

## 2022-07-28 PROCEDURE — 82533 TOTAL CORTISOL: CPT | Performed by: NURSE PRACTITIONER

## 2022-07-28 PROCEDURE — 93306 TTE W/DOPPLER COMPLETE: CPT | Performed by: INTERNAL MEDICINE

## 2022-07-28 PROCEDURE — 97161 PT EVAL LOW COMPLEX 20 MIN: CPT

## 2022-07-28 PROCEDURE — 96361 HYDRATE IV INFUSION ADD-ON: CPT

## 2022-07-28 PROCEDURE — 84443 ASSAY THYROID STIM HORMONE: CPT | Performed by: NURSE PRACTITIONER

## 2022-07-28 PROCEDURE — 94762 N-INVAS EAR/PLS OXIMTRY CONT: CPT

## 2022-07-28 PROCEDURE — 82024 ASSAY OF ACTH: CPT | Performed by: NURSE PRACTITIONER

## 2022-07-28 PROCEDURE — 93306 TTE W/DOPPLER COMPLETE: CPT

## 2022-07-28 PROCEDURE — 97530 THERAPEUTIC ACTIVITIES: CPT

## 2022-07-28 RX ORDER — SODIUM CHLORIDE 9 MG/ML
125 INJECTION, SOLUTION INTRAVENOUS CONTINUOUS
Status: DISCONTINUED | OUTPATIENT
Start: 2022-07-28 | End: 2022-07-29

## 2022-07-28 RX ADMIN — SODIUM CHLORIDE 125 ML/HR: 9 INJECTION, SOLUTION INTRAVENOUS at 23:22

## 2022-07-28 RX ADMIN — LISINOPRIL 40 MG: 20 TABLET ORAL at 08:20

## 2022-07-28 RX ADMIN — AMLODIPINE BESYLATE 10 MG: 10 TABLET ORAL at 08:20

## 2022-07-28 RX ADMIN — PANTOPRAZOLE SODIUM 40 MG: 40 TABLET, DELAYED RELEASE ORAL at 08:20

## 2022-07-28 RX ADMIN — PROPRANOLOL HYDROCHLORIDE 40 MG: 20 TABLET ORAL at 08:20

## 2022-07-28 RX ADMIN — Medication 10 ML: at 08:20

## 2022-07-28 RX ADMIN — SODIUM CHLORIDE 125 ML/HR: 9 INJECTION, SOLUTION INTRAVENOUS at 06:12

## 2022-07-28 RX ADMIN — PERFLUTREN 2 ML: 6.52 INJECTION, SUSPENSION INTRAVENOUS at 10:45

## 2022-07-29 ENCOUNTER — APPOINTMENT (OUTPATIENT)
Dept: NUCLEAR MEDICINE | Facility: HOSPITAL | Age: 87
End: 2022-07-29

## 2022-07-29 ENCOUNTER — APPOINTMENT (OUTPATIENT)
Dept: CARDIOLOGY | Facility: HOSPITAL | Age: 87
End: 2022-07-29

## 2022-07-29 ENCOUNTER — READMISSION MANAGEMENT (OUTPATIENT)
Dept: CALL CENTER | Facility: HOSPITAL | Age: 87
End: 2022-07-29

## 2022-07-29 VITALS
BODY MASS INDEX: 24.33 KG/M2 | RESPIRATION RATE: 18 BRPM | HEIGHT: 67 IN | OXYGEN SATURATION: 98 % | SYSTOLIC BLOOD PRESSURE: 157 MMHG | WEIGHT: 155 LBS | TEMPERATURE: 97.6 F | DIASTOLIC BLOOD PRESSURE: 60 MMHG | HEART RATE: 56 BPM

## 2022-07-29 PROBLEM — E87.8 ELECTROLYTE DISTURBANCE: Status: ACTIVE | Noted: 2022-07-29

## 2022-07-29 PROBLEM — R06.00 DYSPNEA: Status: ACTIVE | Noted: 2022-07-29

## 2022-07-29 LAB
ACTH PLAS-MCNC: 10.1 PG/ML (ref 7.2–63.3)
BH CV REST NUCLEAR ISOTOPE DOSE: 9.8 MCI
BH CV STRESS COMMENTS STAGE 1: NORMAL
BH CV STRESS DOSE REGADENOSON STAGE 1: 0.4
BH CV STRESS DURATION MIN STAGE 1: 0
BH CV STRESS DURATION SEC STAGE 1: 10
BH CV STRESS NUCLEAR ISOTOPE DOSE: 30.3 MCI
BH CV STRESS PROTOCOL 1: NORMAL
BH CV STRESS RECOVERY BP: NORMAL MMHG
BH CV STRESS RECOVERY HR: 92 BPM
BH CV STRESS STAGE 1: 1
LV EF NUC BP: 70 %
MAXIMAL PREDICTED HEART RATE: 132 BPM
PERCENT MAX PREDICTED HR: 78.03 %
STRESS BASELINE BP: NORMAL MMHG
STRESS BASELINE HR: 64 BPM
STRESS PERCENT HR: 92 %
STRESS POST PEAK BP: NORMAL MMHG
STRESS POST PEAK HR: 103 BPM
STRESS TARGET HR: 112 BPM

## 2022-07-29 PROCEDURE — 96361 HYDRATE IV INFUSION ADD-ON: CPT

## 2022-07-29 PROCEDURE — 78452 HT MUSCLE IMAGE SPECT MULT: CPT

## 2022-07-29 PROCEDURE — A9500 TC99M SESTAMIBI: HCPCS | Performed by: EMERGENCY MEDICINE

## 2022-07-29 PROCEDURE — 93246 EXT ECG>7D<15D RECORDING: CPT

## 2022-07-29 PROCEDURE — G0378 HOSPITAL OBSERVATION PER HR: HCPCS

## 2022-07-29 PROCEDURE — 93017 CV STRESS TEST TRACING ONLY: CPT

## 2022-07-29 PROCEDURE — 25010000002 REGADENOSON 0.4 MG/5ML SOLUTION: Performed by: EMERGENCY MEDICINE

## 2022-07-29 PROCEDURE — 0 TECHNETIUM SESTAMIBI: Performed by: EMERGENCY MEDICINE

## 2022-07-29 PROCEDURE — 93016 CV STRESS TEST SUPVJ ONLY: CPT | Performed by: INTERNAL MEDICINE

## 2022-07-29 PROCEDURE — 78452 HT MUSCLE IMAGE SPECT MULT: CPT | Performed by: INTERNAL MEDICINE

## 2022-07-29 PROCEDURE — 93018 CV STRESS TEST I&R ONLY: CPT | Performed by: INTERNAL MEDICINE

## 2022-07-29 PROCEDURE — 99214 OFFICE O/P EST MOD 30 MIN: CPT | Performed by: NURSE PRACTITIONER

## 2022-07-29 RX ORDER — ACETAMINOPHEN 325 MG/1
650 TABLET ORAL EVERY 6 HOURS PRN
Status: DISCONTINUED | OUTPATIENT
Start: 2022-07-29 | End: 2022-07-29 | Stop reason: HOSPADM

## 2022-07-29 RX ADMIN — PANTOPRAZOLE SODIUM 40 MG: 40 TABLET, DELAYED RELEASE ORAL at 13:10

## 2022-07-29 RX ADMIN — AMLODIPINE BESYLATE 10 MG: 10 TABLET ORAL at 13:08

## 2022-07-29 RX ADMIN — PROPRANOLOL HYDROCHLORIDE 40 MG: 20 TABLET ORAL at 13:10

## 2022-07-29 RX ADMIN — ACETAMINOPHEN 650 MG: 325 TABLET ORAL at 14:48

## 2022-07-29 RX ADMIN — SODIUM CHLORIDE 125 ML/HR: 9 INJECTION, SOLUTION INTRAVENOUS at 05:36

## 2022-07-29 RX ADMIN — REGADENOSON 0.4 MG: 0.08 INJECTION, SOLUTION INTRAVENOUS at 08:50

## 2022-07-29 RX ADMIN — TECHNETIUM TC 99M SESTAMIBI 1 DOSE: 1 INJECTION INTRAVENOUS at 08:50

## 2022-07-29 RX ADMIN — LISINOPRIL 40 MG: 20 TABLET ORAL at 13:09

## 2022-07-29 RX ADMIN — TECHNETIUM TC 99M SESTAMIBI 1 DOSE: 1 INJECTION INTRAVENOUS at 06:28

## 2022-07-31 LAB
BACTERIA UR CULT: NORMAL
BACTERIA UR CULT: NORMAL

## 2022-08-01 ENCOUNTER — TRANSITIONAL CARE MANAGEMENT TELEPHONE ENCOUNTER (OUTPATIENT)
Dept: CALL CENTER | Facility: HOSPITAL | Age: 87
End: 2022-08-01

## 2022-08-04 ENCOUNTER — OFFICE VISIT (OUTPATIENT)
Dept: FAMILY MEDICINE CLINIC | Facility: CLINIC | Age: 87
End: 2022-08-04

## 2022-08-04 VITALS
TEMPERATURE: 98.4 F | DIASTOLIC BLOOD PRESSURE: 66 MMHG | OXYGEN SATURATION: 97 % | SYSTOLIC BLOOD PRESSURE: 125 MMHG | HEIGHT: 67 IN | HEART RATE: 59 BPM | RESPIRATION RATE: 15 BRPM | WEIGHT: 159.2 LBS | BODY MASS INDEX: 24.99 KG/M2

## 2022-08-04 DIAGNOSIS — R79.89 ELEVATED TSH: ICD-10-CM

## 2022-08-04 DIAGNOSIS — D35.02 ADRENAL ADENOMA, LEFT: ICD-10-CM

## 2022-08-04 DIAGNOSIS — R53.83 FATIGUE, UNSPECIFIED TYPE: ICD-10-CM

## 2022-08-04 DIAGNOSIS — E87.5 HYPERKALEMIA: ICD-10-CM

## 2022-08-04 DIAGNOSIS — R91.1 LUNG NODULE: Primary | ICD-10-CM

## 2022-08-04 DIAGNOSIS — K44.9 HIATAL HERNIA: ICD-10-CM

## 2022-08-04 DIAGNOSIS — E87.1 HYPONATREMIA: ICD-10-CM

## 2022-08-04 PROCEDURE — 99214 OFFICE O/P EST MOD 30 MIN: CPT | Performed by: FAMILY MEDICINE

## 2022-08-05 ENCOUNTER — TELEPHONE (OUTPATIENT)
Dept: FAMILY MEDICINE CLINIC | Facility: CLINIC | Age: 87
End: 2022-08-05

## 2022-08-05 DIAGNOSIS — E83.52 HYPERCALCEMIA: ICD-10-CM

## 2022-08-05 DIAGNOSIS — I10 BENIGN ESSENTIAL HYPERTENSION: ICD-10-CM

## 2022-08-05 DIAGNOSIS — E87.5 HYPERKALEMIA: Primary | ICD-10-CM

## 2022-08-05 LAB
ALBUMIN SERPL-MCNC: 4.8 G/DL (ref 3.6–4.6)
ALBUMIN/GLOB SERPL: 1.8 {RATIO} (ref 1.2–2.2)
ALP SERPL-CCNC: 39 IU/L (ref 44–121)
ALT SERPL-CCNC: 13 IU/L (ref 0–32)
AST SERPL-CCNC: 17 IU/L (ref 0–40)
BASOPHILS # BLD AUTO: 0.1 X10E3/UL (ref 0–0.2)
BASOPHILS NFR BLD AUTO: 1 %
BILIRUB SERPL-MCNC: 0.2 MG/DL (ref 0–1.2)
BUN SERPL-MCNC: 19 MG/DL (ref 8–27)
BUN/CREAT SERPL: 20 (ref 12–28)
CALCIUM SERPL-MCNC: 10.5 MG/DL (ref 8.7–10.3)
CHLORIDE SERPL-SCNC: 98 MMOL/L (ref 96–106)
CO2 SERPL-SCNC: 26 MMOL/L (ref 20–29)
CREAT SERPL-MCNC: 0.95 MG/DL (ref 0.57–1)
EGFRCR SERPLBLD CKD-EPI 2021: 58 ML/MIN/1.73
EOSINOPHIL # BLD AUTO: 0.2 X10E3/UL (ref 0–0.4)
EOSINOPHIL NFR BLD AUTO: 2 %
ERYTHROCYTE [DISTWIDTH] IN BLOOD BY AUTOMATED COUNT: 12.5 % (ref 11.7–15.4)
GLOBULIN SER CALC-MCNC: 2.7 G/DL (ref 1.5–4.5)
GLUCOSE SERPL-MCNC: 101 MG/DL (ref 65–99)
HCT VFR BLD AUTO: 38.6 % (ref 34–46.6)
HGB BLD-MCNC: 12.7 G/DL (ref 11.1–15.9)
IMM GRANULOCYTES # BLD AUTO: 0 X10E3/UL (ref 0–0.1)
IMM GRANULOCYTES NFR BLD AUTO: 0 %
LYMPHOCYTES # BLD AUTO: 2.9 X10E3/UL (ref 0.7–3.1)
LYMPHOCYTES NFR BLD AUTO: 28 %
MCH RBC QN AUTO: 29.6 PG (ref 26.6–33)
MCHC RBC AUTO-ENTMCNC: 32.9 G/DL (ref 31.5–35.7)
MCV RBC AUTO: 90 FL (ref 79–97)
MONOCYTES # BLD AUTO: 0.8 X10E3/UL (ref 0.1–0.9)
MONOCYTES NFR BLD AUTO: 8 %
NEUTROPHILS # BLD AUTO: 6.3 X10E3/UL (ref 1.4–7)
NEUTROPHILS NFR BLD AUTO: 61 %
PLATELET # BLD AUTO: 292 X10E3/UL (ref 150–450)
POTASSIUM SERPL-SCNC: 5.3 MMOL/L (ref 3.5–5.2)
PROT SERPL-MCNC: 7.5 G/DL (ref 6–8.5)
RBC # BLD AUTO: 4.29 X10E6/UL (ref 3.77–5.28)
SODIUM SERPL-SCNC: 139 MMOL/L (ref 134–144)
T3FREE SERPL-MCNC: 2.6 PG/ML (ref 2–4.4)
T4 FREE SERPL-MCNC: 1.45 NG/DL (ref 0.82–1.77)
TSH SERPL DL<=0.005 MIU/L-ACNC: 3.02 UIU/ML (ref 0.45–4.5)
VIT B12 SERPL-MCNC: 464 PG/ML (ref 232–1245)
WBC # BLD AUTO: 10.3 X10E3/UL (ref 3.4–10.8)

## 2022-08-05 RX ORDER — LISINOPRIL 30 MG/1
30 TABLET ORAL DAILY
Qty: 90 TABLET | Refills: 3 | Status: SHIPPED | OUTPATIENT
Start: 2022-08-05 | End: 2022-08-17 | Stop reason: SDUPTHER

## 2022-08-17 ENCOUNTER — OFFICE VISIT (OUTPATIENT)
Dept: CARDIOLOGY | Facility: CLINIC | Age: 87
End: 2022-08-17

## 2022-08-17 VITALS
HEIGHT: 67 IN | OXYGEN SATURATION: 97 % | BODY MASS INDEX: 24.99 KG/M2 | HEART RATE: 56 BPM | DIASTOLIC BLOOD PRESSURE: 60 MMHG | WEIGHT: 159.2 LBS | SYSTOLIC BLOOD PRESSURE: 136 MMHG

## 2022-08-17 DIAGNOSIS — R53.83 FATIGUE, UNSPECIFIED TYPE: ICD-10-CM

## 2022-08-17 DIAGNOSIS — I10 PRIMARY HYPERTENSION: Primary | ICD-10-CM

## 2022-08-17 DIAGNOSIS — I10 BENIGN ESSENTIAL HYPERTENSION: ICD-10-CM

## 2022-08-17 LAB
MAXIMAL PREDICTED HEART RATE: 132 BPM
STRESS TARGET HR: 112 BPM

## 2022-08-17 PROCEDURE — 93000 ELECTROCARDIOGRAM COMPLETE: CPT | Performed by: NURSE PRACTITIONER

## 2022-08-17 PROCEDURE — 93248 EXT ECG>7D<15D REV&INTERPJ: CPT | Performed by: INTERNAL MEDICINE

## 2022-08-17 PROCEDURE — 99214 OFFICE O/P EST MOD 30 MIN: CPT | Performed by: NURSE PRACTITIONER

## 2022-08-17 RX ORDER — PROPRANOLOL HYDROCHLORIDE 20 MG/1
20 TABLET ORAL 2 TIMES DAILY
Qty: 60 TABLET | Refills: 3 | Status: SHIPPED | OUTPATIENT
Start: 2022-08-17 | End: 2022-12-22 | Stop reason: SDUPTHER

## 2022-08-17 RX ORDER — LISINOPRIL 40 MG/1
40 TABLET ORAL DAILY
Qty: 90 TABLET | Refills: 3 | Status: SHIPPED | OUTPATIENT
Start: 2022-08-17 | End: 2022-08-19 | Stop reason: SDUPTHER

## 2022-08-19 DIAGNOSIS — I10 BENIGN ESSENTIAL HYPERTENSION: ICD-10-CM

## 2022-08-19 DIAGNOSIS — E87.5 HYPERKALEMIA: Primary | ICD-10-CM

## 2022-08-19 RX ORDER — LISINOPRIL 20 MG/1
20 TABLET ORAL DAILY
Qty: 30 TABLET | Refills: 0 | Status: SHIPPED | OUTPATIENT
Start: 2022-08-19 | End: 2022-09-13

## 2022-08-24 ENCOUNTER — TELEPHONE (OUTPATIENT)
Dept: CARDIOLOGY | Facility: CLINIC | Age: 87
End: 2022-08-24

## 2022-08-25 ENCOUNTER — TELEPHONE (OUTPATIENT)
Dept: FAMILY MEDICINE CLINIC | Facility: CLINIC | Age: 87
End: 2022-08-25

## 2022-09-01 ENCOUNTER — OFFICE VISIT (OUTPATIENT)
Dept: FAMILY MEDICINE CLINIC | Facility: CLINIC | Age: 87
End: 2022-09-01

## 2022-09-01 VITALS
WEIGHT: 159 LBS | SYSTOLIC BLOOD PRESSURE: 128 MMHG | OXYGEN SATURATION: 97 % | BODY MASS INDEX: 24.96 KG/M2 | DIASTOLIC BLOOD PRESSURE: 72 MMHG | HEIGHT: 67 IN | TEMPERATURE: 98.2 F | HEART RATE: 52 BPM | RESPIRATION RATE: 14 BRPM

## 2022-09-01 DIAGNOSIS — I10 PRIMARY HYPERTENSION: Primary | ICD-10-CM

## 2022-09-01 DIAGNOSIS — E78.00 HIGH CHOLESTEROL: ICD-10-CM

## 2022-09-01 DIAGNOSIS — Z00.00 WELLNESS EXAMINATION: ICD-10-CM

## 2022-09-01 DIAGNOSIS — E87.5 HYPERKALEMIA: ICD-10-CM

## 2022-09-01 PROCEDURE — 0051A COVID-19 (PFIZER) 12+ YRS: CPT | Performed by: FAMILY MEDICINE

## 2022-09-01 PROCEDURE — 91305 COVID-19 (PFIZER) 12+ YRS: CPT | Performed by: FAMILY MEDICINE

## 2022-09-01 PROCEDURE — 99213 OFFICE O/P EST LOW 20 MIN: CPT | Performed by: FAMILY MEDICINE

## 2022-09-06 ENCOUNTER — INFUSION (OUTPATIENT)
Dept: ONCOLOGY | Facility: HOSPITAL | Age: 87
End: 2022-09-06

## 2022-09-06 ENCOUNTER — LAB (OUTPATIENT)
Dept: OTHER | Facility: HOSPITAL | Age: 87
End: 2022-09-06

## 2022-09-06 VITALS — RESPIRATION RATE: 18 BRPM | BODY MASS INDEX: 24.9 KG/M2 | TEMPERATURE: 97.3 F | HEIGHT: 67 IN

## 2022-09-06 DIAGNOSIS — M81.0 AGE-RELATED OSTEOPOROSIS WITHOUT CURRENT PATHOLOGICAL FRACTURE: Primary | ICD-10-CM

## 2022-09-06 PROCEDURE — 25010000002 DENOSUMAB 60 MG/ML SOLUTION PREFILLED SYRINGE: Performed by: FAMILY MEDICINE

## 2022-09-06 PROCEDURE — 96372 THER/PROPH/DIAG INJ SC/IM: CPT

## 2022-09-06 RX ADMIN — DENOSUMAB 60 MG: 60 INJECTION SUBCUTANEOUS at 12:41

## 2022-09-07 DIAGNOSIS — E78.00 HIGH CHOLESTEROL: ICD-10-CM

## 2022-09-08 LAB
CHOLEST SERPL-MCNC: 196 MG/DL (ref 0–200)
HDLC SERPL-MCNC: 61 MG/DL (ref 40–60)
LDLC SERPL CALC-MCNC: 123 MG/DL (ref 0–100)
TRIGL SERPL-MCNC: 63 MG/DL (ref 0–150)
VLDLC SERPL CALC-MCNC: 12 MG/DL (ref 5–40)

## 2022-09-13 DIAGNOSIS — I10 BENIGN ESSENTIAL HYPERTENSION: ICD-10-CM

## 2022-09-13 RX ORDER — LISINOPRIL 20 MG/1
20 TABLET ORAL DAILY
Qty: 30 TABLET | Refills: 0 | Status: SHIPPED | OUTPATIENT
Start: 2022-09-13 | End: 2022-10-05

## 2022-10-05 DIAGNOSIS — M54.16 LUMBAR RADICULOPATHY: ICD-10-CM

## 2022-10-05 DIAGNOSIS — I10 BENIGN ESSENTIAL HYPERTENSION: ICD-10-CM

## 2022-10-05 RX ORDER — LISINOPRIL 20 MG/1
20 TABLET ORAL DAILY
Qty: 30 TABLET | Refills: 0 | Status: SHIPPED | OUTPATIENT
Start: 2022-10-05 | End: 2022-11-02

## 2022-10-05 RX ORDER — MELOXICAM 15 MG/1
15 TABLET ORAL DAILY PRN
Qty: 90 TABLET | Refills: 0 | Status: SHIPPED | OUTPATIENT
Start: 2022-10-05 | End: 2023-01-02

## 2022-10-17 ENCOUNTER — HOSPITAL ENCOUNTER (OUTPATIENT)
Dept: CT IMAGING | Facility: HOSPITAL | Age: 87
Discharge: HOME | End: 2022-10-17
Admitting: INTERNAL MEDICINE

## 2022-10-17 DIAGNOSIS — R91.1 PULMONARY NODULE: ICD-10-CM

## 2022-10-17 PROCEDURE — 71250 CT THORAX DX C-: CPT

## 2022-10-26 ENCOUNTER — TRANSCRIBE ORDERS (OUTPATIENT)
Dept: ADMINISTRATIVE | Facility: HOSPITAL | Age: 87
End: 2022-10-26

## 2022-10-26 DIAGNOSIS — R91.8 PULMONARY NODULES: Primary | ICD-10-CM

## 2022-11-02 DIAGNOSIS — I10 BENIGN ESSENTIAL HYPERTENSION: ICD-10-CM

## 2022-11-02 RX ORDER — LISINOPRIL 20 MG/1
20 TABLET ORAL DAILY
Qty: 30 TABLET | Refills: 0 | Status: SHIPPED | OUTPATIENT
Start: 2022-11-02 | End: 2022-12-07

## 2022-12-07 DIAGNOSIS — I10 BENIGN ESSENTIAL HYPERTENSION: ICD-10-CM

## 2022-12-07 RX ORDER — LISINOPRIL 20 MG/1
20 TABLET ORAL DAILY
Qty: 30 TABLET | Refills: 3 | Status: SHIPPED | OUTPATIENT
Start: 2022-12-07 | End: 2023-03-01 | Stop reason: SDUPTHER

## 2022-12-22 DIAGNOSIS — I10 BENIGN ESSENTIAL HYPERTENSION: ICD-10-CM

## 2022-12-22 RX ORDER — PROPRANOLOL HYDROCHLORIDE 20 MG/1
20 TABLET ORAL 2 TIMES DAILY
Qty: 60 TABLET | Refills: 3 | Status: SHIPPED | OUTPATIENT
Start: 2022-12-22 | End: 2022-12-28

## 2022-12-27 DIAGNOSIS — I10 BENIGN ESSENTIAL HYPERTENSION: ICD-10-CM

## 2022-12-28 RX ORDER — PROPRANOLOL HYDROCHLORIDE 40 MG/1
40 TABLET ORAL 2 TIMES DAILY
Qty: 60 TABLET | Refills: 2 | Status: SHIPPED | OUTPATIENT
Start: 2022-12-28 | End: 2023-03-01 | Stop reason: SDUPTHER

## 2023-01-02 DIAGNOSIS — M54.16 LUMBAR RADICULOPATHY: ICD-10-CM

## 2023-01-02 RX ORDER — MELOXICAM 15 MG/1
15 TABLET ORAL DAILY PRN
Qty: 90 TABLET | Refills: 0 | Status: SHIPPED | OUTPATIENT
Start: 2023-01-02 | End: 2023-04-04

## 2023-01-19 DIAGNOSIS — I10 BENIGN ESSENTIAL HYPERTENSION: ICD-10-CM

## 2023-01-20 RX ORDER — AMLODIPINE BESYLATE 10 MG/1
10 TABLET ORAL DAILY
Qty: 90 TABLET | Refills: 0 | Status: SHIPPED | OUTPATIENT
Start: 2023-01-20

## 2023-03-01 ENCOUNTER — OFFICE VISIT (OUTPATIENT)
Dept: FAMILY MEDICINE CLINIC | Facility: CLINIC | Age: 88
End: 2023-03-01
Payer: MEDICARE

## 2023-03-01 VITALS
HEIGHT: 67 IN | OXYGEN SATURATION: 99 % | WEIGHT: 166.8 LBS | DIASTOLIC BLOOD PRESSURE: 76 MMHG | HEART RATE: 62 BPM | TEMPERATURE: 97.7 F | BODY MASS INDEX: 26.18 KG/M2 | SYSTOLIC BLOOD PRESSURE: 130 MMHG

## 2023-03-01 DIAGNOSIS — R29.898 WEAKNESS OF BOTH LOWER EXTREMITIES: ICD-10-CM

## 2023-03-01 DIAGNOSIS — M48.062 SPINAL STENOSIS, LUMBAR REGION, WITH NEUROGENIC CLAUDICATION: ICD-10-CM

## 2023-03-01 DIAGNOSIS — N32.81 OVERACTIVE BLADDER: ICD-10-CM

## 2023-03-01 DIAGNOSIS — R91.1 LUNG NODULE: ICD-10-CM

## 2023-03-01 DIAGNOSIS — R53.1 GENERALIZED WEAKNESS: ICD-10-CM

## 2023-03-01 DIAGNOSIS — Z00.00 MEDICARE ANNUAL WELLNESS VISIT, SUBSEQUENT: Primary | ICD-10-CM

## 2023-03-01 DIAGNOSIS — I10 BENIGN ESSENTIAL HYPERTENSION: ICD-10-CM

## 2023-03-01 DIAGNOSIS — E55.9 VITAMIN D DEFICIENCY: ICD-10-CM

## 2023-03-01 DIAGNOSIS — R53.83 FATIGUE, UNSPECIFIED TYPE: ICD-10-CM

## 2023-03-01 DIAGNOSIS — M81.0 AGE-RELATED OSTEOPOROSIS WITHOUT CURRENT PATHOLOGICAL FRACTURE: ICD-10-CM

## 2023-03-01 PROCEDURE — 1159F MED LIST DOCD IN RCRD: CPT | Performed by: FAMILY MEDICINE

## 2023-03-01 PROCEDURE — 99214 OFFICE O/P EST MOD 30 MIN: CPT | Performed by: FAMILY MEDICINE

## 2023-03-01 PROCEDURE — G0439 PPPS, SUBSEQ VISIT: HCPCS | Performed by: FAMILY MEDICINE

## 2023-03-01 PROCEDURE — 1170F FXNL STATUS ASSESSED: CPT | Performed by: FAMILY MEDICINE

## 2023-03-01 RX ORDER — LISINOPRIL 20 MG/1
20 TABLET ORAL DAILY
Qty: 90 TABLET | Refills: 3 | Status: SHIPPED | OUTPATIENT
Start: 2023-03-01

## 2023-03-01 RX ORDER — OXYBUTYNIN CHLORIDE 5 MG/1
5 TABLET, EXTENDED RELEASE ORAL DAILY
Qty: 30 TABLET | Refills: 0 | Status: SHIPPED | OUTPATIENT
Start: 2023-03-01 | End: 2023-03-06

## 2023-03-01 RX ORDER — PROPRANOLOL HYDROCHLORIDE 40 MG/1
40 TABLET ORAL 2 TIMES DAILY
Qty: 180 TABLET | Refills: 2 | Status: SHIPPED | OUTPATIENT
Start: 2023-03-01

## 2023-03-02 LAB
25(OH)D3+25(OH)D2 SERPL-MCNC: 55.3 NG/ML (ref 30–100)
ALBUMIN SERPL-MCNC: 4.9 G/DL (ref 3.5–5.2)
ALBUMIN/GLOB SERPL: 2.2 G/DL
ALP SERPL-CCNC: 37 U/L (ref 39–117)
ALT SERPL-CCNC: 10 U/L (ref 1–33)
AST SERPL-CCNC: 16 U/L (ref 1–32)
BASOPHILS # BLD AUTO: 0.04 10*3/MM3 (ref 0–0.2)
BASOPHILS NFR BLD AUTO: 0.5 % (ref 0–1.5)
BILIRUB SERPL-MCNC: 0.4 MG/DL (ref 0–1.2)
BUN SERPL-MCNC: 27 MG/DL (ref 8–23)
BUN/CREAT SERPL: 28.4 (ref 7–25)
CALCIUM SERPL-MCNC: 9.8 MG/DL (ref 8.6–10.5)
CHLORIDE SERPL-SCNC: 103 MMOL/L (ref 98–107)
CHOLEST SERPL-MCNC: 210 MG/DL (ref 0–200)
CO2 SERPL-SCNC: 28.2 MMOL/L (ref 22–29)
CREAT SERPL-MCNC: 0.95 MG/DL (ref 0.57–1)
EGFRCR SERPLBLD CKD-EPI 2021: 57.4 ML/MIN/1.73
EOSINOPHIL # BLD AUTO: 0.21 10*3/MM3 (ref 0–0.4)
EOSINOPHIL NFR BLD AUTO: 2.5 % (ref 0.3–6.2)
ERYTHROCYTE [DISTWIDTH] IN BLOOD BY AUTOMATED COUNT: 12.1 % (ref 12.3–15.4)
GLOBULIN SER CALC-MCNC: 2.2 GM/DL
GLUCOSE SERPL-MCNC: 107 MG/DL (ref 65–99)
HCT VFR BLD AUTO: 37 % (ref 34–46.6)
HDLC SERPL-MCNC: 60 MG/DL (ref 40–60)
HGB BLD-MCNC: 12.4 G/DL (ref 12–15.9)
IMM GRANULOCYTES # BLD AUTO: 0.03 10*3/MM3 (ref 0–0.05)
IMM GRANULOCYTES NFR BLD AUTO: 0.4 % (ref 0–0.5)
LDLC SERPL CALC-MCNC: 130 MG/DL (ref 0–100)
LYMPHOCYTES # BLD AUTO: 2.08 10*3/MM3 (ref 0.7–3.1)
LYMPHOCYTES NFR BLD AUTO: 24.9 % (ref 19.6–45.3)
MCH RBC QN AUTO: 29.9 PG (ref 26.6–33)
MCHC RBC AUTO-ENTMCNC: 33.5 G/DL (ref 31.5–35.7)
MCV RBC AUTO: 89.2 FL (ref 79–97)
MONOCYTES # BLD AUTO: 0.56 10*3/MM3 (ref 0.1–0.9)
MONOCYTES NFR BLD AUTO: 6.7 % (ref 5–12)
NEUTROPHILS # BLD AUTO: 5.42 10*3/MM3 (ref 1.7–7)
NEUTROPHILS NFR BLD AUTO: 65 % (ref 42.7–76)
NRBC BLD AUTO-RTO: 0 /100 WBC (ref 0–0.2)
PLATELET # BLD AUTO: 236 10*3/MM3 (ref 140–450)
POTASSIUM SERPL-SCNC: 5 MMOL/L (ref 3.5–5.2)
PROT SERPL-MCNC: 7.1 G/DL (ref 6–8.5)
RBC # BLD AUTO: 4.15 10*6/MM3 (ref 3.77–5.28)
SODIUM SERPL-SCNC: 141 MMOL/L (ref 136–145)
TRIGL SERPL-MCNC: 115 MG/DL (ref 0–150)
TSH SERPL DL<=0.005 MIU/L-ACNC: 2.89 UIU/ML (ref 0.27–4.2)
VIT B12 SERPL-MCNC: 342 PG/ML (ref 211–946)
VLDLC SERPL CALC-MCNC: 20 MG/DL (ref 5–40)
WBC # BLD AUTO: 8.34 10*3/MM3 (ref 3.4–10.8)

## 2023-03-03 ENCOUNTER — TELEPHONE (OUTPATIENT)
Dept: FAMILY MEDICINE CLINIC | Facility: CLINIC | Age: 88
End: 2023-03-03
Payer: MEDICARE

## 2023-03-06 DIAGNOSIS — N32.81 OVERACTIVE BLADDER: ICD-10-CM

## 2023-03-06 RX ORDER — OXYBUTYNIN CHLORIDE 5 MG/1
5 TABLET, EXTENDED RELEASE ORAL DAILY
Qty: 90 TABLET | Refills: 3 | Status: SHIPPED | OUTPATIENT
Start: 2023-03-06

## 2023-03-08 ENCOUNTER — INFUSION (OUTPATIENT)
Dept: ONCOLOGY | Facility: HOSPITAL | Age: 88
End: 2023-03-08
Payer: MEDICARE

## 2023-03-08 VITALS — RESPIRATION RATE: 18 BRPM | HEIGHT: 67 IN | TEMPERATURE: 97.2 F | BODY MASS INDEX: 26.12 KG/M2

## 2023-03-08 DIAGNOSIS — M81.0 AGE-RELATED OSTEOPOROSIS WITHOUT CURRENT PATHOLOGICAL FRACTURE: Primary | ICD-10-CM

## 2023-03-08 DIAGNOSIS — M81.0 OSTEOPOROSIS, UNSPECIFIED OSTEOPOROSIS TYPE, UNSPECIFIED PATHOLOGICAL FRACTURE PRESENCE: Primary | ICD-10-CM

## 2023-03-08 DIAGNOSIS — M81.0 OSTEOPOROSIS, UNSPECIFIED OSTEOPOROSIS TYPE, UNSPECIFIED PATHOLOGICAL FRACTURE PRESENCE: ICD-10-CM

## 2023-03-08 PROCEDURE — 96372 THER/PROPH/DIAG INJ SC/IM: CPT

## 2023-03-08 PROCEDURE — 25010000002 DENOSUMAB 60 MG/ML SOLUTION PREFILLED SYRINGE: Performed by: FAMILY MEDICINE

## 2023-03-08 RX ADMIN — DENOSUMAB 60 MG: 60 INJECTION SUBCUTANEOUS at 13:56

## 2023-04-04 DIAGNOSIS — M54.16 LUMBAR RADICULOPATHY: ICD-10-CM

## 2023-04-04 RX ORDER — MELOXICAM 15 MG/1
15 TABLET ORAL DAILY PRN
Qty: 90 TABLET | Refills: 0 | Status: SHIPPED | OUTPATIENT
Start: 2023-04-04

## 2023-04-21 DIAGNOSIS — I10 BENIGN ESSENTIAL HYPERTENSION: ICD-10-CM

## 2023-04-21 RX ORDER — AMLODIPINE BESYLATE 10 MG/1
10 TABLET ORAL DAILY
Qty: 90 TABLET | Refills: 0 | Status: SHIPPED | OUTPATIENT
Start: 2023-04-21

## 2023-04-25 ENCOUNTER — HOSPITAL ENCOUNTER (OUTPATIENT)
Dept: CT IMAGING | Facility: HOSPITAL | Age: 88
Discharge: HOME | End: 2023-04-25
Admitting: INTERNAL MEDICINE
Payer: MEDICARE

## 2023-04-25 DIAGNOSIS — R91.8 PULMONARY NODULES: ICD-10-CM

## 2023-04-25 PROCEDURE — 71250 CT THORAX DX C-: CPT

## 2023-05-03 ENCOUNTER — TRANSCRIBE ORDERS (OUTPATIENT)
Dept: ADMINISTRATIVE | Facility: HOSPITAL | Age: 88
End: 2023-05-03
Payer: MEDICARE

## 2023-05-03 DIAGNOSIS — R91.1 PULMONARY NODULE: Primary | ICD-10-CM

## 2023-06-30 ENCOUNTER — TELEPHONE (OUTPATIENT)
Dept: FAMILY MEDICINE CLINIC | Facility: CLINIC | Age: 88
End: 2023-06-30

## 2023-07-03 PROBLEM — G44.019 EPISODIC CLUSTER HEADACHE, NOT INTRACTABLE: Status: ACTIVE | Noted: 2023-07-03

## 2023-07-03 PROBLEM — M65.331 TRIGGER MIDDLE FINGER OF RIGHT HAND: Status: ACTIVE | Noted: 2023-07-03

## 2023-07-24 DIAGNOSIS — I10 BENIGN ESSENTIAL HYPERTENSION: ICD-10-CM

## 2023-07-24 RX ORDER — AMLODIPINE BESYLATE 10 MG/1
10 TABLET ORAL DAILY
Qty: 90 TABLET | Refills: 0 | Status: SHIPPED | OUTPATIENT
Start: 2023-07-24

## 2023-09-11 ENCOUNTER — OFFICE VISIT (OUTPATIENT)
Dept: FAMILY MEDICINE CLINIC | Facility: CLINIC | Age: 88
End: 2023-09-11
Payer: MEDICARE

## 2023-09-11 VITALS
HEART RATE: 50 BPM | HEIGHT: 67 IN | WEIGHT: 162 LBS | OXYGEN SATURATION: 100 % | SYSTOLIC BLOOD PRESSURE: 132 MMHG | TEMPERATURE: 97.6 F | BODY MASS INDEX: 25.43 KG/M2 | DIASTOLIC BLOOD PRESSURE: 82 MMHG

## 2023-09-11 DIAGNOSIS — Z79.899 HIGH RISK MEDICATION USE: ICD-10-CM

## 2023-09-11 DIAGNOSIS — N32.81 OVERACTIVE BLADDER: ICD-10-CM

## 2023-09-11 DIAGNOSIS — M48.062 SPINAL STENOSIS, LUMBAR REGION, WITH NEUROGENIC CLAUDICATION: ICD-10-CM

## 2023-09-11 DIAGNOSIS — G44.019 EPISODIC CLUSTER HEADACHE, NOT INTRACTABLE: ICD-10-CM

## 2023-09-11 DIAGNOSIS — M19.90 ARTHRITIS: ICD-10-CM

## 2023-09-11 DIAGNOSIS — M48.062 LUMBAR STENOSIS WITH NEUROGENIC CLAUDICATION: ICD-10-CM

## 2023-09-11 DIAGNOSIS — I10 BENIGN ESSENTIAL HYPERTENSION: Primary | ICD-10-CM

## 2023-09-11 PROCEDURE — 1160F RVW MEDS BY RX/DR IN RCRD: CPT | Performed by: FAMILY MEDICINE

## 2023-09-11 PROCEDURE — 99214 OFFICE O/P EST MOD 30 MIN: CPT | Performed by: FAMILY MEDICINE

## 2023-09-11 PROCEDURE — 1159F MED LIST DOCD IN RCRD: CPT | Performed by: FAMILY MEDICINE

## 2023-09-11 RX ORDER — AMLODIPINE BESYLATE 5 MG/1
5 TABLET ORAL DAILY
Qty: 90 TABLET | Refills: 3 | Status: SHIPPED | OUTPATIENT
Start: 2023-09-11

## 2023-09-12 ENCOUNTER — DOCUMENTATION (OUTPATIENT)
Dept: ONCOLOGY | Facility: HOSPITAL | Age: 88
End: 2023-09-12
Payer: MEDICARE

## 2023-09-12 LAB
ALBUMIN SERPL-MCNC: 4.5 G/DL (ref 3.5–5.2)
ALBUMIN/GLOB SERPL: 2.1 G/DL
ALP SERPL-CCNC: 38 U/L (ref 39–117)
ALT SERPL-CCNC: 13 U/L (ref 1–33)
AST SERPL-CCNC: 19 U/L (ref 1–32)
BILIRUB SERPL-MCNC: 0.4 MG/DL (ref 0–1.2)
BUN SERPL-MCNC: 26 MG/DL (ref 8–23)
BUN/CREAT SERPL: 23.2 (ref 7–25)
CALCIUM SERPL-MCNC: 10 MG/DL (ref 8.6–10.5)
CARBAMAZEPINE SERPL-MCNC: <2 MCG/ML (ref 4–12)
CHLORIDE SERPL-SCNC: 102 MMOL/L (ref 98–107)
CO2 SERPL-SCNC: 24 MMOL/L (ref 22–29)
CREAT SERPL-MCNC: 1.12 MG/DL (ref 0.57–1)
EGFRCR SERPLBLD CKD-EPI 2021: 47.1 ML/MIN/1.73
GLOBULIN SER CALC-MCNC: 2.1 GM/DL
GLUCOSE SERPL-MCNC: 101 MG/DL (ref 65–99)
POTASSIUM SERPL-SCNC: 5.2 MMOL/L (ref 3.5–5.2)
PROT SERPL-MCNC: 6.6 G/DL (ref 6–8.5)
SODIUM SERPL-SCNC: 137 MMOL/L (ref 136–145)

## 2023-09-13 ENCOUNTER — INFUSION (OUTPATIENT)
Dept: ONCOLOGY | Facility: HOSPITAL | Age: 88
End: 2023-09-13
Payer: MEDICARE

## 2023-09-13 VITALS — HEIGHT: 67 IN | TEMPERATURE: 97.9 F | RESPIRATION RATE: 15 BRPM | BODY MASS INDEX: 25.37 KG/M2

## 2023-09-13 DIAGNOSIS — M81.0 OSTEOPOROSIS, UNSPECIFIED OSTEOPOROSIS TYPE, UNSPECIFIED PATHOLOGICAL FRACTURE PRESENCE: Primary | ICD-10-CM

## 2023-09-13 PROCEDURE — 96372 THER/PROPH/DIAG INJ SC/IM: CPT

## 2023-09-13 PROCEDURE — 25010000002 DENOSUMAB 60 MG/ML SOLUTION PREFILLED SYRINGE: Performed by: FAMILY MEDICINE

## 2023-09-13 RX ADMIN — DENOSUMAB 60 MG: 60 INJECTION SUBCUTANEOUS at 10:11

## 2023-09-22 DIAGNOSIS — M54.16 LUMBAR RADICULOPATHY: ICD-10-CM

## 2023-09-25 RX ORDER — MELOXICAM 15 MG/1
15 TABLET ORAL DAILY PRN
Qty: 90 TABLET | Refills: 0 | Status: SHIPPED | OUTPATIENT
Start: 2023-09-25

## 2023-10-06 ENCOUNTER — TELEPHONE (OUTPATIENT)
Dept: FAMILY MEDICINE CLINIC | Facility: CLINIC | Age: 88
End: 2023-10-06
Payer: MEDICARE

## 2023-11-07 ENCOUNTER — HOSPITAL ENCOUNTER (OUTPATIENT)
Dept: CT IMAGING | Facility: HOSPITAL | Age: 88
Discharge: HOME | End: 2023-11-07
Admitting: INTERNAL MEDICINE
Payer: MEDICARE

## 2023-11-07 DIAGNOSIS — R91.1 PULMONARY NODULE: ICD-10-CM

## 2023-11-07 PROCEDURE — 71250 CT THORAX DX C-: CPT

## 2023-11-15 ENCOUNTER — TRANSCRIBE ORDERS (OUTPATIENT)
Dept: ADMINISTRATIVE | Facility: HOSPITAL | Age: 88
End: 2023-11-15
Payer: MEDICARE

## 2023-11-15 DIAGNOSIS — R91.1 LUNG NODULE: Primary | ICD-10-CM

## 2023-12-29 DIAGNOSIS — Z78.0 POST-MENOPAUSAL: ICD-10-CM

## 2023-12-29 DIAGNOSIS — M81.0 OSTEOPOROSIS, UNSPECIFIED OSTEOPOROSIS TYPE, UNSPECIFIED PATHOLOGICAL FRACTURE PRESENCE: Primary | ICD-10-CM

## 2024-01-02 DIAGNOSIS — M54.16 LUMBAR RADICULOPATHY: ICD-10-CM

## 2024-01-03 RX ORDER — MELOXICAM 15 MG/1
15 TABLET ORAL DAILY PRN
Qty: 90 TABLET | Refills: 0 | Status: SHIPPED | OUTPATIENT
Start: 2024-01-03

## 2024-01-08 DIAGNOSIS — I10 BENIGN ESSENTIAL HYPERTENSION: ICD-10-CM

## 2024-01-08 RX ORDER — AMLODIPINE BESYLATE 5 MG/1
5 TABLET ORAL DAILY
Qty: 90 TABLET | Refills: 3 | Status: SHIPPED | OUTPATIENT
Start: 2024-01-08

## 2024-01-12 ENCOUNTER — TELEPHONE (OUTPATIENT)
Dept: FAMILY MEDICINE CLINIC | Facility: CLINIC | Age: 89
End: 2024-01-12

## 2024-01-12 DIAGNOSIS — I10 BENIGN ESSENTIAL HYPERTENSION: ICD-10-CM

## 2024-01-12 RX ORDER — AMLODIPINE BESYLATE 5 MG/1
5 TABLET ORAL 2 TIMES DAILY
Qty: 180 TABLET | Refills: 3 | Status: CANCELLED | OUTPATIENT
Start: 2024-01-12

## 2024-01-24 DIAGNOSIS — I10 BENIGN ESSENTIAL HYPERTENSION: ICD-10-CM

## 2024-01-24 RX ORDER — AMLODIPINE BESYLATE 5 MG/1
7.5 TABLET ORAL DAILY
Qty: 135 TABLET | Refills: 3 | Status: SHIPPED | OUTPATIENT
Start: 2024-01-24

## 2024-01-24 RX ORDER — AMLODIPINE BESYLATE 5 MG/1
7.5 TABLET ORAL DAILY
Qty: 135 TABLET | Refills: 3 | Status: SHIPPED | OUTPATIENT
Start: 2024-01-24 | End: 2024-01-24 | Stop reason: SDUPTHER

## 2024-02-20 DIAGNOSIS — I10 BENIGN ESSENTIAL HYPERTENSION: ICD-10-CM

## 2024-02-20 RX ORDER — LISINOPRIL 20 MG/1
20 TABLET ORAL DAILY
Qty: 90 TABLET | Refills: 3 | Status: SHIPPED | OUTPATIENT
Start: 2024-02-20

## 2024-03-18 ENCOUNTER — OFFICE VISIT (OUTPATIENT)
Dept: FAMILY MEDICINE CLINIC | Facility: CLINIC | Age: 89
End: 2024-03-18
Payer: MEDICARE

## 2024-03-18 VITALS
WEIGHT: 161.8 LBS | HEIGHT: 67 IN | OXYGEN SATURATION: 95 % | BODY MASS INDEX: 25.39 KG/M2 | DIASTOLIC BLOOD PRESSURE: 70 MMHG | HEART RATE: 52 BPM | TEMPERATURE: 97.3 F | SYSTOLIC BLOOD PRESSURE: 170 MMHG

## 2024-03-18 DIAGNOSIS — I95.2 HYPOTENSION DUE TO DRUGS: ICD-10-CM

## 2024-03-18 DIAGNOSIS — M54.16 LUMBAR RADICULOPATHY: ICD-10-CM

## 2024-03-18 DIAGNOSIS — I10 BENIGN ESSENTIAL HYPERTENSION: ICD-10-CM

## 2024-03-18 DIAGNOSIS — N32.81 OVERACTIVE BLADDER: ICD-10-CM

## 2024-03-18 DIAGNOSIS — Z23 IMMUNIZATION DUE: ICD-10-CM

## 2024-03-18 DIAGNOSIS — M48.062 SPINAL STENOSIS, LUMBAR REGION, WITH NEUROGENIC CLAUDICATION: ICD-10-CM

## 2024-03-18 DIAGNOSIS — G44.019 EPISODIC CLUSTER HEADACHE, NOT INTRACTABLE: ICD-10-CM

## 2024-03-18 DIAGNOSIS — Z00.00 MEDICARE ANNUAL WELLNESS VISIT, SUBSEQUENT: Primary | ICD-10-CM

## 2024-03-18 DIAGNOSIS — M81.0 AGE-RELATED OSTEOPOROSIS WITHOUT CURRENT PATHOLOGICAL FRACTURE: ICD-10-CM

## 2024-03-19 LAB
ALBUMIN SERPL-MCNC: 4.8 G/DL (ref 3.6–4.6)
ALBUMIN/GLOB SERPL: 2.1 {RATIO} (ref 1.2–2.2)
ALP SERPL-CCNC: 40 IU/L (ref 44–121)
ALT SERPL-CCNC: 12 IU/L (ref 0–32)
AST SERPL-CCNC: 17 IU/L (ref 0–40)
BILIRUB SERPL-MCNC: 0.4 MG/DL (ref 0–1.2)
BUN SERPL-MCNC: 20 MG/DL (ref 10–36)
BUN/CREAT SERPL: 21 (ref 12–28)
CALCIUM SERPL-MCNC: 9.8 MG/DL (ref 8.7–10.3)
CHLORIDE SERPL-SCNC: 101 MMOL/L (ref 96–106)
CHOLEST SERPL-MCNC: 224 MG/DL (ref 100–199)
CO2 SERPL-SCNC: 26 MMOL/L (ref 20–29)
CREAT SERPL-MCNC: 0.96 MG/DL (ref 0.57–1)
EGFRCR SERPLBLD CKD-EPI 2021: 56 ML/MIN/1.73
GLOBULIN SER CALC-MCNC: 2.3 G/DL (ref 1.5–4.5)
GLUCOSE SERPL-MCNC: 100 MG/DL (ref 70–99)
HDLC SERPL-MCNC: 67 MG/DL
LDLC SERPL CALC-MCNC: 142 MG/DL (ref 0–99)
POTASSIUM SERPL-SCNC: 5.2 MMOL/L (ref 3.5–5.2)
PROT SERPL-MCNC: 7.1 G/DL (ref 6–8.5)
SODIUM SERPL-SCNC: 140 MMOL/L (ref 134–144)
TRIGL SERPL-MCNC: 87 MG/DL (ref 0–149)
VLDLC SERPL CALC-MCNC: 15 MG/DL (ref 5–40)

## 2024-03-21 ENCOUNTER — DOCUMENTATION (OUTPATIENT)
Dept: ONCOLOGY | Facility: HOSPITAL | Age: 89
End: 2024-03-21
Payer: MEDICARE

## 2024-03-22 DIAGNOSIS — M81.0 AGE-RELATED OSTEOPOROSIS WITHOUT CURRENT PATHOLOGICAL FRACTURE: Primary | ICD-10-CM

## 2024-03-26 ENCOUNTER — INFUSION (OUTPATIENT)
Dept: ONCOLOGY | Facility: HOSPITAL | Age: 89
End: 2024-03-26
Payer: MEDICARE

## 2024-03-26 VITALS — RESPIRATION RATE: 18 BRPM | TEMPERATURE: 97.8 F

## 2024-03-26 DIAGNOSIS — M81.0 AGE-RELATED OSTEOPOROSIS WITHOUT CURRENT PATHOLOGICAL FRACTURE: Primary | ICD-10-CM

## 2024-03-26 PROCEDURE — 25010000002 DENOSUMAB 60 MG/ML SOLUTION PREFILLED SYRINGE: Performed by: FAMILY MEDICINE

## 2024-03-26 PROCEDURE — 96372 THER/PROPH/DIAG INJ SC/IM: CPT

## 2024-03-26 RX ADMIN — DENOSUMAB 60 MG: 60 INJECTION SUBCUTANEOUS at 10:07

## 2024-03-29 DIAGNOSIS — M54.16 LUMBAR RADICULOPATHY: ICD-10-CM

## 2024-03-29 RX ORDER — MELOXICAM 15 MG/1
15 TABLET ORAL DAILY PRN
Qty: 90 TABLET | Refills: 0 | Status: SHIPPED | OUTPATIENT
Start: 2024-03-29

## 2024-04-04 DIAGNOSIS — I10 BENIGN ESSENTIAL HYPERTENSION: ICD-10-CM

## 2024-04-04 RX ORDER — PROPRANOLOL HYDROCHLORIDE 40 MG/1
40 TABLET ORAL 2 TIMES DAILY
Qty: 180 TABLET | Refills: 0 | Status: SHIPPED | OUTPATIENT
Start: 2024-04-04

## 2024-06-07 ENCOUNTER — HOSPITAL ENCOUNTER (OUTPATIENT)
Dept: BONE DENSITY | Facility: HOSPITAL | Age: 89
Discharge: HOME | End: 2024-06-07
Payer: MEDICARE

## 2024-06-07 DIAGNOSIS — M81.0 OSTEOPOROSIS, UNSPECIFIED OSTEOPOROSIS TYPE, UNSPECIFIED PATHOLOGICAL FRACTURE PRESENCE: ICD-10-CM

## 2024-06-07 DIAGNOSIS — Z78.0 POST-MENOPAUSAL: ICD-10-CM

## 2024-06-07 PROCEDURE — 77080 DXA BONE DENSITY AXIAL: CPT

## 2024-06-24 DIAGNOSIS — M54.16 LUMBAR RADICULOPATHY: ICD-10-CM

## 2024-06-26 ENCOUNTER — HOSPITAL ENCOUNTER (OUTPATIENT)
Dept: CT IMAGING | Facility: HOSPITAL | Age: 89
Discharge: HOME | End: 2024-06-26
Admitting: INTERNAL MEDICINE
Payer: MEDICARE

## 2024-06-26 DIAGNOSIS — R91.1 LUNG NODULE: ICD-10-CM

## 2024-06-26 PROCEDURE — 71250 CT THORAX DX C-: CPT

## 2024-06-26 RX ORDER — MELOXICAM 15 MG/1
15 TABLET ORAL DAILY PRN
Qty: 90 TABLET | Refills: 0 | Status: SHIPPED | OUTPATIENT
Start: 2024-06-26

## 2024-07-05 DIAGNOSIS — I10 BENIGN ESSENTIAL HYPERTENSION: ICD-10-CM

## 2024-07-05 DIAGNOSIS — M54.16 LUMBAR RADICULOPATHY: ICD-10-CM

## 2024-07-05 RX ORDER — MELOXICAM 15 MG/1
15 TABLET ORAL DAILY PRN
Qty: 90 TABLET | Refills: 0 | OUTPATIENT
Start: 2024-07-05

## 2024-07-05 RX ORDER — PROPRANOLOL HYDROCHLORIDE 40 MG/1
40 TABLET ORAL 2 TIMES DAILY
Qty: 180 TABLET | Refills: 0 | Status: SHIPPED | OUTPATIENT
Start: 2024-07-05

## 2024-08-14 ENCOUNTER — TRANSCRIBE ORDERS (OUTPATIENT)
Dept: ADMINISTRATIVE | Facility: HOSPITAL | Age: 89
End: 2024-08-14
Payer: MEDICARE

## 2024-08-14 DIAGNOSIS — R91.8 LUNG NODULES: Primary | ICD-10-CM

## 2024-08-29 ENCOUNTER — HOSPITAL ENCOUNTER (OUTPATIENT)
Facility: HOSPITAL | Age: 89
Discharge: HOME | End: 2024-08-29
Attending: EMERGENCY MEDICINE | Admitting: EMERGENCY MEDICINE
Payer: MEDICARE

## 2024-08-29 ENCOUNTER — APPOINTMENT (OUTPATIENT)
Dept: GENERAL RADIOLOGY | Facility: HOSPITAL | Age: 89
End: 2024-08-29
Payer: MEDICARE

## 2024-08-29 VITALS
BODY MASS INDEX: 24.33 KG/M2 | HEART RATE: 76 BPM | OXYGEN SATURATION: 97 % | HEIGHT: 67 IN | WEIGHT: 155 LBS | SYSTOLIC BLOOD PRESSURE: 193 MMHG | DIASTOLIC BLOOD PRESSURE: 86 MMHG | RESPIRATION RATE: 18 BRPM | TEMPERATURE: 98.7 F

## 2024-08-29 DIAGNOSIS — R05.1 ACUTE COUGH: ICD-10-CM

## 2024-08-29 DIAGNOSIS — J06.9 UPPER RESPIRATORY TRACT INFECTION, UNSPECIFIED TYPE: Primary | ICD-10-CM

## 2024-08-29 LAB
FLUAV SUBTYP SPEC NAA+PROBE: NOT DETECTED
FLUBV RNA ISLT QL NAA+PROBE: NOT DETECTED
SARS-COV-2 RNA RESP QL NAA+PROBE: NOT DETECTED
STREP A PCR: NOT DETECTED

## 2024-08-29 PROCEDURE — 99203 OFFICE O/P NEW LOW 30 MIN: CPT | Performed by: EMERGENCY MEDICINE

## 2024-08-29 PROCEDURE — 71045 X-RAY EXAM CHEST 1 VIEW: CPT

## 2024-08-29 PROCEDURE — 87651 STREP A DNA AMP PROBE: CPT

## 2024-08-29 PROCEDURE — 87636 SARSCOV2 & INF A&B AMP PRB: CPT

## 2024-08-29 PROCEDURE — G0463 HOSPITAL OUTPT CLINIC VISIT: HCPCS | Performed by: EMERGENCY MEDICINE

## 2024-09-16 ENCOUNTER — OFFICE VISIT (OUTPATIENT)
Dept: FAMILY MEDICINE CLINIC | Facility: CLINIC | Age: 89
End: 2024-09-16
Payer: MEDICARE

## 2024-09-16 VITALS
WEIGHT: 156.2 LBS | SYSTOLIC BLOOD PRESSURE: 110 MMHG | HEART RATE: 54 BPM | OXYGEN SATURATION: 97 % | DIASTOLIC BLOOD PRESSURE: 60 MMHG | TEMPERATURE: 96.9 F | BODY MASS INDEX: 24.46 KG/M2

## 2024-09-16 DIAGNOSIS — M54.16 LUMBAR RADICULOPATHY: ICD-10-CM

## 2024-09-16 DIAGNOSIS — N32.81 OVERACTIVE BLADDER: ICD-10-CM

## 2024-09-16 DIAGNOSIS — G44.019 EPISODIC CLUSTER HEADACHE, NOT INTRACTABLE: ICD-10-CM

## 2024-09-16 DIAGNOSIS — M48.062 SPINAL STENOSIS, LUMBAR REGION, WITH NEUROGENIC CLAUDICATION: ICD-10-CM

## 2024-09-16 DIAGNOSIS — Z23 IMMUNIZATION DUE: ICD-10-CM

## 2024-09-16 DIAGNOSIS — I10 BENIGN ESSENTIAL HYPERTENSION: Primary | ICD-10-CM

## 2024-09-16 PROCEDURE — 1126F AMNT PAIN NOTED NONE PRSNT: CPT | Performed by: FAMILY MEDICINE

## 2024-09-16 PROCEDURE — 99214 OFFICE O/P EST MOD 30 MIN: CPT | Performed by: FAMILY MEDICINE

## 2024-09-16 PROCEDURE — 1160F RVW MEDS BY RX/DR IN RCRD: CPT | Performed by: FAMILY MEDICINE

## 2024-09-16 PROCEDURE — G2211 COMPLEX E/M VISIT ADD ON: HCPCS | Performed by: FAMILY MEDICINE

## 2024-09-16 PROCEDURE — 1159F MED LIST DOCD IN RCRD: CPT | Performed by: FAMILY MEDICINE

## 2024-09-16 RX ORDER — PROPRANOLOL HYDROCHLORIDE 40 MG/1
40 TABLET ORAL 2 TIMES DAILY
Qty: 180 TABLET | Refills: 1 | Status: SHIPPED | OUTPATIENT
Start: 2024-09-16

## 2024-09-16 RX ORDER — LISINOPRIL 20 MG/1
20 TABLET ORAL DAILY
Qty: 90 TABLET | Refills: 3 | Status: SHIPPED | OUTPATIENT
Start: 2024-09-16

## 2024-09-16 RX ORDER — AMLODIPINE BESYLATE 5 MG/1
7.5 TABLET ORAL DAILY
Qty: 135 TABLET | Refills: 3 | Status: SHIPPED | OUTPATIENT
Start: 2024-09-16

## 2024-09-17 LAB
ALBUMIN SERPL-MCNC: 4.3 G/DL (ref 3.5–5.2)
ALBUMIN/GLOB SERPL: 1.9 G/DL
ALP SERPL-CCNC: 34 U/L (ref 39–117)
ALT SERPL-CCNC: 18 U/L (ref 1–33)
AST SERPL-CCNC: 21 U/L (ref 1–32)
BASOPHILS # BLD AUTO: 0.05 10*3/MM3 (ref 0–0.2)
BASOPHILS NFR BLD AUTO: 0.5 % (ref 0–1.5)
BILIRUB SERPL-MCNC: 0.3 MG/DL (ref 0–1.2)
BUN SERPL-MCNC: 17 MG/DL (ref 8–23)
BUN/CREAT SERPL: 19.3 (ref 7–25)
CALCIUM SERPL-MCNC: 10.1 MG/DL (ref 8.2–9.6)
CHLORIDE SERPL-SCNC: 99 MMOL/L (ref 98–107)
CO2 SERPL-SCNC: 28.7 MMOL/L (ref 22–29)
CREAT SERPL-MCNC: 0.88 MG/DL (ref 0.57–1)
EGFRCR SERPLBLD CKD-EPI 2021: 62.1 ML/MIN/1.73
EOSINOPHIL # BLD AUTO: 0.13 10*3/MM3 (ref 0–0.4)
EOSINOPHIL NFR BLD AUTO: 1.4 % (ref 0.3–6.2)
ERYTHROCYTE [DISTWIDTH] IN BLOOD BY AUTOMATED COUNT: 12.1 % (ref 12.3–15.4)
GLOBULIN SER CALC-MCNC: 2.3 GM/DL
GLUCOSE SERPL-MCNC: 99 MG/DL (ref 65–99)
HCT VFR BLD AUTO: 36.8 % (ref 34–46.6)
HGB BLD-MCNC: 12.2 G/DL (ref 12–15.9)
IMM GRANULOCYTES # BLD AUTO: 0.03 10*3/MM3 (ref 0–0.05)
IMM GRANULOCYTES NFR BLD AUTO: 0.3 % (ref 0–0.5)
LYMPHOCYTES # BLD AUTO: 2.29 10*3/MM3 (ref 0.7–3.1)
LYMPHOCYTES NFR BLD AUTO: 24.3 % (ref 19.6–45.3)
MCH RBC QN AUTO: 29.8 PG (ref 26.6–33)
MCHC RBC AUTO-ENTMCNC: 33.2 G/DL (ref 31.5–35.7)
MCV RBC AUTO: 89.8 FL (ref 79–97)
MONOCYTES # BLD AUTO: 0.54 10*3/MM3 (ref 0.1–0.9)
MONOCYTES NFR BLD AUTO: 5.7 % (ref 5–12)
NEUTROPHILS # BLD AUTO: 6.39 10*3/MM3 (ref 1.7–7)
NEUTROPHILS NFR BLD AUTO: 67.8 % (ref 42.7–76)
NRBC BLD AUTO-RTO: 0 /100 WBC (ref 0–0.2)
PLATELET # BLD AUTO: 275 10*3/MM3 (ref 140–450)
POTASSIUM SERPL-SCNC: 4.7 MMOL/L (ref 3.5–5.2)
PROT SERPL-MCNC: 6.6 G/DL (ref 6–8.5)
RBC # BLD AUTO: 4.1 10*6/MM3 (ref 3.77–5.28)
SODIUM SERPL-SCNC: 134 MMOL/L (ref 136–145)
WBC # BLD AUTO: 9.43 10*3/MM3 (ref 3.4–10.8)

## 2024-09-18 DIAGNOSIS — E87.1 LOW SODIUM LEVELS: ICD-10-CM

## 2024-09-18 DIAGNOSIS — E83.52 HIGH CALCIUM LEVELS: ICD-10-CM

## 2024-09-18 DIAGNOSIS — E55.9 VITAMIN D DEFICIENCY: Primary | ICD-10-CM

## 2024-09-27 DIAGNOSIS — I10 BENIGN ESSENTIAL HYPERTENSION: ICD-10-CM

## 2024-09-27 RX ORDER — AMLODIPINE BESYLATE 5 MG/1
5 TABLET ORAL DAILY
Qty: 90 TABLET | OUTPATIENT
Start: 2024-09-27

## 2024-09-30 ENCOUNTER — DOCUMENTATION (OUTPATIENT)
Dept: ONCOLOGY | Facility: HOSPITAL | Age: 89
End: 2024-09-30
Payer: MEDICARE

## 2024-10-01 ENCOUNTER — INFUSION (OUTPATIENT)
Dept: ONCOLOGY | Facility: HOSPITAL | Age: 89
End: 2024-10-01
Payer: MEDICARE

## 2024-10-01 DIAGNOSIS — M81.0 AGE-RELATED OSTEOPOROSIS WITHOUT CURRENT PATHOLOGICAL FRACTURE: Primary | ICD-10-CM

## 2024-10-01 PROCEDURE — 96372 THER/PROPH/DIAG INJ SC/IM: CPT

## 2024-10-01 PROCEDURE — 25010000002 DENOSUMAB 60 MG/ML SOLUTION PREFILLED SYRINGE: Performed by: FAMILY MEDICINE

## 2024-10-01 RX ADMIN — DENOSUMAB 60 MG: 60 INJECTION SUBCUTANEOUS at 12:04

## 2024-10-02 DIAGNOSIS — I10 BENIGN ESSENTIAL HYPERTENSION: ICD-10-CM

## 2024-10-02 RX ORDER — PROPRANOLOL HYDROCHLORIDE 40 MG/1
40 TABLET ORAL 2 TIMES DAILY
Qty: 180 TABLET | Refills: 1 | OUTPATIENT
Start: 2024-10-02

## 2024-10-20 ENCOUNTER — HOSPITAL ENCOUNTER (INPATIENT)
Facility: HOSPITAL | Age: 89
LOS: 3 days | Discharge: HOME | End: 2024-10-23
Attending: EMERGENCY MEDICINE | Admitting: INTERNAL MEDICINE
Payer: MEDICARE

## 2024-10-20 ENCOUNTER — ANESTHESIA (OUTPATIENT)
Dept: PERIOP | Facility: HOSPITAL | Age: 89
End: 2024-10-20
Payer: MEDICARE

## 2024-10-20 ENCOUNTER — APPOINTMENT (OUTPATIENT)
Dept: GENERAL RADIOLOGY | Facility: HOSPITAL | Age: 89
End: 2024-10-20
Payer: MEDICARE

## 2024-10-20 ENCOUNTER — APPOINTMENT (OUTPATIENT)
Dept: CT IMAGING | Facility: HOSPITAL | Age: 89
End: 2024-10-20
Payer: MEDICARE

## 2024-10-20 ENCOUNTER — ANESTHESIA EVENT (OUTPATIENT)
Dept: PERIOP | Facility: HOSPITAL | Age: 89
End: 2024-10-20
Payer: MEDICARE

## 2024-10-20 DIAGNOSIS — N20.0 NEPHROLITHIASIS: ICD-10-CM

## 2024-10-20 DIAGNOSIS — N39.0 ACUTE UTI: Primary | ICD-10-CM

## 2024-10-20 DIAGNOSIS — D72.829 LEUKOCYTOSIS, UNSPECIFIED TYPE: ICD-10-CM

## 2024-10-20 PROBLEM — N10 ACUTE PYELONEPHRITIS: Status: ACTIVE | Noted: 2024-10-20

## 2024-10-20 PROBLEM — R93.5 ABNORMAL CT OF THE ABDOMEN: Status: ACTIVE | Noted: 2024-10-20

## 2024-10-20 PROBLEM — A41.9 SEPSIS SECONDARY TO UTI: Status: ACTIVE | Noted: 2024-10-20

## 2024-10-20 PROBLEM — N20.1 LEFT URETERAL STONE: Status: ACTIVE | Noted: 2024-10-20

## 2024-10-20 LAB
ALBUMIN SERPL-MCNC: 3.6 G/DL (ref 3.5–5.2)
ALBUMIN/GLOB SERPL: 1.2 G/DL
ALP SERPL-CCNC: 43 U/L (ref 39–117)
ALT SERPL W P-5'-P-CCNC: 11 U/L (ref 1–33)
ANION GAP SERPL CALCULATED.3IONS-SCNC: 13 MMOL/L (ref 5–15)
AST SERPL-CCNC: 17 U/L (ref 1–32)
BACTERIA UR QL AUTO: ABNORMAL /HPF
BASOPHILS # BLD AUTO: 0.04 10*3/MM3 (ref 0–0.2)
BASOPHILS NFR BLD AUTO: 0.2 % (ref 0–1.5)
BILIRUB SERPL-MCNC: 0.5 MG/DL (ref 0–1.2)
BILIRUB UR QL STRIP: NEGATIVE
BUN SERPL-MCNC: 29 MG/DL (ref 8–23)
BUN/CREAT SERPL: 24.4 (ref 7–25)
CALCIUM SPEC-SCNC: 9.4 MG/DL (ref 8.2–9.6)
CHLORIDE SERPL-SCNC: 96 MMOL/L (ref 98–107)
CLARITY UR: ABNORMAL
CO2 SERPL-SCNC: 23 MMOL/L (ref 22–29)
COLOR UR: YELLOW
CREAT SERPL-MCNC: 1.19 MG/DL (ref 0.57–1)
D-LACTATE SERPL-SCNC: 0.9 MMOL/L (ref 0.5–2)
DEPRECATED RDW RBC AUTO: 41.4 FL (ref 37–54)
EGFRCR SERPLBLD CKD-EPI 2021: 43.3 ML/MIN/1.73
EOSINOPHIL # BLD AUTO: 0.01 10*3/MM3 (ref 0–0.4)
EOSINOPHIL NFR BLD AUTO: 0.1 % (ref 0.3–6.2)
ERYTHROCYTE [DISTWIDTH] IN BLOOD BY AUTOMATED COUNT: 12.7 % (ref 12.3–15.4)
GLOBULIN UR ELPH-MCNC: 3.1 GM/DL
GLUCOSE SERPL-MCNC: 110 MG/DL (ref 65–99)
GLUCOSE UR STRIP-MCNC: NEGATIVE MG/DL
HCT VFR BLD AUTO: 33.8 % (ref 34–46.6)
HGB BLD-MCNC: 11.2 G/DL (ref 12–15.9)
HGB UR QL STRIP.AUTO: ABNORMAL
HOLD SPECIMEN: NORMAL
HOLD SPECIMEN: NORMAL
HYALINE CASTS UR QL AUTO: ABNORMAL /LPF
IMM GRANULOCYTES # BLD AUTO: 0.1 10*3/MM3 (ref 0–0.05)
IMM GRANULOCYTES NFR BLD AUTO: 0.5 % (ref 0–0.5)
KETONES UR QL STRIP: ABNORMAL
LEUKOCYTE ESTERASE UR QL STRIP.AUTO: ABNORMAL
LIPASE SERPL-CCNC: 31 U/L (ref 13–60)
LYMPHOCYTES # BLD AUTO: 1.26 10*3/MM3 (ref 0.7–3.1)
LYMPHOCYTES NFR BLD AUTO: 6.5 % (ref 19.6–45.3)
MCH RBC QN AUTO: 29.9 PG (ref 26.6–33)
MCHC RBC AUTO-ENTMCNC: 33.1 G/DL (ref 31.5–35.7)
MCV RBC AUTO: 90.4 FL (ref 79–97)
MONOCYTES # BLD AUTO: 1.04 10*3/MM3 (ref 0.1–0.9)
MONOCYTES NFR BLD AUTO: 5.3 % (ref 5–12)
NEUTROPHILS NFR BLD AUTO: 17.02 10*3/MM3 (ref 1.7–7)
NEUTROPHILS NFR BLD AUTO: 87.4 % (ref 42.7–76)
NITRITE UR QL STRIP: POSITIVE
NRBC BLD AUTO-RTO: 0 /100 WBC (ref 0–0.2)
PH UR STRIP.AUTO: 6 [PH] (ref 5–8)
PLATELET # BLD AUTO: 226 10*3/MM3 (ref 140–450)
PMV BLD AUTO: 9.5 FL (ref 6–12)
POTASSIUM SERPL-SCNC: 4.2 MMOL/L (ref 3.5–5.2)
PROCALCITONIN SERPL-MCNC: 0.51 NG/ML (ref 0–0.25)
PROT SERPL-MCNC: 6.7 G/DL (ref 6–8.5)
PROT UR QL STRIP: ABNORMAL
RBC # BLD AUTO: 3.74 10*6/MM3 (ref 3.77–5.28)
RBC # UR STRIP: ABNORMAL /HPF
REF LAB TEST METHOD: ABNORMAL
SODIUM SERPL-SCNC: 132 MMOL/L (ref 136–145)
SP GR UR STRIP: 1.02 (ref 1–1.03)
SQUAMOUS #/AREA URNS HPF: ABNORMAL /HPF
UROBILINOGEN UR QL STRIP: ABNORMAL
WBC # UR STRIP: ABNORMAL /HPF
WBC NRBC COR # BLD AUTO: 19.47 10*3/MM3 (ref 3.4–10.8)
WHOLE BLOOD HOLD COAG: NORMAL
WHOLE BLOOD HOLD SPECIMEN: NORMAL

## 2024-10-20 PROCEDURE — 81001 URINALYSIS AUTO W/SCOPE: CPT | Performed by: EMERGENCY MEDICINE

## 2024-10-20 PROCEDURE — 76000 FLUOROSCOPY <1 HR PHYS/QHP: CPT

## 2024-10-20 PROCEDURE — 99285 EMERGENCY DEPT VISIT HI MDM: CPT

## 2024-10-20 PROCEDURE — 36415 COLL VENOUS BLD VENIPUNCTURE: CPT

## 2024-10-20 PROCEDURE — 87186 SC STD MICRODIL/AGAR DIL: CPT | Performed by: UROLOGY

## 2024-10-20 PROCEDURE — 25810000003 LACTATED RINGERS PER 1000 ML: Performed by: ANESTHESIOLOGY

## 2024-10-20 PROCEDURE — 25010000002 CEFTRIAXONE PER 250 MG: Performed by: INTERNAL MEDICINE

## 2024-10-20 PROCEDURE — C1758 CATHETER, URETERAL: HCPCS | Performed by: UROLOGY

## 2024-10-20 PROCEDURE — 87086 URINE CULTURE/COLONY COUNT: CPT | Performed by: UROLOGY

## 2024-10-20 PROCEDURE — 85025 COMPLETE CBC W/AUTO DIFF WBC: CPT

## 2024-10-20 PROCEDURE — 25010000002 LIDOCAINE 2% SOLUTION: Performed by: NURSE ANESTHETIST, CERTIFIED REGISTERED

## 2024-10-20 PROCEDURE — 87077 CULTURE AEROBIC IDENTIFY: CPT | Performed by: UROLOGY

## 2024-10-20 PROCEDURE — 25810000003 SODIUM CHLORIDE 0.9 % SOLUTION: Performed by: INTERNAL MEDICINE

## 2024-10-20 PROCEDURE — C1769 GUIDE WIRE: HCPCS | Performed by: UROLOGY

## 2024-10-20 PROCEDURE — 25510000001 IOPAMIDOL 61 % SOLUTION: Performed by: EMERGENCY MEDICINE

## 2024-10-20 PROCEDURE — C2617 STENT, NON-COR, TEM W/O DEL: HCPCS | Performed by: UROLOGY

## 2024-10-20 PROCEDURE — 25010000002 PIPERACILLIN SOD-TAZOBACTAM PER 1 G: Performed by: EMERGENCY MEDICINE

## 2024-10-20 PROCEDURE — 25810000003 SODIUM CHLORIDE 0.9 % SOLUTION: Performed by: EMERGENCY MEDICINE

## 2024-10-20 PROCEDURE — 0T778DZ DILATION OF LEFT URETER WITH INTRALUMINAL DEVICE, VIA NATURAL OR ARTIFICIAL OPENING ENDOSCOPIC: ICD-10-PCS | Performed by: UROLOGY

## 2024-10-20 PROCEDURE — 74177 CT ABD & PELVIS W/CONTRAST: CPT

## 2024-10-20 PROCEDURE — 25010000002 PROPOFOL 10 MG/ML EMULSION: Performed by: NURSE ANESTHETIST, CERTIFIED REGISTERED

## 2024-10-20 PROCEDURE — 80053 COMPREHEN METABOLIC PANEL: CPT

## 2024-10-20 PROCEDURE — 87040 BLOOD CULTURE FOR BACTERIA: CPT | Performed by: EMERGENCY MEDICINE

## 2024-10-20 PROCEDURE — 83605 ASSAY OF LACTIC ACID: CPT

## 2024-10-20 PROCEDURE — 25010000002 DEXAMETHASONE PER 1 MG: Performed by: NURSE ANESTHETIST, CERTIFIED REGISTERED

## 2024-10-20 PROCEDURE — 83690 ASSAY OF LIPASE: CPT

## 2024-10-20 PROCEDURE — 25010000002 ONDANSETRON PER 1 MG: Performed by: INTERNAL MEDICINE

## 2024-10-20 PROCEDURE — 84145 PROCALCITONIN (PCT): CPT | Performed by: EMERGENCY MEDICINE

## 2024-10-20 DEVICE — URETERAL STENT
Type: IMPLANTABLE DEVICE | Site: URETER | Status: FUNCTIONAL
Brand: CONTOUR™

## 2024-10-20 RX ORDER — FAMOTIDINE 10 MG/ML
20 INJECTION, SOLUTION INTRAVENOUS ONCE
Status: COMPLETED | OUTPATIENT
Start: 2024-10-20 | End: 2024-10-20

## 2024-10-20 RX ORDER — ONDANSETRON 4 MG/1
4 TABLET, ORALLY DISINTEGRATING ORAL EVERY 6 HOURS PRN
Status: DISCONTINUED | OUTPATIENT
Start: 2024-10-20 | End: 2024-10-23 | Stop reason: HOSPADM

## 2024-10-20 RX ORDER — NITROGLYCERIN 0.4 MG/1
0.4 TABLET SUBLINGUAL
Status: DISCONTINUED | OUTPATIENT
Start: 2024-10-20 | End: 2024-10-23 | Stop reason: HOSPADM

## 2024-10-20 RX ORDER — SODIUM CHLORIDE 0.9 % (FLUSH) 0.9 %
10 SYRINGE (ML) INJECTION AS NEEDED
Status: DISCONTINUED | OUTPATIENT
Start: 2024-10-20 | End: 2024-10-23 | Stop reason: HOSPADM

## 2024-10-20 RX ORDER — ACETAMINOPHEN 325 MG/1
650 TABLET ORAL EVERY 4 HOURS PRN
Status: DISCONTINUED | OUTPATIENT
Start: 2024-10-20 | End: 2024-10-23 | Stop reason: HOSPADM

## 2024-10-20 RX ORDER — ACETAMINOPHEN 160 MG/5ML
650 SOLUTION ORAL EVERY 4 HOURS PRN
Status: DISCONTINUED | OUTPATIENT
Start: 2024-10-20 | End: 2024-10-23 | Stop reason: HOSPADM

## 2024-10-20 RX ORDER — POLYETHYLENE GLYCOL 3350 17 G/17G
17 POWDER, FOR SOLUTION ORAL DAILY PRN
Status: DISCONTINUED | OUTPATIENT
Start: 2024-10-20 | End: 2024-10-23 | Stop reason: HOSPADM

## 2024-10-20 RX ORDER — PROPOFOL 10 MG/ML
VIAL (ML) INTRAVENOUS AS NEEDED
Status: DISCONTINUED | OUTPATIENT
Start: 2024-10-20 | End: 2024-10-21 | Stop reason: SURG

## 2024-10-20 RX ORDER — SODIUM CHLORIDE, SODIUM LACTATE, POTASSIUM CHLORIDE, CALCIUM CHLORIDE 600; 310; 30; 20 MG/100ML; MG/100ML; MG/100ML; MG/100ML
9 INJECTION, SOLUTION INTRAVENOUS CONTINUOUS
Status: DISCONTINUED | OUTPATIENT
Start: 2024-10-20 | End: 2024-10-21

## 2024-10-20 RX ORDER — BISACODYL 10 MG
10 SUPPOSITORY, RECTAL RECTAL DAILY PRN
Status: DISCONTINUED | OUTPATIENT
Start: 2024-10-20 | End: 2024-10-23 | Stop reason: HOSPADM

## 2024-10-20 RX ORDER — HYDROCODONE BITARTRATE AND ACETAMINOPHEN 7.5; 325 MG/1; MG/1
1 TABLET ORAL EVERY 4 HOURS PRN
Status: DISCONTINUED | OUTPATIENT
Start: 2024-10-20 | End: 2024-10-23 | Stop reason: HOSPADM

## 2024-10-20 RX ORDER — SODIUM CHLORIDE 9 MG/ML
125 INJECTION, SOLUTION INTRAVENOUS CONTINUOUS
Status: DISCONTINUED | OUTPATIENT
Start: 2024-10-20 | End: 2024-10-21

## 2024-10-20 RX ORDER — MORPHINE SULFATE 2 MG/ML
4 INJECTION, SOLUTION INTRAMUSCULAR; INTRAVENOUS
Status: DISCONTINUED | OUTPATIENT
Start: 2024-10-20 | End: 2024-10-23 | Stop reason: HOSPADM

## 2024-10-20 RX ORDER — DEXAMETHASONE SODIUM PHOSPHATE 4 MG/ML
INJECTION, SOLUTION INTRA-ARTICULAR; INTRALESIONAL; INTRAMUSCULAR; INTRAVENOUS; SOFT TISSUE AS NEEDED
Status: DISCONTINUED | OUTPATIENT
Start: 2024-10-20 | End: 2024-10-21 | Stop reason: SURG

## 2024-10-20 RX ORDER — LIDOCAINE HYDROCHLORIDE 20 MG/ML
INJECTION, SOLUTION INFILTRATION; PERINEURAL AS NEEDED
Status: DISCONTINUED | OUTPATIENT
Start: 2024-10-20 | End: 2024-10-21 | Stop reason: SURG

## 2024-10-20 RX ORDER — SODIUM CHLORIDE 0.9 % (FLUSH) 0.9 %
10 SYRINGE (ML) INJECTION EVERY 12 HOURS SCHEDULED
Status: DISCONTINUED | OUTPATIENT
Start: 2024-10-20 | End: 2024-10-23 | Stop reason: HOSPADM

## 2024-10-20 RX ORDER — IOPAMIDOL 612 MG/ML
100 INJECTION, SOLUTION INTRAVASCULAR
Status: COMPLETED | OUTPATIENT
Start: 2024-10-20 | End: 2024-10-20

## 2024-10-20 RX ORDER — SODIUM CHLORIDE 9 MG/ML
125 INJECTION, SOLUTION INTRAVENOUS CONTINUOUS
Status: ACTIVE | OUTPATIENT
Start: 2024-10-20 | End: 2024-10-20

## 2024-10-20 RX ORDER — LIDOCAINE HYDROCHLORIDE 10 MG/ML
0.5 INJECTION, SOLUTION INFILTRATION; PERINEURAL ONCE AS NEEDED
Status: DISCONTINUED | OUTPATIENT
Start: 2024-10-20 | End: 2024-10-20 | Stop reason: HOSPADM

## 2024-10-20 RX ORDER — ACETAMINOPHEN 650 MG/1
650 SUPPOSITORY RECTAL EVERY 4 HOURS PRN
Status: DISCONTINUED | OUTPATIENT
Start: 2024-10-20 | End: 2024-10-23 | Stop reason: HOSPADM

## 2024-10-20 RX ORDER — SODIUM CHLORIDE 0.9 % (FLUSH) 0.9 %
3 SYRINGE (ML) INJECTION EVERY 12 HOURS SCHEDULED
Status: DISCONTINUED | OUTPATIENT
Start: 2024-10-20 | End: 2024-10-20 | Stop reason: HOSPADM

## 2024-10-20 RX ORDER — BISACODYL 5 MG/1
5 TABLET, DELAYED RELEASE ORAL DAILY PRN
Status: DISCONTINUED | OUTPATIENT
Start: 2024-10-20 | End: 2024-10-23 | Stop reason: HOSPADM

## 2024-10-20 RX ORDER — SODIUM CHLORIDE 0.9 % (FLUSH) 0.9 %
3-10 SYRINGE (ML) INJECTION AS NEEDED
Status: DISCONTINUED | OUTPATIENT
Start: 2024-10-20 | End: 2024-10-20 | Stop reason: HOSPADM

## 2024-10-20 RX ORDER — PHENYLEPHRINE HCL IN 0.9% NACL 1 MG/10 ML
SYRINGE (ML) INTRAVENOUS AS NEEDED
Status: DISCONTINUED | OUTPATIENT
Start: 2024-10-20 | End: 2024-10-21 | Stop reason: SURG

## 2024-10-20 RX ORDER — ONDANSETRON 2 MG/ML
4 INJECTION INTRAMUSCULAR; INTRAVENOUS EVERY 6 HOURS PRN
Status: DISCONTINUED | OUTPATIENT
Start: 2024-10-20 | End: 2024-10-23 | Stop reason: HOSPADM

## 2024-10-20 RX ORDER — AMOXICILLIN 250 MG
2 CAPSULE ORAL 2 TIMES DAILY PRN
Status: DISCONTINUED | OUTPATIENT
Start: 2024-10-20 | End: 2024-10-23 | Stop reason: HOSPADM

## 2024-10-20 RX ADMIN — SODIUM CHLORIDE 125 ML/HR: 9 INJECTION, SOLUTION INTRAVENOUS at 22:19

## 2024-10-20 RX ADMIN — CEFTRIAXONE 2000 MG: 2 INJECTION, POWDER, FOR SOLUTION INTRAMUSCULAR; INTRAVENOUS at 22:19

## 2024-10-20 RX ADMIN — PROPOFOL 80 MG: 10 INJECTION, EMULSION INTRAVENOUS at 23:45

## 2024-10-20 RX ADMIN — Medication 10 ML: at 22:26

## 2024-10-20 RX ADMIN — LIDOCAINE HYDROCHLORIDE 40 MG: 20 INJECTION, SOLUTION INFILTRATION; PERINEURAL at 23:45

## 2024-10-20 RX ADMIN — SODIUM CHLORIDE 125 ML/HR: 9 INJECTION, SOLUTION INTRAVENOUS at 19:34

## 2024-10-20 RX ADMIN — SODIUM CHLORIDE, POTASSIUM CHLORIDE, SODIUM LACTATE AND CALCIUM CHLORIDE 9 ML/HR: 600; 310; 30; 20 INJECTION, SOLUTION INTRAVENOUS at 23:25

## 2024-10-20 RX ADMIN — Medication 100 MCG: at 23:59

## 2024-10-20 RX ADMIN — Medication 100 MCG: at 23:48

## 2024-10-20 RX ADMIN — SODIUM CHLORIDE 500 ML: 9 INJECTION, SOLUTION INTRAVENOUS at 19:34

## 2024-10-20 RX ADMIN — IOPAMIDOL 85 ML: 612 INJECTION, SOLUTION INTRAVENOUS at 20:13

## 2024-10-20 RX ADMIN — ONDANSETRON 4 MG: 2 INJECTION INTRAMUSCULAR; INTRAVENOUS at 23:53

## 2024-10-20 RX ADMIN — Medication 100 MCG: at 23:54

## 2024-10-20 RX ADMIN — PIPERACILLIN AND TAZOBACTAM 3.38 G: 3; .375 INJECTION, POWDER, LYOPHILIZED, FOR SOLUTION INTRAVENOUS at 20:26

## 2024-10-20 RX ADMIN — FAMOTIDINE 20 MG: 10 INJECTION INTRAVENOUS at 23:32

## 2024-10-20 RX ADMIN — DEXAMETHASONE SODIUM PHOSPHATE 6 MG: 4 INJECTION, SOLUTION INTRA-ARTICULAR; INTRALESIONAL; INTRAMUSCULAR; INTRAVENOUS; SOFT TISSUE at 23:51

## 2024-10-21 PROBLEM — N39.0 ACUTE UTI: Status: ACTIVE | Noted: 2024-10-21

## 2024-10-21 LAB
ANION GAP SERPL CALCULATED.3IONS-SCNC: 11.9 MMOL/L (ref 5–15)
BASOPHILS # BLD AUTO: 0.01 10*3/MM3 (ref 0–0.2)
BASOPHILS NFR BLD AUTO: 0.1 % (ref 0–1.5)
BUN SERPL-MCNC: 24 MG/DL (ref 8–23)
BUN/CREAT SERPL: 25 (ref 7–25)
CALCIUM SPEC-SCNC: 8.6 MG/DL (ref 8.2–9.6)
CHLORIDE SERPL-SCNC: 97 MMOL/L (ref 98–107)
CO2 SERPL-SCNC: 25.1 MMOL/L (ref 22–29)
CREAT SERPL-MCNC: 0.96 MG/DL (ref 0.57–1)
DEPRECATED RDW RBC AUTO: 39.7 FL (ref 37–54)
EGFRCR SERPLBLD CKD-EPI 2021: 56 ML/MIN/1.73
EOSINOPHIL # BLD AUTO: 0 10*3/MM3 (ref 0–0.4)
EOSINOPHIL NFR BLD AUTO: 0 % (ref 0.3–6.2)
ERYTHROCYTE [DISTWIDTH] IN BLOOD BY AUTOMATED COUNT: 12.3 % (ref 12.3–15.4)
GLUCOSE SERPL-MCNC: 120 MG/DL (ref 65–99)
HCT VFR BLD AUTO: 30.5 % (ref 34–46.6)
HGB BLD-MCNC: 10.2 G/DL (ref 12–15.9)
IMM GRANULOCYTES # BLD AUTO: 0.05 10*3/MM3 (ref 0–0.05)
IMM GRANULOCYTES NFR BLD AUTO: 0.3 % (ref 0–0.5)
LYMPHOCYTES # BLD AUTO: 0.69 10*3/MM3 (ref 0.7–3.1)
LYMPHOCYTES NFR BLD AUTO: 4.7 % (ref 19.6–45.3)
MCH RBC QN AUTO: 30.1 PG (ref 26.6–33)
MCHC RBC AUTO-ENTMCNC: 33.4 G/DL (ref 31.5–35.7)
MCV RBC AUTO: 90 FL (ref 79–97)
MONOCYTES # BLD AUTO: 0.2 10*3/MM3 (ref 0.1–0.9)
MONOCYTES NFR BLD AUTO: 1.4 % (ref 5–12)
NEUTROPHILS NFR BLD AUTO: 13.73 10*3/MM3 (ref 1.7–7)
NEUTROPHILS NFR BLD AUTO: 93.5 % (ref 42.7–76)
NRBC BLD AUTO-RTO: 0 /100 WBC (ref 0–0.2)
PLATELET # BLD AUTO: 203 10*3/MM3 (ref 140–450)
PMV BLD AUTO: 9.9 FL (ref 6–12)
POTASSIUM SERPL-SCNC: 4.2 MMOL/L (ref 3.5–5.2)
RBC # BLD AUTO: 3.39 10*6/MM3 (ref 3.77–5.28)
SODIUM SERPL-SCNC: 134 MMOL/L (ref 136–145)
WBC NRBC COR # BLD AUTO: 14.68 10*3/MM3 (ref 3.4–10.8)

## 2024-10-21 PROCEDURE — 80048 BASIC METABOLIC PNL TOTAL CA: CPT | Performed by: UROLOGY

## 2024-10-21 PROCEDURE — 25010000002 CEFTRIAXONE PER 250 MG: Performed by: UROLOGY

## 2024-10-21 PROCEDURE — 85025 COMPLETE CBC W/AUTO DIFF WBC: CPT | Performed by: UROLOGY

## 2024-10-21 PROCEDURE — 25810000003 SODIUM CHLORIDE 0.9 % SOLUTION: Performed by: INTERNAL MEDICINE

## 2024-10-21 PROCEDURE — 36415 COLL VENOUS BLD VENIPUNCTURE: CPT | Performed by: UROLOGY

## 2024-10-21 PROCEDURE — 63710000001 ONDANSETRON ODT 4 MG TABLET DISPERSIBLE: Performed by: UROLOGY

## 2024-10-21 RX ORDER — PROMETHAZINE HYDROCHLORIDE 25 MG/1
25 SUPPOSITORY RECTAL ONCE AS NEEDED
Status: DISCONTINUED | OUTPATIENT
Start: 2024-10-21 | End: 2024-10-21 | Stop reason: HOSPADM

## 2024-10-21 RX ORDER — HYDROCODONE BITARTRATE AND ACETAMINOPHEN 5; 325 MG/1; MG/1
1 TABLET ORAL ONCE AS NEEDED
Status: DISCONTINUED | OUTPATIENT
Start: 2024-10-21 | End: 2024-10-21 | Stop reason: HOSPADM

## 2024-10-21 RX ORDER — DIPHENHYDRAMINE HYDROCHLORIDE 50 MG/ML
12.5 INJECTION INTRAMUSCULAR; INTRAVENOUS
Status: DISCONTINUED | OUTPATIENT
Start: 2024-10-21 | End: 2024-10-21 | Stop reason: HOSPADM

## 2024-10-21 RX ORDER — EPHEDRINE SULFATE 50 MG/ML
5 INJECTION, SOLUTION INTRAVENOUS ONCE AS NEEDED
Status: DISCONTINUED | OUTPATIENT
Start: 2024-10-21 | End: 2024-10-21 | Stop reason: HOSPADM

## 2024-10-21 RX ORDER — PROPRANOLOL HYDROCHLORIDE 40 MG/1
40 TABLET ORAL 2 TIMES DAILY
Status: DISCONTINUED | OUTPATIENT
Start: 2024-10-21 | End: 2024-10-23 | Stop reason: HOSPADM

## 2024-10-21 RX ORDER — ONDANSETRON 2 MG/ML
4 INJECTION INTRAMUSCULAR; INTRAVENOUS ONCE AS NEEDED
Status: DISCONTINUED | OUTPATIENT
Start: 2024-10-21 | End: 2024-10-21 | Stop reason: HOSPADM

## 2024-10-21 RX ORDER — FENTANYL CITRATE 50 UG/ML
25 INJECTION, SOLUTION INTRAMUSCULAR; INTRAVENOUS
Status: DISCONTINUED | OUTPATIENT
Start: 2024-10-21 | End: 2024-10-21 | Stop reason: HOSPADM

## 2024-10-21 RX ORDER — IPRATROPIUM BROMIDE AND ALBUTEROL SULFATE 2.5; .5 MG/3ML; MG/3ML
3 SOLUTION RESPIRATORY (INHALATION) ONCE AS NEEDED
Status: DISCONTINUED | OUTPATIENT
Start: 2024-10-21 | End: 2024-10-21 | Stop reason: HOSPADM

## 2024-10-21 RX ORDER — DROPERIDOL 2.5 MG/ML
0.62 INJECTION, SOLUTION INTRAMUSCULAR; INTRAVENOUS
Status: DISCONTINUED | OUTPATIENT
Start: 2024-10-21 | End: 2024-10-21 | Stop reason: HOSPADM

## 2024-10-21 RX ORDER — NALOXONE HCL 0.4 MG/ML
0.2 VIAL (ML) INJECTION AS NEEDED
Status: DISCONTINUED | OUTPATIENT
Start: 2024-10-21 | End: 2024-10-21 | Stop reason: HOSPADM

## 2024-10-21 RX ORDER — FLUMAZENIL 0.1 MG/ML
0.2 INJECTION INTRAVENOUS AS NEEDED
Status: DISCONTINUED | OUTPATIENT
Start: 2024-10-21 | End: 2024-10-21 | Stop reason: HOSPADM

## 2024-10-21 RX ORDER — HYDROMORPHONE HYDROCHLORIDE 1 MG/ML
0.25 INJECTION, SOLUTION INTRAMUSCULAR; INTRAVENOUS; SUBCUTANEOUS
Status: DISCONTINUED | OUTPATIENT
Start: 2024-10-21 | End: 2024-10-21 | Stop reason: HOSPADM

## 2024-10-21 RX ORDER — HYDRALAZINE HYDROCHLORIDE 20 MG/ML
5 INJECTION INTRAMUSCULAR; INTRAVENOUS
Status: DISCONTINUED | OUTPATIENT
Start: 2024-10-21 | End: 2024-10-21 | Stop reason: HOSPADM

## 2024-10-21 RX ORDER — PROMETHAZINE HYDROCHLORIDE 25 MG/1
25 TABLET ORAL ONCE AS NEEDED
Status: DISCONTINUED | OUTPATIENT
Start: 2024-10-21 | End: 2024-10-21 | Stop reason: HOSPADM

## 2024-10-21 RX ORDER — HYDROCODONE BITARTRATE AND ACETAMINOPHEN 7.5; 325 MG/1; MG/1
1 TABLET ORAL EVERY 4 HOURS PRN
Status: DISCONTINUED | OUTPATIENT
Start: 2024-10-21 | End: 2024-10-21 | Stop reason: HOSPADM

## 2024-10-21 RX ORDER — MULTIPLE VITAMINS W/ MINERALS TAB 9MG-400MCG
1 TAB ORAL DAILY
Status: DISCONTINUED | OUTPATIENT
Start: 2024-10-21 | End: 2024-10-23 | Stop reason: HOSPADM

## 2024-10-21 RX ORDER — LABETALOL HYDROCHLORIDE 5 MG/ML
5 INJECTION, SOLUTION INTRAVENOUS
Status: DISCONTINUED | OUTPATIENT
Start: 2024-10-21 | End: 2024-10-21 | Stop reason: HOSPADM

## 2024-10-21 RX ADMIN — Medication 10 ML: at 20:40

## 2024-10-21 RX ADMIN — Medication 10 ML: at 09:17

## 2024-10-21 RX ADMIN — Medication 1 TABLET: at 14:06

## 2024-10-21 RX ADMIN — CEFTRIAXONE 2000 MG: 2 INJECTION, POWDER, FOR SOLUTION INTRAMUSCULAR; INTRAVENOUS at 23:28

## 2024-10-21 RX ADMIN — HYDROCODONE BITARTRATE AND ACETAMINOPHEN 1 TABLET: 7.5; 325 TABLET ORAL at 07:48

## 2024-10-21 RX ADMIN — PROPRANOLOL HYDROCHLORIDE 40 MG: 40 TABLET ORAL at 15:15

## 2024-10-21 RX ADMIN — SODIUM CHLORIDE 125 ML/HR: 9 INJECTION, SOLUTION INTRAVENOUS at 06:09

## 2024-10-21 RX ADMIN — POLYETHYLENE GLYCOL 3350 17 G: 17 POWDER, FOR SOLUTION ORAL at 12:35

## 2024-10-21 RX ADMIN — Medication 100 MCG: at 00:04

## 2024-10-21 RX ADMIN — PROPRANOLOL HYDROCHLORIDE 40 MG: 40 TABLET ORAL at 20:39

## 2024-10-21 RX ADMIN — HYDROCODONE BITARTRATE AND ACETAMINOPHEN 1 TABLET: 7.5; 325 TABLET ORAL at 20:39

## 2024-10-21 RX ADMIN — ONDANSETRON 4 MG: 4 TABLET, ORALLY DISINTEGRATING ORAL at 07:48

## 2024-10-22 LAB
ANION GAP SERPL CALCULATED.3IONS-SCNC: 9.5 MMOL/L (ref 5–15)
BUN SERPL-MCNC: 30 MG/DL (ref 8–23)
BUN/CREAT SERPL: 31.9 (ref 7–25)
CALCIUM SPEC-SCNC: 8.4 MG/DL (ref 8.2–9.6)
CHLORIDE SERPL-SCNC: 102 MMOL/L (ref 98–107)
CO2 SERPL-SCNC: 25.5 MMOL/L (ref 22–29)
CREAT SERPL-MCNC: 0.94 MG/DL (ref 0.57–1)
DEPRECATED RDW RBC AUTO: 41.3 FL (ref 37–54)
EGFRCR SERPLBLD CKD-EPI 2021: 57.4 ML/MIN/1.73
ERYTHROCYTE [DISTWIDTH] IN BLOOD BY AUTOMATED COUNT: 12.5 % (ref 12.3–15.4)
GLUCOSE SERPL-MCNC: 103 MG/DL (ref 65–99)
HCT VFR BLD AUTO: 30.5 % (ref 34–46.6)
HGB BLD-MCNC: 9.7 G/DL (ref 12–15.9)
MCH RBC QN AUTO: 28.8 PG (ref 26.6–33)
MCHC RBC AUTO-ENTMCNC: 31.8 G/DL (ref 31.5–35.7)
MCV RBC AUTO: 90.5 FL (ref 79–97)
PLATELET # BLD AUTO: 223 10*3/MM3 (ref 140–450)
PMV BLD AUTO: 10.3 FL (ref 6–12)
POTASSIUM SERPL-SCNC: 4.1 MMOL/L (ref 3.5–5.2)
RBC # BLD AUTO: 3.37 10*6/MM3 (ref 3.77–5.28)
SODIUM SERPL-SCNC: 137 MMOL/L (ref 136–145)
WBC NRBC COR # BLD AUTO: 13.79 10*3/MM3 (ref 3.4–10.8)

## 2024-10-22 PROCEDURE — 80048 BASIC METABOLIC PNL TOTAL CA: CPT | Performed by: INTERNAL MEDICINE

## 2024-10-22 PROCEDURE — 85027 COMPLETE CBC AUTOMATED: CPT | Performed by: INTERNAL MEDICINE

## 2024-10-22 RX ORDER — LISINOPRIL 20 MG/1
20 TABLET ORAL
Status: DISCONTINUED | OUTPATIENT
Start: 2024-10-22 | End: 2024-10-23 | Stop reason: HOSPADM

## 2024-10-22 RX ORDER — PHENAZOPYRIDINE HYDROCHLORIDE 100 MG/1
100 TABLET, FILM COATED ORAL 3 TIMES DAILY PRN
Status: DISCONTINUED | OUTPATIENT
Start: 2024-10-22 | End: 2024-10-23 | Stop reason: HOSPADM

## 2024-10-22 RX ORDER — AMLODIPINE BESYLATE 5 MG/1
5 TABLET ORAL
Status: DISCONTINUED | OUTPATIENT
Start: 2024-10-22 | End: 2024-10-23 | Stop reason: HOSPADM

## 2024-10-22 RX ADMIN — PROPRANOLOL HYDROCHLORIDE 40 MG: 40 TABLET ORAL at 09:03

## 2024-10-22 RX ADMIN — Medication 1 TABLET: at 09:03

## 2024-10-22 RX ADMIN — Medication 10 ML: at 20:10

## 2024-10-22 RX ADMIN — PROPRANOLOL HYDROCHLORIDE 40 MG: 40 TABLET ORAL at 20:10

## 2024-10-22 RX ADMIN — Medication 10 ML: at 09:12

## 2024-10-22 RX ADMIN — PHENAZOPYRIDINE 100 MG: 100 TABLET ORAL at 20:10

## 2024-10-22 RX ADMIN — POLYETHYLENE GLYCOL 3350 17 G: 17 POWDER, FOR SOLUTION ORAL at 09:03

## 2024-10-23 ENCOUNTER — READMISSION MANAGEMENT (OUTPATIENT)
Dept: CALL CENTER | Facility: HOSPITAL | Age: 89
End: 2024-10-23
Payer: MEDICARE

## 2024-10-23 VITALS
HEART RATE: 74 BPM | HEIGHT: 67 IN | DIASTOLIC BLOOD PRESSURE: 82 MMHG | TEMPERATURE: 97.5 F | RESPIRATION RATE: 18 BRPM | OXYGEN SATURATION: 97 % | BODY MASS INDEX: 26.3 KG/M2 | SYSTOLIC BLOOD PRESSURE: 187 MMHG | WEIGHT: 167.55 LBS

## 2024-10-23 LAB
BACTERIA SPEC AEROBE CULT: ABNORMAL
DEPRECATED RDW RBC AUTO: 40.9 FL (ref 37–54)
ERYTHROCYTE [DISTWIDTH] IN BLOOD BY AUTOMATED COUNT: 12.2 % (ref 12.3–15.4)
HCT VFR BLD AUTO: 29.1 % (ref 34–46.6)
HGB BLD-MCNC: 9.8 G/DL (ref 12–15.9)
MCH RBC QN AUTO: 30.8 PG (ref 26.6–33)
MCHC RBC AUTO-ENTMCNC: 33.7 G/DL (ref 31.5–35.7)
MCV RBC AUTO: 91.5 FL (ref 79–97)
PLATELET # BLD AUTO: 235 10*3/MM3 (ref 140–450)
PMV BLD AUTO: 10 FL (ref 6–12)
RBC # BLD AUTO: 3.18 10*6/MM3 (ref 3.77–5.28)
WBC NRBC COR # BLD AUTO: 9.62 10*3/MM3 (ref 3.4–10.8)

## 2024-10-23 PROCEDURE — 85027 COMPLETE CBC AUTOMATED: CPT | Performed by: INTERNAL MEDICINE

## 2024-10-23 PROCEDURE — 25010000002 CEFTRIAXONE PER 250 MG: Performed by: UROLOGY

## 2024-10-23 RX ORDER — CEPHALEXIN 500 MG/1
500 CAPSULE ORAL 3 TIMES DAILY
Qty: 30 CAPSULE | Refills: 0 | Status: SHIPPED | OUTPATIENT
Start: 2024-10-23 | End: 2024-11-02

## 2024-10-23 RX ADMIN — LISINOPRIL 20 MG: 20 TABLET ORAL at 09:08

## 2024-10-23 RX ADMIN — Medication 1 TABLET: at 09:08

## 2024-10-23 RX ADMIN — PROPRANOLOL HYDROCHLORIDE 40 MG: 40 TABLET ORAL at 09:08

## 2024-10-23 RX ADMIN — Medication 10 ML: at 09:10

## 2024-10-23 RX ADMIN — AMLODIPINE BESYLATE 5 MG: 5 TABLET ORAL at 09:13

## 2024-10-23 RX ADMIN — CEFTRIAXONE 2000 MG: 2 INJECTION, POWDER, FOR SOLUTION INTRAMUSCULAR; INTRAVENOUS at 00:20

## 2024-10-23 RX ADMIN — HYDROCODONE BITARTRATE AND ACETAMINOPHEN 1 TABLET: 7.5; 325 TABLET ORAL at 09:14

## 2024-10-23 RX ADMIN — HYDROCODONE BITARTRATE AND ACETAMINOPHEN 1 TABLET: 7.5; 325 TABLET ORAL at 00:15

## 2024-10-24 ENCOUNTER — TRANSITIONAL CARE MANAGEMENT TELEPHONE ENCOUNTER (OUTPATIENT)
Dept: CALL CENTER | Facility: HOSPITAL | Age: 89
End: 2024-10-24
Payer: MEDICARE

## 2024-10-25 LAB
BACTERIA SPEC AEROBE CULT: NORMAL
BACTERIA SPEC AEROBE CULT: NORMAL

## 2024-11-04 ENCOUNTER — ANESTHESIA (OUTPATIENT)
Dept: PERIOP | Facility: HOSPITAL | Age: 89
End: 2024-11-04
Payer: MEDICARE

## 2024-11-04 ENCOUNTER — READMISSION MANAGEMENT (OUTPATIENT)
Dept: CALL CENTER | Facility: HOSPITAL | Age: 89
End: 2024-11-04
Payer: MEDICARE

## 2024-11-04 ENCOUNTER — HOSPITAL ENCOUNTER (OUTPATIENT)
Facility: HOSPITAL | Age: 89
Setting detail: HOSPITAL OUTPATIENT SURGERY
Discharge: HOME | End: 2024-11-04
Attending: STUDENT IN AN ORGANIZED HEALTH CARE EDUCATION/TRAINING PROGRAM | Admitting: STUDENT IN AN ORGANIZED HEALTH CARE EDUCATION/TRAINING PROGRAM
Payer: MEDICARE

## 2024-11-04 ENCOUNTER — ANESTHESIA EVENT (OUTPATIENT)
Dept: PERIOP | Facility: HOSPITAL | Age: 89
End: 2024-11-04
Payer: MEDICARE

## 2024-11-04 VITALS
SYSTOLIC BLOOD PRESSURE: 144 MMHG | TEMPERATURE: 97.7 F | DIASTOLIC BLOOD PRESSURE: 68 MMHG | HEART RATE: 70 BPM | OXYGEN SATURATION: 98 % | RESPIRATION RATE: 17 BRPM

## 2024-11-04 DIAGNOSIS — N20.1 LEFT URETERAL STONE: Primary | ICD-10-CM

## 2024-11-04 PROCEDURE — 25010000002 PROPOFOL 200 MG/20ML EMULSION: Performed by: NURSE ANESTHETIST, CERTIFIED REGISTERED

## 2024-11-04 PROCEDURE — 25010000002 LIDOCAINE 2% SOLUTION: Performed by: NURSE ANESTHETIST, CERTIFIED REGISTERED

## 2024-11-04 PROCEDURE — 25810000003 LACTATED RINGERS PER 1000 ML: Performed by: ANESTHESIOLOGY

## 2024-11-04 PROCEDURE — 25010000002 DEXAMETHASONE SODIUM PHOSPHATE 20 MG/5ML SOLUTION: Performed by: NURSE ANESTHETIST, CERTIFIED REGISTERED

## 2024-11-04 PROCEDURE — 25010000002 CEFAZOLIN PER 500 MG: Performed by: STUDENT IN AN ORGANIZED HEALTH CARE EDUCATION/TRAINING PROGRAM

## 2024-11-04 PROCEDURE — 25010000002 ONDANSETRON PER 1 MG: Performed by: NURSE ANESTHETIST, CERTIFIED REGISTERED

## 2024-11-04 RX ORDER — DOCUSATE SODIUM 100 MG/1
100 CAPSULE, LIQUID FILLED ORAL 2 TIMES DAILY
Qty: 28 CAPSULE | Refills: 0 | Status: SHIPPED | OUTPATIENT
Start: 2024-11-04 | End: 2024-11-13

## 2024-11-04 RX ORDER — SODIUM CHLORIDE 0.9 % (FLUSH) 0.9 %
3-10 SYRINGE (ML) INJECTION AS NEEDED
Status: DISCONTINUED | OUTPATIENT
Start: 2024-11-04 | End: 2024-11-04 | Stop reason: HOSPADM

## 2024-11-04 RX ORDER — MIDAZOLAM HYDROCHLORIDE 1 MG/ML
0.5 INJECTION, SOLUTION INTRAMUSCULAR; INTRAVENOUS
Status: DISCONTINUED | OUTPATIENT
Start: 2024-11-04 | End: 2024-11-04 | Stop reason: HOSPADM

## 2024-11-04 RX ORDER — DROPERIDOL 2.5 MG/ML
0.62 INJECTION, SOLUTION INTRAMUSCULAR; INTRAVENOUS
Status: DISCONTINUED | OUTPATIENT
Start: 2024-11-04 | End: 2024-11-04 | Stop reason: HOSPADM

## 2024-11-04 RX ORDER — NALOXONE HCL 0.4 MG/ML
0.2 VIAL (ML) INJECTION AS NEEDED
Status: DISCONTINUED | OUTPATIENT
Start: 2024-11-04 | End: 2024-11-04 | Stop reason: HOSPADM

## 2024-11-04 RX ORDER — EPHEDRINE SULFATE 50 MG/ML
INJECTION INTRAVENOUS AS NEEDED
Status: DISCONTINUED | OUTPATIENT
Start: 2024-11-04 | End: 2024-11-04 | Stop reason: SURG

## 2024-11-04 RX ORDER — HYDROCODONE BITARTRATE AND ACETAMINOPHEN 5; 325 MG/1; MG/1
1 TABLET ORAL ONCE AS NEEDED
Status: DISCONTINUED | OUTPATIENT
Start: 2024-11-04 | End: 2024-11-04 | Stop reason: HOSPADM

## 2024-11-04 RX ORDER — ONDANSETRON 2 MG/ML
INJECTION INTRAMUSCULAR; INTRAVENOUS AS NEEDED
Status: DISCONTINUED | OUTPATIENT
Start: 2024-11-04 | End: 2024-11-04 | Stop reason: SURG

## 2024-11-04 RX ORDER — LABETALOL HYDROCHLORIDE 5 MG/ML
5 INJECTION, SOLUTION INTRAVENOUS
Status: DISCONTINUED | OUTPATIENT
Start: 2024-11-04 | End: 2024-11-04 | Stop reason: HOSPADM

## 2024-11-04 RX ORDER — EPHEDRINE SULFATE 50 MG/ML
5 INJECTION, SOLUTION INTRAVENOUS ONCE AS NEEDED
Status: DISCONTINUED | OUTPATIENT
Start: 2024-11-04 | End: 2024-11-04 | Stop reason: HOSPADM

## 2024-11-04 RX ORDER — PROMETHAZINE HYDROCHLORIDE 25 MG/1
25 TABLET ORAL ONCE AS NEEDED
Status: DISCONTINUED | OUTPATIENT
Start: 2024-11-04 | End: 2024-11-04 | Stop reason: HOSPADM

## 2024-11-04 RX ORDER — SODIUM CHLORIDE, SODIUM LACTATE, POTASSIUM CHLORIDE, CALCIUM CHLORIDE 600; 310; 30; 20 MG/100ML; MG/100ML; MG/100ML; MG/100ML
9 INJECTION, SOLUTION INTRAVENOUS CONTINUOUS
Status: DISCONTINUED | OUTPATIENT
Start: 2024-11-04 | End: 2024-11-04 | Stop reason: HOSPADM

## 2024-11-04 RX ORDER — LIDOCAINE HYDROCHLORIDE 20 MG/ML
INJECTION, SOLUTION INFILTRATION; PERINEURAL AS NEEDED
Status: DISCONTINUED | OUTPATIENT
Start: 2024-11-04 | End: 2024-11-04 | Stop reason: SURG

## 2024-11-04 RX ORDER — FLUMAZENIL 0.1 MG/ML
0.2 INJECTION INTRAVENOUS AS NEEDED
Status: DISCONTINUED | OUTPATIENT
Start: 2024-11-04 | End: 2024-11-04 | Stop reason: HOSPADM

## 2024-11-04 RX ORDER — PROPOFOL 10 MG/ML
INJECTION, EMULSION INTRAVENOUS AS NEEDED
Status: DISCONTINUED | OUTPATIENT
Start: 2024-11-04 | End: 2024-11-04 | Stop reason: SURG

## 2024-11-04 RX ORDER — HYDRALAZINE HYDROCHLORIDE 20 MG/ML
5 INJECTION INTRAMUSCULAR; INTRAVENOUS
Status: DISCONTINUED | OUTPATIENT
Start: 2024-11-04 | End: 2024-11-04 | Stop reason: HOSPADM

## 2024-11-04 RX ORDER — CEPHALEXIN 500 MG/1
500 CAPSULE ORAL 3 TIMES DAILY
Qty: 9 CAPSULE | Refills: 0 | Status: SHIPPED | OUTPATIENT
Start: 2024-11-04 | End: 2024-11-07

## 2024-11-04 RX ORDER — HYDROMORPHONE HYDROCHLORIDE 1 MG/ML
0.25 INJECTION, SOLUTION INTRAMUSCULAR; INTRAVENOUS; SUBCUTANEOUS
Status: DISCONTINUED | OUTPATIENT
Start: 2024-11-04 | End: 2024-11-04 | Stop reason: HOSPADM

## 2024-11-04 RX ORDER — FENTANYL CITRATE 50 UG/ML
50 INJECTION, SOLUTION INTRAMUSCULAR; INTRAVENOUS ONCE AS NEEDED
Status: DISCONTINUED | OUTPATIENT
Start: 2024-11-04 | End: 2024-11-04 | Stop reason: HOSPADM

## 2024-11-04 RX ORDER — HYDROCODONE BITARTRATE AND ACETAMINOPHEN 7.5; 325 MG/1; MG/1
1 TABLET ORAL EVERY 4 HOURS PRN
Status: DISCONTINUED | OUTPATIENT
Start: 2024-11-04 | End: 2024-11-04 | Stop reason: HOSPADM

## 2024-11-04 RX ORDER — DIPHENHYDRAMINE HYDROCHLORIDE 50 MG/ML
12.5 INJECTION INTRAMUSCULAR; INTRAVENOUS
Status: DISCONTINUED | OUTPATIENT
Start: 2024-11-04 | End: 2024-11-04 | Stop reason: HOSPADM

## 2024-11-04 RX ORDER — ONDANSETRON 2 MG/ML
4 INJECTION INTRAMUSCULAR; INTRAVENOUS ONCE AS NEEDED
Status: DISCONTINUED | OUTPATIENT
Start: 2024-11-04 | End: 2024-11-04 | Stop reason: HOSPADM

## 2024-11-04 RX ORDER — HYDROCODONE BITARTRATE AND ACETAMINOPHEN 5; 325 MG/1; MG/1
1 TABLET ORAL EVERY 8 HOURS PRN
Qty: 9 TABLET | Refills: 0 | Status: SHIPPED | OUTPATIENT
Start: 2024-11-04 | End: 2024-11-07

## 2024-11-04 RX ORDER — FAMOTIDINE 10 MG/ML
20 INJECTION, SOLUTION INTRAVENOUS ONCE
Status: COMPLETED | OUTPATIENT
Start: 2024-11-04 | End: 2024-11-04

## 2024-11-04 RX ORDER — SODIUM CHLORIDE 0.9 % (FLUSH) 0.9 %
3 SYRINGE (ML) INJECTION EVERY 12 HOURS SCHEDULED
Status: DISCONTINUED | OUTPATIENT
Start: 2024-11-04 | End: 2024-11-04 | Stop reason: HOSPADM

## 2024-11-04 RX ORDER — IPRATROPIUM BROMIDE AND ALBUTEROL SULFATE 2.5; .5 MG/3ML; MG/3ML
3 SOLUTION RESPIRATORY (INHALATION) ONCE AS NEEDED
Status: DISCONTINUED | OUTPATIENT
Start: 2024-11-04 | End: 2024-11-04 | Stop reason: HOSPADM

## 2024-11-04 RX ORDER — LIDOCAINE HYDROCHLORIDE 10 MG/ML
0.5 INJECTION, SOLUTION INFILTRATION; PERINEURAL ONCE AS NEEDED
Status: DISCONTINUED | OUTPATIENT
Start: 2024-11-04 | End: 2024-11-04 | Stop reason: HOSPADM

## 2024-11-04 RX ORDER — DEXAMETHASONE SODIUM PHOSPHATE 4 MG/ML
INJECTION, SOLUTION INTRA-ARTICULAR; INTRALESIONAL; INTRAMUSCULAR; INTRAVENOUS; SOFT TISSUE AS NEEDED
Status: DISCONTINUED | OUTPATIENT
Start: 2024-11-04 | End: 2024-11-04 | Stop reason: SURG

## 2024-11-04 RX ORDER — FENTANYL CITRATE 50 UG/ML
25 INJECTION, SOLUTION INTRAMUSCULAR; INTRAVENOUS
Status: DISCONTINUED | OUTPATIENT
Start: 2024-11-04 | End: 2024-11-04 | Stop reason: HOSPADM

## 2024-11-04 RX ORDER — PROMETHAZINE HYDROCHLORIDE 25 MG/1
25 SUPPOSITORY RECTAL ONCE AS NEEDED
Status: DISCONTINUED | OUTPATIENT
Start: 2024-11-04 | End: 2024-11-04 | Stop reason: HOSPADM

## 2024-11-04 RX ADMIN — SODIUM CHLORIDE, POTASSIUM CHLORIDE, SODIUM LACTATE AND CALCIUM CHLORIDE 9 ML/HR: 600; 310; 30; 20 INJECTION, SOLUTION INTRAVENOUS at 13:37

## 2024-11-04 RX ADMIN — LIDOCAINE HYDROCHLORIDE 60 MG: 20 INJECTION, SOLUTION INFILTRATION; PERINEURAL at 14:10

## 2024-11-04 RX ADMIN — PROPOFOL 100 MG: 10 INJECTION, EMULSION INTRAVENOUS at 14:10

## 2024-11-04 RX ADMIN — FAMOTIDINE 20 MG: 10 INJECTION INTRAVENOUS at 13:32

## 2024-11-04 RX ADMIN — SODIUM CHLORIDE 2000 MG: 900 INJECTION INTRAVENOUS at 13:56

## 2024-11-04 RX ADMIN — EPHEDRINE SULFATE 10 MG: 50 INJECTION INTRAVENOUS at 14:18

## 2024-11-04 RX ADMIN — ONDANSETRON 4 MG: 2 INJECTION INTRAMUSCULAR; INTRAVENOUS at 14:13

## 2024-11-04 RX ADMIN — DEXAMETHASONE SODIUM PHOSPHATE 4 MG: 4 INJECTION, SOLUTION INTRAMUSCULAR; INTRAVENOUS at 14:13

## 2024-11-04 RX ADMIN — EPHEDRINE SULFATE 5 MG: 50 INJECTION INTRAVENOUS at 14:34

## 2024-11-04 NOTE — H&P
FIRST UROLOGY CONSULT      Patient Identification:  NAME:  Keyana Liz  Age:  89 y.o.   Sex:  female   :  9/15/1933   MRN:  [id number]     Chief complaint: L renal stone    History of present illness:      L renal stone s/p stent        Past medical history:  Past Medical History:   Diagnosis Date    Acute bronchitis     Allergic rhinitis     Analgesic overuse headache     Arthritis     Benign essential hypertension     Benign paroxysmal positional vertigo     Cancer     Chronic renal insufficiency     Cluster headache     Constipation     Cough     Dehydration     Dizziness     Dysuria     Fall at home     Fatigue     Hypertension     Hypertension, uncontrolled     Hypertensive urgency     Increased frequency of urination     Kidney stones     Osteoporosis     Otitis media     Postmenopausal HRT (hormone replacement therapy)     Short-term memory loss     Shoulder pain, left     TACS (trigeminal autonomic cephalgias)     Urinary frequency     UTI symptoms     Vertigo     Vitamin B12 deficiency        Past surgical history:  Past Surgical History:   Procedure Laterality Date    CYSTOSCOPY W/ URETERAL STENT PLACEMENT N/A 10/20/2024    Procedure: CYSTOSCOPY URETERAL CATHETER/STENT INSERTION;  Surgeon: Maverick Park MD;  Location: [facility];  Service: Urology;  Laterality: N/A;    FEMUR FRACTURE SURGERY Left     FEMUR IM NAILING/RODDING Left 3/16/2019    Procedure: Fixation of periprosthetic femur fracture;  Surgeon: Lauro Friedman MD;  Location: [facility] MAIN OR;  Service: Orthopedics    LUMBAR EPIDURAL INJECTION N/A 10/27/2021    Procedure: LUMBAR EPIDURAL 1ST VISIT Lumbar 5-sacral 1;  Surgeon: Brittani Fleming MD;  [place] [facility] [place];  Service: Pain Management;  Laterality: N/A;    LUMBAR EPIDURAL INJECTION N/A 2021    Procedure: lumbar epidural steroid injection;  Surgeon: Brittani Fleming MD;  [place] [facility] [place];  Service: Pain Management;  Laterality: N/A;    MASTECTOMY          Allergies:  Cvs diuretic maximum strength [pamabrom]    Home medications:  Medications Prior to Admission   Medication Sig Dispense Refill Last Dose/Taking    amLODIPine (NORVASC) 5 MG tablet Take 1.5 tablets by mouth Daily. 135 tablet 3 Taking    Calcium 600-200 MG-UNIT per tablet Take 1 tablet by mouth 2 (Two) Times a Day.   Taking    carBAMazepine (TEGretol) 100 MG chewable tablet Chew 1 tablet Daily As Needed (cluster headache). 30 tablet 0 Taking As Needed    lisinopril (PRINIVIL,ZESTRIL) 20 MG tablet Take 1 tablet by mouth Daily. for blood pressure 90 tablet 3 Taking    Multiple Vitamins-Minerals (MULTIVITAMIN WITH MINERALS) tablet tablet Take 1 tablet by mouth Daily.   Taking    omeprazole (priLOSEC) 20 MG capsule Take 1 capsule by mouth Daily.   Taking    propranolol (INDERAL) 40 MG tablet Take 1 tablet by mouth 2 (Two) Times a Day. 180 tablet 1 Taking        Hospital medications:    No current facility-administered medications for this encounter.        Family history:  Family History   Problem Relation Age of Onset    No Known Problems Mother     No Known Problems Father     Malig Hyperthermia Neg Hx        Social history:  Social History     Tobacco Use    Smoking status: Never     Passive exposure: Never    Smokeless tobacco: Never    Tobacco comments:     4 cups of coffee in the AM   Vaping Use    Vaping status: Never Used   Substance Use Topics    Alcohol use: No    Drug use: No       Review of systems:      Positive for:  nothing  Negative for:  chest pain, cough, sob, o/w neg    Objective:  TMax 24 hours:   No data recorded.      Vitals Ranges:        Intake/Output Last 3 shifts:  No intake/output data recorded.     Physical Exam:    General Appearance:    Alert, cooperative, NAD   Back:     No CVA tenderness   Lungs:     Respirations unlabored, no wheezing    Heart:    RRR, intact peripheral pulses   Abdomen:     Soft, NDNT, no masses, no guarding   :    Pelvic not performed, bladder  nondistended and nontender   Neuro/Psych:   Orientation intact, mood/affect pleasant       Results review:   I reviewed the patient's new clinical results.    Data review:  Lab Results (last 24 hours)       ** No results found for the last 24 hours. **             Imaging:  Imaging Results (Last 24 Hours)       ** No results found for the last 24 hours. **             Assessment:     L renal stone    Plan:     - L ESWL  - cefazolin  - RBO explained to Pt    Georgi Patel MD  11/04/24  12:53 EST

## 2024-11-04 NOTE — OP NOTE
Operative Report     [facility] MAIN OR    Patient: Keyana Liz  Age:      89 y.o.  :     9/15/1933  Sex:      female    Medical Record:  [id number]    Date of Operation/Procedure:  2024    Pre-operative Diagnosis:  L ureteral stone    Post-operative Diagnosis:  L ureteral stone    Surgeon:      DR. Georgi Patel    Name of Operation/Procedure:  Procedure(s) and Anesthesia Type:     * LEFT SHOCKWAVE LITHOTRIPSY - General; first of a staged procedure    Findings/Complications:      L proximal ureteral stone alongside stent    Description of procedure:     After appropriate discussion of risks and benefits and informed consent obtained, patient transported to operating room and positioned in supine position. General anesthesia induced. Pre operative antibiotics administered. Timeout completed. Spot fluoroscopy demonstrated stone in proximal ureter alongside stent. Focused in on the stone and began extracorporeal shockwave lithotripsy at a rate of 120 and power of 2. Increased power to 6 slowly. After 3000 shocks there appeared to be good lithotripsy of the stone. Procedure concluded. Patient extubated and transported to PACU in stable condition.    Plan: 1 week follow pu with interval KUB to assess for stone passage    Estimated Blood Loss: none    Specimens: * No orders in the log *    Fluids/Drains: none    Georgi Patel MD  2024  14:14 EST

## 2024-11-04 NOTE — OUTREACH NOTE
Sepsis Week 2 Survey      Flowsheet Row Responses   [facility] patient discharged from? [place]   Does the patient have one of the following disease processes/diagnoses(primary or secondary)? Sepsis   Week 2 attempt successful? No   Unsuccessful attempts Attempt 1   Revoke Readmitted            Soco DAILEY - Registered Nurse

## 2024-11-04 NOTE — ANESTHESIA PROCEDURE NOTES
Airway  Date/Time: 11/4/2024 2:11 PM  Airway not difficult    General Information and Staff    Patient location during procedure: OR  Anesthesiologist: Shawn Love MD  CRNA/CAA: Debra Bhatia CRNA    Indications and Patient Condition  Indications for airway management: airway protection    Preoxygenated: yes  MILS maintained throughout  Mask difficulty assessment: 0 - not attempted    Final Airway Details  Final airway type: supraglottic airway      Successful airway: LMA  Size 4     Number of attempts at approach: 1  Assessment: lips, teeth, and gum same as pre-op and atraumatic intubation

## 2024-11-04 NOTE — ANESTHESIA PREPROCEDURE EVALUATION
Anesthesia Evaluation     Patient summary reviewed and Nursing notes reviewed   NPO Solid Status: > 8 hours  NPO Liquid Status: > 2 hours           Airway   Mallampati: II  TM distance: >3 FB  Neck ROM: full  Possible difficult intubation  Dental - normal exam     Pulmonary    (+) ,shortness of breath  Cardiovascular     ECG reviewed    (+) hypertension      Neuro/Psych  (+) headaches, dizziness/light headedness, numbness  GI/Hepatic/Renal/Endo    (+) hiatal hernia, GERD well controlled, renal disease- CRI    Musculoskeletal     Abdominal    Substance History      OB/GYN          Other   arthritis, blood dyscrasia anemia,   history of cancer                      Anesthesia Plan    ASA 3     general     (I have reviewed the patient's history and chart with the patient, including all pertinent laboratory results and imaging. I have explained the risks of anesthesia including but not limited to dental damage, corneal abrasion, nerve injury, MI, stroke, aspiration, and death. Patient has agreed to proceed.      )  intravenous induction     Anesthetic plan, risks, benefits, and alternatives have been provided, discussed and informed consent has been obtained with: patient.      CODE STATUS:

## 2024-11-04 NOTE — ANESTHESIA POSTPROCEDURE EVALUATION
Patient: Keyana Liz    Procedure Summary       Date: 11/04/24 Room / Location: [facility] OR 09 / [facility] MAIN OR    Anesthesia Start: 1400 Anesthesia Stop: 1449    Procedure: LEFT SHOCKWAVE LITHOTRIPSY (Left) Diagnosis:     Surgeons: Georgi Patel MD Provider: Shawn Love MD    Anesthesia Type: general ASA Status: 3            Anesthesia Type: general    Vitals  Vitals Value Taken Time   /62 11/04/24 1545   Temp 36.5 °C (97.7 °F) 11/04/24 1530   Pulse 64 11/04/24 1556   Resp 18 11/04/24 1530   SpO2 100 % 11/04/24 1556   Vitals shown include unfiled device data.        Post Anesthesia Care and Evaluation    Patient location during evaluation: bedside  Patient participation: complete - patient participated  Level of consciousness: awake and alert  Pain management: adequate    Airway patency: patent  Anesthetic complications: No anesthetic complications    Cardiovascular status: acceptable  Respiratory status: acceptable  Hydration status: acceptable    Comments: /75   Pulse 65   Temp 36.5 °C (97.7 °F) (Oral)   Resp 18   SpO2 100%

## 2024-11-07 ENCOUNTER — HOSPITAL ENCOUNTER (OUTPATIENT)
Facility: HOSPITAL | Age: 89
Discharge: HOME | End: 2024-11-07
Attending: EMERGENCY MEDICINE | Admitting: EMERGENCY MEDICINE
Payer: MEDICARE

## 2024-11-07 ENCOUNTER — APPOINTMENT (OUTPATIENT)
Dept: GENERAL RADIOLOGY | Facility: HOSPITAL | Age: 89
End: 2024-11-07
Payer: MEDICARE

## 2024-11-07 VITALS
BODY MASS INDEX: 26.68 KG/M2 | DIASTOLIC BLOOD PRESSURE: 67 MMHG | TEMPERATURE: 98.1 F | HEIGHT: 67 IN | RESPIRATION RATE: 16 BRPM | WEIGHT: 170 LBS | SYSTOLIC BLOOD PRESSURE: 133 MMHG | HEART RATE: 74 BPM | OXYGEN SATURATION: 96 %

## 2024-11-07 DIAGNOSIS — R05.1 ACUTE COUGH: Primary | ICD-10-CM

## 2024-11-07 LAB
FLUAV SUBTYP SPEC NAA+PROBE: NOT DETECTED
FLUBV RNA ISLT QL NAA+PROBE: NOT DETECTED
SARS-COV-2 RNA RESP QL NAA+PROBE: NOT DETECTED

## 2024-11-07 PROCEDURE — 71046 X-RAY EXAM CHEST 2 VIEWS: CPT

## 2024-11-07 PROCEDURE — G0463 HOSPITAL OUTPT CLINIC VISIT: HCPCS | Performed by: PHYSICIAN ASSISTANT

## 2024-11-07 PROCEDURE — 99214 OFFICE O/P EST MOD 30 MIN: CPT | Performed by: PHYSICIAN ASSISTANT

## 2024-11-07 PROCEDURE — 87636 SARSCOV2 & INF A&B AMP PRB: CPT | Performed by: EMERGENCY MEDICINE

## 2024-11-07 RX ORDER — ALBUTEROL SULFATE 90 UG/1
2 INHALANT RESPIRATORY (INHALATION) EVERY 4 HOURS PRN
Qty: 18 G | Refills: 0 | Status: SHIPPED | OUTPATIENT
Start: 2024-11-07

## 2024-11-07 RX ORDER — DEXTROMETHORPHAN POLISTIREX 30 MG/5ML
60 SUSPENSION ORAL EVERY 12 HOURS SCHEDULED
Qty: 280 ML | Refills: 0 | Status: SHIPPED | OUTPATIENT
Start: 2024-11-07 | End: 2024-11-13

## 2024-11-07 RX ORDER — AZITHROMYCIN 250 MG/1
TABLET, FILM COATED ORAL
Qty: 6 TABLET | Refills: 0 | Status: SHIPPED | OUTPATIENT
Start: 2024-11-07 | End: 2024-11-13

## 2024-11-07 NOTE — FSED PROVIDER NOTE
EMERGENCY DEPARTMENT ENCOUNTER    Room Number:  01/01  Date seen:  11/7/2024  Time seen: 10:04 EST  PCP: Elizabeth Soto MD  Historian: Patient    Discussed/obtained information from independent historians: N/A    HPI:  Chief complaint: Cough and chills  A complete HPI/ROS/PMH/PSH/SH/FH are unobtainable due to: Nothing  Context:Keyana Liz is a 89 y.o. female with significant past medical history of hypertension, BPPV, chronic renal insufficiency, acute bronchitis, ureteral stones, pyelonephritis, sepsis who presents to the ED with c/o cough chills this been ongoing for the past 3 days.  Patient reports cough is productive with green sputum.  No specific sick contacts but the patient reports she has been in and out of the hospital for ureteral stone x 2 over the past few weeks.  Patient denies urinary symptoms.  No chest pain, palpitations, shortness of breath, abdominal pain.  Patient is here for further evaluation.    External (non-ED) record review: Patient had a 1 cm kidney stone and had a stent placement performed by Dr. Rod Park on 10/20/2024.  Patient had to have a repeat shockwave lithotripsy performed by Dr. Georgi Patel on 11/4/2024.    Chronic or social conditions impacting care:    ALLERGIES  Cvs diuretic maximum strength [pamabrom]    PAST MEDICAL HISTORY  Active Ambulatory Problems     Diagnosis Date Noted    Zoraida-prosthetic fracture of femur at tip of prosthesis 03/14/2019    Hypotension 03/14/2019    Closed supracondylar fracture of left humerus 03/14/2019    Allergic rhinitis     Arthritis     Benign essential hypertension     Benign paroxysmal positional vertigo     Chronic renal insufficiency     Age-related osteoporosis without current pathological fracture     Other screening mammogram     Medicare annual wellness visit, subsequent 12/11/2019    Spinal stenosis, lumbar region, with neurogenic claudication 01/29/2021    Dizziness 07/11/2022    Lumbar radiculopathy 07/11/2022     Abnormal urine 07/27/2022    Hyperkalemia 07/27/2022    Fatigue 07/27/2022    Severe headache 07/27/2022    Shortness of breath 07/27/2022    Abnormal EKG 07/27/2022    Generalized weakness 07/27/2022    Electrolyte disturbance 07/29/2022    Dyspnea 07/29/2022    Lung nodule 08/04/2022    Elevated TSH 08/04/2022    Adrenal adenoma, left 08/04/2022    Hiatal hernia 08/04/2022    Hyponatremia 08/04/2022    Overactive bladder 03/01/2023    Osteoporosis 03/08/2023    Trigger middle finger of right hand 07/03/2023    Episodic cluster headache, not intractable 07/03/2023    Acute pyelonephritis 10/20/2024    Left ureteral stone 10/20/2024    Sepsis secondary to UTI 10/20/2024    Abnormal CT of the abdomen 10/20/2024    Acute UTI 10/21/2024     Resolved Ambulatory Problems     Diagnosis Date Noted    Leukocytosis 03/14/2019    Acute post-hemorrhagic anemia 03/15/2019    JAI (acute kidney injury) 03/15/2019    Postmenopausal HRT (hormone replacement therapy)     Lumbar radiculitis 12/01/2021    Lumbar stenosis with neurogenic claudication 12/01/2021    Primary hypertension 07/11/2022     Past Medical History:   Diagnosis Date    Acute bronchitis     Analgesic overuse headache     Cancer     Cluster headache     Constipation     Cough     Dehydration     Dysuria     Fall at home     Hypertension     Hypertension, uncontrolled     Hypertensive urgency     Increased frequency of urination     Kidney stones     Otitis media     Short-term memory loss     Shoulder pain, left     TACS (trigeminal autonomic cephalgias)     Urinary frequency     UTI symptoms     Vertigo     Vitamin B12 deficiency        PAST SURGICAL HISTORY  Past Surgical History:   Procedure Laterality Date    CYSTOSCOPY W/ URETERAL STENT PLACEMENT N/A 10/20/2024    Procedure: CYSTOSCOPY URETERAL CATHETER/STENT INSERTION;  Surgeon: Maverick Park MD;  Location: [facility];  Service: Urology;  Laterality: N/A;    EXTRACORPOREAL SHOCK WAVE LITHOTRIPSY  (ESWL) Left 11/4/2024    Procedure: LEFT SHOCKWAVE LITHOTRIPSY;  Surgeon: Georgi Patel MD;  Location: [facility] MAIN OR;  Service: Urology;  Laterality: Left;    FEMUR FRACTURE SURGERY Left     FEMUR IM NAILING/RODDING Left 3/16/2019    Procedure: Fixation of periprosthetic femur fracture;  Surgeon: Lauro Friedman MD;  Location: [facility] MAIN OR;  Service: Orthopedics    LUMBAR EPIDURAL INJECTION N/A 10/27/2021    Procedure: LUMBAR EPIDURAL 1ST VISIT Lumbar 5-sacral 1;  Surgeon: Brittani Fleming MD;  Location: [facility];  Service: Pain Management;  Laterality: N/A;    LUMBAR EPIDURAL INJECTION N/A 12/13/2021    Procedure: lumbar epidural steroid injection;  Surgeon: Brittani Fleming MD;  Location: [facility];  Service: Pain Management;  Laterality: N/A;    MASTECTOMY         FAMILY HISTORY  Family History   Problem Relation Age of Onset    No Known Problems Mother     No Known Problems Father     Malig Hyperthermia Neg Hx        SOCIAL HISTORY  Social History     Socioeconomic History    Marital status:    Tobacco Use    Smoking status: Never     Passive exposure: Never    Smokeless tobacco: Never    Tobacco comments:     4 cups of coffee in the AM   Vaping Use    Vaping status: Never Used   Substance and Sexual Activity    Alcohol use: No    Drug use: No    Sexual activity: Defer       REVIEW OF SYSTEMS  Review of Systems    All systems reviewed and negative except for those discussed in HPI.     PHYSICAL EXAM    I have reviewed the triage vital signs and nursing notes.  Vitals:    11/07/24 0921   BP:    Pulse: 74   Resp:    Temp:    SpO2: 96%     Physical Exam    GENERAL: Frail in appearance, not distressed  HENT: nares patent  EYES: no scleral icterus  NECK: no ROM limitations  CV: regular rhythm, regular rate  RESPIRATORY: normal effort, lungs CTAB.  SpO2 96%  ABDOMEN: soft, nontender  : deferred  MUSCULOSKELETAL: no deformity  NEURO: alert, moves all extremities, follows commands  SKIN: warm,  dry    LAB RESULTS  Recent Results (from the past 24 hours)   COVID-19 and FLU A/B PCR, 1 HR TAT - Swab, Nasopharynx    Collection Time: 11/07/24  9:22 AM    Specimen: Nasopharynx; Swab   Result Value Ref Range    COVID19 Not Detected Not Detected - Ref. Range    Influenza A PCR Not Detected Not Detected    Influenza B PCR Not Detected Not Detected       Ordered the above labs and independently interpreted results.  My findings will be discussed in the ED course or medical decision making section below    RADIOLOGY RESULTS  XR Chest 2 View    Result Date: 11/7/2024  XR CHEST 2 VW-  HISTORY: Female who is 89 years-old, cough  TECHNIQUE: Frontal and lateral views of the chest  COMPARISON: 8/29/2024  FINDINGS: Heart size is normal. Pulmonary vasculature is unremarkable. Aorta is tortuous. No focal pulmonary consolidation, pleural effusion, or pneumothorax. Old granulomatous disease is apparent. No acute osseous process.      No focal pulmonary consolidation. Tortuous aorta. Follow-up as clinically indicated.      This report was finalized on 11/7/2024 9:51 AM by Dr. Cricket Sepulveda M.D on Workstation: [facility]        Ordered the above noted radiological studies.  Independently interpreted by me.  My findings will be discussed in the medical decision section below.     PROGRESS, DATA ANALYSIS, CONSULTS AND MEDICAL DECISION MAKING    Please note that this section constitutes my independent interpretation of clinical data including lab results, radiology, EKG's.  This constitutes my independent professional opinion regarding differential diagnosis and management of this patient.  It may include any factors such as history from outside sources, review of external records, social determinants of health, management of medications, response to those treatments, and discussions with other providers.       Orders placed during this visit:  Orders Placed This Encounter   Procedures    COVID-19 and FLU A/B PCR, 1 HR TAT - Swab,  Nasopharynx    XR Chest 2 View            Medical Decision Making    ACS, CHF, PNA, COVID, flu, other viral bronchitis, bacterial bronchitis.  COVID flu test were negative in triage.  Chest x-ray was performed and officially read as no acute pathology.  Patient has no chest pain, palpitations, shortness of breath.  Doubt cardiac process.  Clinical picture most consistent with acute bronchitis.  Patient is 89, old, frail, with multiple comorbidities.  She is a high risk for bacterial process.  Will treat with a Z-Suman, cough medicine, close primary care follow-up.      DIAGNOSIS  Final diagnoses:   Acute cough          Medication List        New Prescriptions      albuterol sulfate  (90 Base) MCG/ACT inhaler  Commonly known as: PROVENTIL HFA;VENTOLIN HFA;PROAIR HFA  Inhale 2 puffs Every 4 (Four) Hours As Needed for Shortness of Air or Wheezing (cough).     azithromycin 250 MG tablet  Commonly known as: Zithromax Z-Suman  Take 2 tablets by mouth on day 1, then 1 tablet daily on days 2-5     dextromethorphan polistirex ER 30 MG/5ML Suspension Extended Release oral suspension  Commonly known as: Delsym  Take 10 mL by mouth Every 12 (Twelve) Hours.               Where to Get Your Medications        These medications were sent to [id number] - [place], [place] - [address] AT [facility] [address] [facility] [phone] [facility] [phone]   [address] [address]      Phone: [phone]   albuterol sulfate  (90 Base) MCG/ACT inhaler  azithromycin 250 MG tablet  dextromethorphan polistirex ER 30 MG/5ML Suspension Extended Release oral suspension         FOLLOW-UP  Elizabeth Soto MD  [address] [facility] [[email or web address]] [address]  [facility] [id number] [phone]    On 11/11/2024  For further evaluation and treatment        Latest Documented Vital Signs:  As of 10:16 EST  BP- 133/67 HR- 74 Temp- 98.1 °F (36.7 °C) O2 sat- 96%    Appropriate PPE utilized throughout this  patient encounter to include mask, if indicated, per current protocol. Hand hygiene was performed before donning PPE and after removal when leaving the room.    Please note that portions of this were completed with a voice recognition program.     Note Disclaimer: At [facility], we believe that sharing information builds trust and better relationships. You are receiving this note because you are receiving care at [facility] or recently visited. It is possible you will see health information before a provider has talked with you about it. This kind of information can be easy to misunderstand. To help you fully understand what it means for your health, we urge you to discuss this note with your provider.

## 2024-11-07 NOTE — DISCHARGE INSTRUCTIONS
Take the medication prescribed.  Recommend rest and drinking plenty of fluids until resolve the symptoms.  Follow-up closely with your primary care physician on Monday for repeat evaluation.  Return to the ER if worsening symptoms or should have any further concerns.

## 2024-11-13 ENCOUNTER — OFFICE VISIT (OUTPATIENT)
Dept: FAMILY MEDICINE CLINIC | Facility: CLINIC | Age: 89
End: 2024-11-13
Payer: MEDICARE

## 2024-11-13 VITALS
OXYGEN SATURATION: 100 % | TEMPERATURE: 97.4 F | HEART RATE: 50 BPM | BODY MASS INDEX: 23.59 KG/M2 | WEIGHT: 150.6 LBS | DIASTOLIC BLOOD PRESSURE: 68 MMHG | SYSTOLIC BLOOD PRESSURE: 140 MMHG

## 2024-11-13 DIAGNOSIS — N10 ACUTE PYELONEPHRITIS: ICD-10-CM

## 2024-11-13 DIAGNOSIS — R93.5 ABNORMAL CT SCAN, PELVIS: ICD-10-CM

## 2024-11-13 DIAGNOSIS — N20.1 LEFT URETERAL STONE: Primary | ICD-10-CM

## 2024-11-13 PROCEDURE — 99214 OFFICE O/P EST MOD 30 MIN: CPT | Performed by: FAMILY MEDICINE

## 2024-11-13 PROCEDURE — 1159F MED LIST DOCD IN RCRD: CPT | Performed by: FAMILY MEDICINE

## 2024-11-13 PROCEDURE — G2211 COMPLEX E/M VISIT ADD ON: HCPCS | Performed by: FAMILY MEDICINE

## 2024-11-13 PROCEDURE — 1160F RVW MEDS BY RX/DR IN RCRD: CPT | Performed by: FAMILY MEDICINE

## 2024-11-13 PROCEDURE — 1126F AMNT PAIN NOTED NONE PRSNT: CPT | Performed by: FAMILY MEDICINE

## 2024-11-13 NOTE — PROGRESS NOTES
"Boyd Liz is a 89 y.o. female.     Chief Complaint   Patient presents with    Hospital Follow Up Visit     Patient was in the hospital about a month ago.  Discharged on October 23, 2024.  This was for left ureteral stone and acute UTI as well as pyelonephritis.  Urology was consulted and she had an urgent cystoscopy with stent placement.  Her symptoms significantly improved.  She was sent home on antibiotics with follow-up with the urologist.  She was put on Keflex.  Records reviewed.  There was suspected endometrial thickening on CAT scan of her abdomen and the radiologist is recommending that we correlate this with pelvic ultrasound. Not having vaginal bleeding.     Pt reports that since the hospitalization she has had her stone \"crushed\" (I suspect lithotripsy.) and she still has some large pieces so the stent is still in place. They are planning another lithotripsy. Pt feels better overall. No longer feels like she has an infection. Pt's pain is controlled with tylenol. She does have pain medication but she is unwilling to take this.     Is recovering from a cold. Has had a zpak and feels much better.       The following portions of the patient's history were reviewed and updated as appropriate: allergies, current medications, past family history, past medical history, past social history, past surgical history and problem list.    Past Medical History:   Diagnosis Date    Acute bronchitis     Allergic rhinitis     Analgesic overuse headache     Arthritis     Benign essential hypertension     Benign paroxysmal positional vertigo     Cancer     Chronic renal insufficiency     Cluster headache     Constipation     Cough     Dehydration     Dizziness     Dysuria     Fall at home     Fatigue     Hypertension     Hypertension, uncontrolled     Hypertensive urgency     Increased frequency of urination     Kidney stones     Osteoporosis     Otitis media     Postmenopausal HRT (hormone replacement " therapy)     Short-term memory loss     Shoulder pain, left     TACS (trigeminal autonomic cephalgias)     Urinary frequency     UTI symptoms     Vertigo     Vitamin B12 deficiency        Past Surgical History:   Procedure Laterality Date    CYSTOSCOPY W/ URETERAL STENT PLACEMENT N/A 10/20/2024    Procedure: CYSTOSCOPY URETERAL CATHETER/STENT INSERTION;  Surgeon: Maverick Park MD;  Location: [facility] OR;  Service: Urology;  Laterality: N/A;    EXTRACORPOREAL SHOCK WAVE LITHOTRIPSY (ESWL) Left 11/4/2024    Procedure: LEFT SHOCKWAVE LITHOTRIPSY;  Surgeon: Georgi Patel MD;  Location: [facility] OR;  Service: Urology;  Laterality: Left;    FEMUR FRACTURE SURGERY Left     FEMUR IM NAILING/RODDING Left 3/16/2019    Procedure: Fixation of periprosthetic femur fracture;  Surgeon: Lauro Friedman MD;  Location: [facility] OR;  Service: Orthopedics    LUMBAR EPIDURAL INJECTION N/A 10/27/2021    Procedure: LUMBAR EPIDURAL 1ST VISIT Lumbar 5-sacral 1;  Surgeon: Brittani Fleming MD;  Location: [facility];  Service: Pain Management;  Laterality: N/A;    LUMBAR EPIDURAL INJECTION N/A 12/13/2021    Procedure: lumbar epidural steroid injection;  Surgeon: Brittani Fleming MD;  Location: [facility];  Service: Pain Management;  Laterality: N/A;    MASTECTOMY         Family History   Problem Relation Age of Onset    No Known Problems Mother     No Known Problems Father     Malig Hyperthermia Neg Hx        Social History     Socioeconomic History    Marital status:    Tobacco Use    Smoking status: Never     Passive exposure: Never    Smokeless tobacco: Never    Tobacco comments:     4 cups of coffee in the AM   Vaping Use    Vaping status: Never Used   Substance and Sexual Activity    Alcohol use: No    Drug use: No    Sexual activity: Defer       Review of Systems   Constitutional:  Negative for fever.   Respiratory:  Negative for shortness of breath.        Objective   Visit Vitals  /68 (BP  Location: Right arm, Patient Position: Sitting, Cuff Size: Adult)   Pulse 50   Temp 97.4 °F (36.3 °C) (Temporal)   Wt 68.3 kg (150 lb 9.6 oz)   SpO2 100%   BMI 23.59 kg/m²     Body mass index is 23.59 kg/m².  Physical Exam  Constitutional:       Appearance: Normal appearance. She is well-developed.   Cardiovascular:      Rate and Rhythm: Normal rate and regular rhythm.      Heart sounds: Normal heart sounds.   Pulmonary:      Effort: Pulmonary effort is normal.      Breath sounds: Normal breath sounds.   Musculoskeletal:         General: No swelling. Normal range of motion.   Skin:     General: Skin is warm and dry.      Findings: No rash.   Neurological:      General: No focal deficit present.      Mental Status: She is alert and oriented to person, place, and time.   Psychiatric:         Mood and Affect: Mood normal.         Behavior: Behavior normal.           Assessment & Plan   Diagnoses and all orders for this visit:    1. Left ureteral stone (Primary)    2. Acute pyelonephritis    3. Abnormal CT scan, pelvis  -     US Pelvis Transvaginal Non OB; Future                   Cont f/u with urology. Discussed importance of U/S.

## 2024-11-20 ENCOUNTER — HOSPITAL ENCOUNTER (OUTPATIENT)
Facility: HOSPITAL | Age: 89
Setting detail: HOSPITAL OUTPATIENT SURGERY
Discharge: HOME | End: 2024-11-20
Attending: UROLOGY | Admitting: UROLOGY
Payer: MEDICARE

## 2024-11-20 ENCOUNTER — ANESTHESIA EVENT (OUTPATIENT)
Dept: PERIOP | Facility: HOSPITAL | Age: 89
End: 2024-11-20
Payer: MEDICARE

## 2024-11-20 ENCOUNTER — ANESTHESIA (OUTPATIENT)
Dept: PERIOP | Facility: HOSPITAL | Age: 89
End: 2024-11-20
Payer: MEDICARE

## 2024-11-20 VITALS
TEMPERATURE: 98.1 F | HEIGHT: 67 IN | BODY MASS INDEX: 23.64 KG/M2 | DIASTOLIC BLOOD PRESSURE: 70 MMHG | OXYGEN SATURATION: 100 % | HEART RATE: 78 BPM | WEIGHT: 150.6 LBS | RESPIRATION RATE: 16 BRPM | SYSTOLIC BLOOD PRESSURE: 148 MMHG

## 2024-11-20 DIAGNOSIS — N20.1 LEFT URETERAL STONE: Primary | ICD-10-CM

## 2024-11-20 PROCEDURE — 25010000002 DEXAMETHASONE SODIUM PHOSPHATE 20 MG/5ML SOLUTION: Performed by: ANESTHESIOLOGY

## 2024-11-20 PROCEDURE — 25010000002 ONDANSETRON PER 1 MG: Performed by: ANESTHESIOLOGY

## 2024-11-20 PROCEDURE — C1758 CATHETER, URETERAL: HCPCS | Performed by: UROLOGY

## 2024-11-20 PROCEDURE — 25010000002 LIDOCAINE 2% SOLUTION: Performed by: ANESTHESIOLOGY

## 2024-11-20 PROCEDURE — 25010000002 PROPOFOL 200 MG/20ML EMULSION: Performed by: ANESTHESIOLOGY

## 2024-11-20 PROCEDURE — 25810000003 LACTATED RINGERS PER 1000 ML: Performed by: ANESTHESIOLOGY

## 2024-11-20 RX ORDER — IPRATROPIUM BROMIDE AND ALBUTEROL SULFATE 2.5; .5 MG/3ML; MG/3ML
3 SOLUTION RESPIRATORY (INHALATION) ONCE AS NEEDED
Status: DISCONTINUED | OUTPATIENT
Start: 2024-11-20 | End: 2024-11-20 | Stop reason: HOSPADM

## 2024-11-20 RX ORDER — LIDOCAINE HYDROCHLORIDE 10 MG/ML
0.5 INJECTION, SOLUTION INFILTRATION; PERINEURAL ONCE AS NEEDED
Status: DISCONTINUED | OUTPATIENT
Start: 2024-11-20 | End: 2024-11-20 | Stop reason: HOSPADM

## 2024-11-20 RX ORDER — HYDROMORPHONE HYDROCHLORIDE 1 MG/ML
0.25 INJECTION, SOLUTION INTRAMUSCULAR; INTRAVENOUS; SUBCUTANEOUS
Status: DISCONTINUED | OUTPATIENT
Start: 2024-11-20 | End: 2024-11-20 | Stop reason: HOSPADM

## 2024-11-20 RX ORDER — NALOXONE HCL 0.4 MG/ML
0.2 VIAL (ML) INJECTION AS NEEDED
Status: DISCONTINUED | OUTPATIENT
Start: 2024-11-20 | End: 2024-11-20 | Stop reason: HOSPADM

## 2024-11-20 RX ORDER — ONDANSETRON 2 MG/ML
4 INJECTION INTRAMUSCULAR; INTRAVENOUS ONCE AS NEEDED
Status: DISCONTINUED | OUTPATIENT
Start: 2024-11-20 | End: 2024-11-20 | Stop reason: HOSPADM

## 2024-11-20 RX ORDER — FENTANYL CITRATE 50 UG/ML
50 INJECTION, SOLUTION INTRAMUSCULAR; INTRAVENOUS ONCE AS NEEDED
Status: DISCONTINUED | OUTPATIENT
Start: 2024-11-20 | End: 2024-11-20 | Stop reason: HOSPADM

## 2024-11-20 RX ORDER — DEXAMETHASONE SODIUM PHOSPHATE 4 MG/ML
INJECTION, SOLUTION INTRA-ARTICULAR; INTRALESIONAL; INTRAMUSCULAR; INTRAVENOUS; SOFT TISSUE AS NEEDED
Status: DISCONTINUED | OUTPATIENT
Start: 2024-11-20 | End: 2024-11-20 | Stop reason: SURG

## 2024-11-20 RX ORDER — FLUMAZENIL 0.1 MG/ML
0.2 INJECTION INTRAVENOUS AS NEEDED
Status: DISCONTINUED | OUTPATIENT
Start: 2024-11-20 | End: 2024-11-20 | Stop reason: HOSPADM

## 2024-11-20 RX ORDER — FENTANYL CITRATE 50 UG/ML
25 INJECTION, SOLUTION INTRAMUSCULAR; INTRAVENOUS
Status: DISCONTINUED | OUTPATIENT
Start: 2024-11-20 | End: 2024-11-20 | Stop reason: HOSPADM

## 2024-11-20 RX ORDER — HYDROCODONE BITARTRATE AND ACETAMINOPHEN 7.5; 325 MG/1; MG/1
1 TABLET ORAL EVERY 4 HOURS PRN
Status: DISCONTINUED | OUTPATIENT
Start: 2024-11-20 | End: 2024-11-20 | Stop reason: HOSPADM

## 2024-11-20 RX ORDER — PROPOFOL 10 MG/ML
INJECTION, EMULSION INTRAVENOUS AS NEEDED
Status: DISCONTINUED | OUTPATIENT
Start: 2024-11-20 | End: 2024-11-20 | Stop reason: SURG

## 2024-11-20 RX ORDER — PROMETHAZINE HYDROCHLORIDE 25 MG/1
25 TABLET ORAL ONCE AS NEEDED
Status: DISCONTINUED | OUTPATIENT
Start: 2024-11-20 | End: 2024-11-20 | Stop reason: HOSPADM

## 2024-11-20 RX ORDER — OXYCODONE HYDROCHLORIDE 5 MG/1
5 CAPSULE ORAL EVERY 4 HOURS PRN
COMMUNITY

## 2024-11-20 RX ORDER — HYDROCODONE BITARTRATE AND ACETAMINOPHEN 5; 325 MG/1; MG/1
1 TABLET ORAL EVERY 6 HOURS PRN
Qty: 20 TABLET | Refills: 0 | Status: SHIPPED | OUTPATIENT
Start: 2024-11-20

## 2024-11-20 RX ORDER — PHENAZOPYRIDINE HYDROCHLORIDE 200 MG/1
200 TABLET, FILM COATED ORAL 3 TIMES DAILY PRN
COMMUNITY

## 2024-11-20 RX ORDER — CEPHALEXIN 500 MG/1
500 CAPSULE ORAL 3 TIMES DAILY
Qty: 21 CAPSULE | Refills: 0 | Status: SHIPPED | OUTPATIENT
Start: 2024-11-20 | End: 2024-11-27

## 2024-11-20 RX ORDER — PROMETHAZINE HYDROCHLORIDE 25 MG/1
25 SUPPOSITORY RECTAL ONCE AS NEEDED
Status: DISCONTINUED | OUTPATIENT
Start: 2024-11-20 | End: 2024-11-20 | Stop reason: HOSPADM

## 2024-11-20 RX ORDER — SODIUM CHLORIDE 0.9 % (FLUSH) 0.9 %
3-10 SYRINGE (ML) INJECTION AS NEEDED
Status: DISCONTINUED | OUTPATIENT
Start: 2024-11-20 | End: 2024-11-20 | Stop reason: HOSPADM

## 2024-11-20 RX ORDER — ONDANSETRON 2 MG/ML
INJECTION INTRAMUSCULAR; INTRAVENOUS AS NEEDED
Status: DISCONTINUED | OUTPATIENT
Start: 2024-11-20 | End: 2024-11-20 | Stop reason: SURG

## 2024-11-20 RX ORDER — SODIUM CHLORIDE, SODIUM LACTATE, POTASSIUM CHLORIDE, CALCIUM CHLORIDE 600; 310; 30; 20 MG/100ML; MG/100ML; MG/100ML; MG/100ML
9 INJECTION, SOLUTION INTRAVENOUS CONTINUOUS
Status: DISCONTINUED | OUTPATIENT
Start: 2024-11-20 | End: 2024-11-20 | Stop reason: HOSPADM

## 2024-11-20 RX ORDER — EPHEDRINE SULFATE 50 MG/ML
INJECTION, SOLUTION INTRAVENOUS AS NEEDED
Status: DISCONTINUED | OUTPATIENT
Start: 2024-11-20 | End: 2024-11-20 | Stop reason: SURG

## 2024-11-20 RX ORDER — LIDOCAINE HYDROCHLORIDE 20 MG/ML
INJECTION, SOLUTION INFILTRATION; PERINEURAL AS NEEDED
Status: DISCONTINUED | OUTPATIENT
Start: 2024-11-20 | End: 2024-11-20 | Stop reason: SURG

## 2024-11-20 RX ORDER — EPHEDRINE SULFATE 50 MG/ML
5 INJECTION, SOLUTION INTRAVENOUS ONCE AS NEEDED
Status: DISCONTINUED | OUTPATIENT
Start: 2024-11-20 | End: 2024-11-20 | Stop reason: HOSPADM

## 2024-11-20 RX ORDER — PHENYLEPHRINE HCL IN 0.9% NACL 1 MG/10 ML
SYRINGE (ML) INTRAVENOUS AS NEEDED
Status: DISCONTINUED | OUTPATIENT
Start: 2024-11-20 | End: 2024-11-20 | Stop reason: SURG

## 2024-11-20 RX ORDER — HYDRALAZINE HYDROCHLORIDE 20 MG/ML
5 INJECTION INTRAMUSCULAR; INTRAVENOUS
Status: DISCONTINUED | OUTPATIENT
Start: 2024-11-20 | End: 2024-11-20 | Stop reason: HOSPADM

## 2024-11-20 RX ORDER — FAMOTIDINE 10 MG/ML
20 INJECTION, SOLUTION INTRAVENOUS ONCE
Status: COMPLETED | OUTPATIENT
Start: 2024-11-20 | End: 2024-11-20

## 2024-11-20 RX ORDER — DIPHENHYDRAMINE HYDROCHLORIDE 50 MG/ML
12.5 INJECTION INTRAMUSCULAR; INTRAVENOUS
Status: DISCONTINUED | OUTPATIENT
Start: 2024-11-20 | End: 2024-11-20 | Stop reason: HOSPADM

## 2024-11-20 RX ORDER — HYDROCODONE BITARTRATE AND ACETAMINOPHEN 5; 325 MG/1; MG/1
1 TABLET ORAL ONCE AS NEEDED
Status: DISCONTINUED | OUTPATIENT
Start: 2024-11-20 | End: 2024-11-20 | Stop reason: HOSPADM

## 2024-11-20 RX ORDER — LABETALOL HYDROCHLORIDE 5 MG/ML
5 INJECTION, SOLUTION INTRAVENOUS
Status: DISCONTINUED | OUTPATIENT
Start: 2024-11-20 | End: 2024-11-20 | Stop reason: HOSPADM

## 2024-11-20 RX ORDER — SODIUM CHLORIDE 0.9 % (FLUSH) 0.9 %
3 SYRINGE (ML) INJECTION EVERY 12 HOURS SCHEDULED
Status: DISCONTINUED | OUTPATIENT
Start: 2024-11-20 | End: 2024-11-20 | Stop reason: HOSPADM

## 2024-11-20 RX ADMIN — FAMOTIDINE 20 MG: 10 INJECTION INTRAVENOUS at 16:50

## 2024-11-20 RX ADMIN — EPHEDRINE SULFATE 10 MG: 50 INJECTION INTRAVENOUS at 18:19

## 2024-11-20 RX ADMIN — PROPOFOL 120 MG: 10 INJECTION, EMULSION INTRAVENOUS at 17:55

## 2024-11-20 RX ADMIN — Medication 200 MCG: at 18:06

## 2024-11-20 RX ADMIN — ONDANSETRON 4 MG: 2 INJECTION INTRAMUSCULAR; INTRAVENOUS at 18:03

## 2024-11-20 RX ADMIN — Medication 200 MCG: at 18:14

## 2024-11-20 RX ADMIN — DEXAMETHASONE SODIUM PHOSPHATE 4 MG: 4 INJECTION, SOLUTION INTRAMUSCULAR; INTRAVENOUS at 18:01

## 2024-11-20 RX ADMIN — Medication 200 MCG: at 17:59

## 2024-11-20 RX ADMIN — LIDOCAINE HYDROCHLORIDE 40 MG: 20 INJECTION, SOLUTION INFILTRATION; PERINEURAL at 17:54

## 2024-11-20 RX ADMIN — SODIUM CHLORIDE, POTASSIUM CHLORIDE, SODIUM LACTATE AND CALCIUM CHLORIDE: 600; 310; 30; 20 INJECTION, SOLUTION INTRAVENOUS at 17:51

## 2024-11-20 NOTE — ANESTHESIA PROCEDURE NOTES
Airway  Urgency: elective    Date/Time: 11/20/2024 5:57 PM  Airway not difficult    General Information and Staff    Patient location during procedure: OR  Anesthesiologist: Shawn Nuñez MD    Indications and Patient Condition  Indications for airway management: airway protection    Preoxygenated: yes  Mask difficulty assessment: 1 - vent by mask    Final Airway Details  Final airway type: supraglottic airway      Successful airway: classic  Size 3     Number of attempts at approach: 1    Additional Comments  Pre oxygenated  Easy mask vent  Atraumatic lma placement  +etco2

## 2024-11-20 NOTE — ANESTHESIA PREPROCEDURE EVALUATION
Anesthesia Evaluation     NPO Solid Status: > 8 hours             Airway   Mallampati: II  Dental      Pulmonary    (+) asthma,  (-) sleep apnea, not a smoker    ROS comment: Negative patient screen for DARRYL    Cardiovascular     (+) hypertension      Neuro/Psych  GI/Hepatic/Renal/Endo    (+) hiatal hernia, renal disease- CRI    Musculoskeletal     Abdominal    Substance History      OB/GYN          Other                    Anesthesia Plan    ASA 3     general       Anesthetic plan, risks, benefits, and alternatives have been provided, discussed and informed consent has been obtained with: patient.    CODE STATUS:

## 2024-11-20 NOTE — OP NOTE
Operative Report     [facility]    Patient: Keyana Liz  Age:      89 y.o.  :     9/15/1933  Sex:      female    Medical Record:  [id number]    Date of Operation/Procedure:  2024    Pre-operative Diagnosis Code: Left ureteral calculus    Post-Op Diagnosis Codes:     * Ureteral calculus [N20.1]    Pre-operative Diagnosis Free Text:  Left ureteral calculus         Surgeons and Role:     * Maverick Park MD - Primary     Name of Operation/Procedure:  Procedure(s) and Anesthesia Type:     * LEFT URETERAL  SHOCKWAVE LITHOTRIPSY - General    Findings/Complications: Multiple fragments of left proximal ureter.  Incompletely fragmented.    Description of procedure:  The patient was taken to the lithotripsy suite and placed under general anesthesia in supine position on the [employer] SLX-F2 lithotripter table.  Biplanar fluoroscopic imaging was used to identify and target the long column of stones measuring about 15 to 20 mm in length along the left mid ureter adjacent to her indwelling stent.  ESWL was commenced using slow escalation of power and rate to a peak power level of 5 and a total number of 3000 shocks given.  Intermittent fluoroscopy to confirm proper stone targeting was used throughout the case.  ECG monitoring was performed throughout the case to assure no ventricular ectopy or waveform changes from the lithotripter.  Fair pulverization was observed.    Specimens: * No orders in the log *     Estimated Blood Loss: none    Stent: Stent left indwelling and not removed    Maverick Park MD  2024  18:29 EST

## 2024-11-20 NOTE — H&P
[facility] Surgical History and Physical    Patient Care Team:  Elizabeth Soto MD as PCP - General (Family Medicine)    Chief complaint flank pain    Subjective     Patient is a 89 y.o. female presents with left proximal stone who underwent staged ESWL and stent placement on 11/4/2024.  Follow-up x-ray shows some persistent fragment the proximal ureter.  She now presents for second stage ESWL.  Initial presentation was on 10/20/2024 she she presented with obstructing pyelonephritis and had an urgent stent placed    Review of Systems   Pertinent items are noted in HPI    Past Medical History:   Diagnosis Date    Acute bronchitis     Allergic rhinitis     Analgesic overuse headache     Arthritis     Benign essential hypertension     Benign paroxysmal positional vertigo     Cancer     Chronic renal insufficiency     Cluster headache     Constipation     Cough     Dehydration     Dizziness     Dysuria     Fall at home     Fatigue     Hypertension     Hypertension, uncontrolled     Hypertensive urgency     Increased frequency of urination     Kidney stones     Osteoporosis     Otitis media     Postmenopausal HRT (hormone replacement therapy)     Short-term memory loss     Shoulder pain, left     TACS (trigeminal autonomic cephalgias)     Urinary frequency     UTI symptoms     Vertigo     Vitamin B12 deficiency      Past Surgical History:   Procedure Laterality Date    CYSTOSCOPY W/ URETERAL STENT PLACEMENT N/A 10/20/2024    Procedure: CYSTOSCOPY URETERAL CATHETER/STENT INSERTION;  Surgeon: Maverick Park MD;  Location: [facility];  Service: Urology;  Laterality: N/A;    EXTRACORPOREAL SHOCK WAVE LITHOTRIPSY (ESWL) Left 11/4/2024    Procedure: LEFT SHOCKWAVE LITHOTRIPSY;  Surgeon: Georgi Patel MD;  Location: [facility];  Service: Urology;  Laterality: Left;    FEMUR FRACTURE SURGERY Left     FEMUR IM NAILING/RODDING Left 3/16/2019    Procedure: Fixation of periprosthetic femur fracture;   Surgeon: Lauro Friedman MD;  Location: [facility] MAIN OR;  Service: Orthopedics    LUMBAR EPIDURAL INJECTION N/A 10/27/2021    Procedure: LUMBAR EPIDURAL 1ST VISIT Lumbar 5-sacral 1;  Surgeon: Brittani Fleming MD;  Location: [facility] MAIN OR;  Service: Pain Management;  Laterality: N/A;    LUMBAR EPIDURAL INJECTION N/A 12/13/2021    Procedure: lumbar epidural steroid injection;  Surgeon: Brittani Fleming MD;  Location: [facility] MAIN OR;  Service: Pain Management;  Laterality: N/A;    MASTECTOMY       Family History   Problem Relation Age of Onset    No Known Problems Mother     No Known Problems Father     Malig Hyperthermia Neg Hx      Social History     Tobacco Use    Smoking status: Never     Passive exposure: Never    Smokeless tobacco: Never    Tobacco comments:     4 cups of coffee in the AM   Vaping Use    Vaping status: Never Used   Substance Use Topics    Alcohol use: No    Drug use: No       Meds:  Medications Prior to Admission   Medication Sig Dispense Refill Last Dose/Taking    albuterol sulfate  (90 Base) MCG/ACT inhaler Inhale 2 puffs Every 4 (Four) Hours As Needed for Shortness of Air or Wheezing (cough). 18 g 0 11/20/2024 at  7:30 AM    Calcium 600-200 MG-UNIT per tablet Take 1 tablet by mouth 2 (Two) Times a Day.   11/20/2024 at  7:30 AM    lisinopril (PRINIVIL,ZESTRIL) 20 MG tablet Take 1 tablet by mouth Daily. for blood pressure 90 tablet 3 11/20/2024 at  7:30 AM    Multiple Vitamins-Minerals (MULTIVITAMIN WITH MINERALS) tablet tablet Take 1 tablet by mouth Daily.   11/20/2024 at  7:30 AM    omeprazole (priLOSEC) 20 MG capsule Take 1 capsule by mouth Daily.   11/20/2024 at  7:30 AM    oxyCODONE (OXY-IR) 5 MG capsule Take 1 capsule by mouth Every 4 (Four) Hours As Needed for Moderate Pain.   11/20/2024    propranolol (INDERAL) 40 MG tablet Take 1 tablet by mouth 2 (Two) Times a Day. 180 tablet 1 11/20/2024 at  7:30 AM    amLODIPine (NORVASC) 5 MG tablet Take 1.5 tablets by mouth Daily. 135 tablet  "3     carBAMazepine (TEGretol) 100 MG chewable tablet Chew 1 tablet Daily As Needed (cluster headache). 30 tablet 0 More than a month    phenazopyridine (PYRIDIUM) 200 MG tablet Take 1 tablet by mouth 3 (Three) Times a Day As Needed for Bladder Spasms.          Allergies:  Cvs diuretic maximum strength [pamabrom]    Debilities:  None    Objective     Vital Signs  Temp:  [97.8 °F (36.6 °C)] 97.8 °F (36.6 °C)  Heart Rate:  [71] 71  Resp:  [16] 16  BP: (163)/(76) 163/76  No intake or output data in the 24 hours ending 11/20/24 1721  Flowsheet Rows      Flowsheet Row First Filed Value   Admission Height 170.2 cm (67\") Documented at 11/20/2024 1617   Admission Weight 68.3 kg (150 lb 9.6 oz) Documented at 11/20/2024 1617             Physical Exam:     General Appearance:     Alert, cooperative, NAD   HEENT:     No trauma, pupils reactive, hearing intact   Back:      No CVA tenderness   Lungs:      Respirations unlabored, no wheezing    Heart:     RRR, intact peripheral pulses   Abdomen:      Soft, NDNT, no masses, no guarding   :        Extremities:    No edema, no deformity   Lymphatic:    No neck or groin LAD   Skin:    No bleeding, bruising or rashes   Neuro/Psych:    Orientation intact, mood/affect pleasant, no focal findings     Results Review:     Results for orders placed or performed during the hospital encounter of 11/07/24   COVID-19 and FLU A/B PCR, 1 HR TAT - Swab, Nasopharynx    Collection Time: 11/07/24  9:22 AM    Specimen: Nasopharynx; Swab   Result Value Ref Range    COVID19 Not Detected Not Detected - Ref. Range    Influenza A PCR Not Detected Not Detected    Influenza B PCR Not Detected Not Detected       I reviewed the patient's prior history and old records to the best of my ability.   I have personally reviewed all pertinent imaging myself and their accompanying reports.   I have reviewed all labs.     Assessment:  Left proximal ureteral calculus with incomplete fragmentation and indwelling " stent    Plan:    Left staged extracorporeal shockwave lithotripsy with stent removal    I discussed the patient's findings and my recommendations with patient.   Risks, complications, outcomes and alternatives discussed with the patient at the bedside and office.    Maverick Park MD  11/20/24  17:21 EST

## 2024-11-21 NOTE — DISCHARGE INSTRUCTIONS
Instruct patient to strain her urine for stone fragments over the next 3 days and provide a urine strainer.              Avoid Straining to Lift    Length of Lifting Restriction For 24 hours

## 2024-11-21 NOTE — ANESTHESIA POSTPROCEDURE EVALUATION
"Patient: Keyana Liz    Procedure Summary       Date: 11/20/24 Room / Location: [facility] OR 09 / [facility] MAIN OR    Anesthesia Start: 1749 Anesthesia Stop: 1837    Procedure: LEFT URETERAL  SHOCKWAVE LITHOTRIPSY (Left) Diagnosis:       Ureteral calculus      (Left ureteral calculus)    Surgeons: Maverick Park MD Provider: Shawn Nuñez MD    Anesthesia Type: general ASA Status: 3            Anesthesia Type: general    Vitals  Vitals Value Taken Time   /81 11/20/24 1900   Temp 36.7 °C (98.1 °F) 11/20/24 1900   Pulse 76 11/20/24 1904   Resp 16 11/20/24 1900   SpO2 97 % 11/20/24 1904   Vitals shown include unfiled device data.        Post Anesthesia Care and Evaluation    Pain management: adequate    Airway patency: patent  Anesthetic complications: No anesthetic complications    Cardiovascular status: acceptable  Respiratory status: acceptable  Hydration status: acceptable    Comments: /70 (BP Location: Right arm, Patient Position: Lying)   Pulse 78   Temp 36.7 °C (98.1 °F) (Oral)   Resp 16   Ht 170.2 cm (67\")   Wt 68.3 kg (150 lb 9.6 oz)   SpO2 100%   BMI 23.59 kg/m²       "

## 2024-12-06 ENCOUNTER — HOSPITAL ENCOUNTER (OUTPATIENT)
Dept: ULTRASOUND IMAGING | Facility: HOSPITAL | Age: 89
Discharge: HOME | End: 2024-12-06
Payer: MEDICARE

## 2024-12-06 DIAGNOSIS — R93.5 ABNORMAL CT SCAN, PELVIS: ICD-10-CM

## 2024-12-06 PROCEDURE — 76830 TRANSVAGINAL US NON-OB: CPT

## 2024-12-06 PROCEDURE — 76856 US EXAM PELVIC COMPLETE: CPT

## 2024-12-12 ENCOUNTER — TELEPHONE (OUTPATIENT)
Dept: FAMILY MEDICINE CLINIC | Facility: CLINIC | Age: 89
End: 2024-12-12

## 2024-12-12 NOTE — TELEPHONE ENCOUNTER
Caller: Keyana Liz    Relationship to patient: Self    Best call back number:     Patient is needing: PATIENT STATES SHE HAD AN ULTRASOUND DONE ON 12/6 AND HAS NOT BEEN CONTACTED WITH THOSE RESULTS.  PLEASE CALL THE PATIENT WITH RESULTS.

## 2024-12-12 NOTE — TELEPHONE ENCOUNTER
Under Imaging there appears to be an us scanned from 12/5/24-not sure that Dr. Soto ordered. Please advise    [facility]

## 2024-12-13 DIAGNOSIS — R93.5 ABNORMAL US (ULTRASOUND) OF ABDOMEN: Primary | ICD-10-CM

## 2024-12-18 RX ORDER — ACETAMINOPHEN 500 MG
500 TABLET ORAL EVERY 6 HOURS PRN
COMMUNITY
End: 2024-12-25 | Stop reason: HOSPADM

## 2024-12-18 RX ORDER — FLUTICASONE PROPIONATE 50 MCG
2 SPRAY, SUSPENSION (ML) NASAL DAILY PRN
COMMUNITY
Start: 2024-02-01

## 2024-12-19 ENCOUNTER — ANESTHESIA (OUTPATIENT)
Dept: PERIOP | Facility: HOSPITAL | Age: 89
End: 2024-12-19
Payer: MEDICARE

## 2024-12-19 ENCOUNTER — HOSPITAL ENCOUNTER (OUTPATIENT)
Facility: HOSPITAL | Age: 89
Setting detail: HOSPITAL OUTPATIENT SURGERY
Discharge: HOME | DRG: 660 | End: 2024-12-19
Attending: UROLOGY | Admitting: UROLOGY
Payer: MEDICARE

## 2024-12-19 ENCOUNTER — ANESTHESIA EVENT (OUTPATIENT)
Dept: PERIOP | Facility: HOSPITAL | Age: 89
End: 2024-12-19
Payer: MEDICARE

## 2024-12-19 ENCOUNTER — APPOINTMENT (OUTPATIENT)
Dept: GENERAL RADIOLOGY | Facility: HOSPITAL | Age: 89
DRG: 660 | End: 2024-12-19
Payer: MEDICARE

## 2024-12-19 VITALS
TEMPERATURE: 97.9 F | DIASTOLIC BLOOD PRESSURE: 68 MMHG | RESPIRATION RATE: 20 BRPM | SYSTOLIC BLOOD PRESSURE: 182 MMHG | OXYGEN SATURATION: 97 % | HEART RATE: 80 BPM

## 2024-12-19 DIAGNOSIS — N20.1 LEFT URETERAL STONE: Primary | ICD-10-CM

## 2024-12-19 DIAGNOSIS — N20.1 URETERAL STONE: ICD-10-CM

## 2024-12-19 PROCEDURE — 25810000003 LACTATED RINGERS PER 1000 ML: Performed by: ANESTHESIOLOGY

## 2024-12-19 PROCEDURE — 76000 FLUOROSCOPY <1 HR PHYS/QHP: CPT

## 2024-12-19 PROCEDURE — 25010000002 LIDOCAINE 2% SOLUTION: Performed by: ANESTHESIOLOGY

## 2024-12-19 PROCEDURE — 25010000002 CEFAZOLIN PER 500 MG: Performed by: UROLOGY

## 2024-12-19 PROCEDURE — 25010000002 FENTANYL CITRATE (PF) 50 MCG/ML SOLUTION: Performed by: ANESTHESIOLOGY

## 2024-12-19 PROCEDURE — 0TC78ZZ EXTIRPATION OF MATTER FROM LEFT URETER, VIA NATURAL OR ARTIFICIAL OPENING ENDOSCOPIC: ICD-10-PCS | Performed by: UROLOGY

## 2024-12-19 PROCEDURE — 82365 CALCULUS SPECTROSCOPY: CPT | Performed by: UROLOGY

## 2024-12-19 PROCEDURE — 25010000002 PROPOFOL 10 MG/ML EMULSION: Performed by: ANESTHESIOLOGY

## 2024-12-19 PROCEDURE — C2617 STENT, NON-COR, TEM W/O DEL: HCPCS | Performed by: UROLOGY

## 2024-12-19 PROCEDURE — 0T778DZ DILATION OF LEFT URETER WITH INTRALUMINAL DEVICE, VIA NATURAL OR ARTIFICIAL OPENING ENDOSCOPIC: ICD-10-PCS | Performed by: UROLOGY

## 2024-12-19 DEVICE — URETERAL STENT
Type: IMPLANTABLE DEVICE | Site: URETER | Status: FUNCTIONAL
Brand: CONTOUR™

## 2024-12-19 RX ORDER — LIDOCAINE HYDROCHLORIDE 20 MG/ML
INJECTION, SOLUTION INFILTRATION; PERINEURAL AS NEEDED
Status: DISCONTINUED | OUTPATIENT
Start: 2024-12-19 | End: 2024-12-19 | Stop reason: SURG

## 2024-12-19 RX ORDER — PROPOFOL 10 MG/ML
VIAL (ML) INTRAVENOUS AS NEEDED
Status: DISCONTINUED | OUTPATIENT
Start: 2024-12-19 | End: 2024-12-19 | Stop reason: SURG

## 2024-12-19 RX ORDER — NALOXONE HCL 0.4 MG/ML
0.2 VIAL (ML) INJECTION AS NEEDED
Status: DISCONTINUED | OUTPATIENT
Start: 2024-12-19 | End: 2024-12-19 | Stop reason: HOSPADM

## 2024-12-19 RX ORDER — EPHEDRINE SULFATE 50 MG/ML
5 INJECTION, SOLUTION INTRAVENOUS ONCE AS NEEDED
Status: DISCONTINUED | OUTPATIENT
Start: 2024-12-19 | End: 2024-12-19 | Stop reason: HOSPADM

## 2024-12-19 RX ORDER — ONDANSETRON 2 MG/ML
4 INJECTION INTRAMUSCULAR; INTRAVENOUS ONCE AS NEEDED
Status: DISCONTINUED | OUTPATIENT
Start: 2024-12-19 | End: 2024-12-19 | Stop reason: HOSPADM

## 2024-12-19 RX ORDER — FENTANYL CITRATE 50 UG/ML
25 INJECTION, SOLUTION INTRAMUSCULAR; INTRAVENOUS
Status: DISCONTINUED | OUTPATIENT
Start: 2024-12-19 | End: 2024-12-19 | Stop reason: HOSPADM

## 2024-12-19 RX ORDER — HYDRALAZINE HYDROCHLORIDE 20 MG/ML
5 INJECTION INTRAMUSCULAR; INTRAVENOUS
Status: DISCONTINUED | OUTPATIENT
Start: 2024-12-19 | End: 2024-12-19 | Stop reason: HOSPADM

## 2024-12-19 RX ORDER — LABETALOL HYDROCHLORIDE 5 MG/ML
5 INJECTION, SOLUTION INTRAVENOUS
Status: DISCONTINUED | OUTPATIENT
Start: 2024-12-19 | End: 2024-12-19 | Stop reason: HOSPADM

## 2024-12-19 RX ORDER — SODIUM CHLORIDE, SODIUM LACTATE, POTASSIUM CHLORIDE, CALCIUM CHLORIDE 600; 310; 30; 20 MG/100ML; MG/100ML; MG/100ML; MG/100ML
INJECTION, SOLUTION INTRAVENOUS CONTINUOUS PRN
Status: DISCONTINUED | OUTPATIENT
Start: 2024-12-19 | End: 2024-12-19 | Stop reason: SURG

## 2024-12-19 RX ORDER — PROMETHAZINE HYDROCHLORIDE 25 MG/1
25 TABLET ORAL ONCE AS NEEDED
Status: DISCONTINUED | OUTPATIENT
Start: 2024-12-19 | End: 2024-12-19 | Stop reason: HOSPADM

## 2024-12-19 RX ORDER — SODIUM CHLORIDE 0.9 % (FLUSH) 0.9 %
3-10 SYRINGE (ML) INJECTION AS NEEDED
Status: DISCONTINUED | OUTPATIENT
Start: 2024-12-19 | End: 2024-12-19 | Stop reason: HOSPADM

## 2024-12-19 RX ORDER — HYDROCODONE BITARTRATE AND ACETAMINOPHEN 5; 325 MG/1; MG/1
1 TABLET ORAL ONCE AS NEEDED
Status: DISCONTINUED | OUTPATIENT
Start: 2024-12-19 | End: 2024-12-19 | Stop reason: HOSPADM

## 2024-12-19 RX ORDER — SODIUM CHLORIDE 0.9 % (FLUSH) 0.9 %
3 SYRINGE (ML) INJECTION EVERY 12 HOURS SCHEDULED
Status: DISCONTINUED | OUTPATIENT
Start: 2024-12-19 | End: 2024-12-19 | Stop reason: HOSPADM

## 2024-12-19 RX ORDER — CEPHALEXIN 500 MG/1
500 CAPSULE ORAL 2 TIMES DAILY
Qty: 28 CAPSULE | Refills: 0 | Status: SHIPPED | OUTPATIENT
Start: 2024-12-19 | End: 2024-12-25 | Stop reason: HOSPADM

## 2024-12-19 RX ORDER — ATROPINE SULFATE 0.4 MG/ML
0.4 INJECTION, SOLUTION INTRAMUSCULAR; INTRAVENOUS; SUBCUTANEOUS ONCE AS NEEDED
Status: DISCONTINUED | OUTPATIENT
Start: 2024-12-19 | End: 2024-12-19 | Stop reason: HOSPADM

## 2024-12-19 RX ORDER — IPRATROPIUM BROMIDE AND ALBUTEROL SULFATE 2.5; .5 MG/3ML; MG/3ML
3 SOLUTION RESPIRATORY (INHALATION) ONCE AS NEEDED
Status: DISCONTINUED | OUTPATIENT
Start: 2024-12-19 | End: 2024-12-19 | Stop reason: HOSPADM

## 2024-12-19 RX ORDER — DIPHENHYDRAMINE HYDROCHLORIDE 50 MG/ML
12.5 INJECTION INTRAMUSCULAR; INTRAVENOUS
Status: DISCONTINUED | OUTPATIENT
Start: 2024-12-19 | End: 2024-12-19 | Stop reason: HOSPADM

## 2024-12-19 RX ORDER — PROMETHAZINE HYDROCHLORIDE 25 MG/1
25 SUPPOSITORY RECTAL ONCE AS NEEDED
Status: DISCONTINUED | OUTPATIENT
Start: 2024-12-19 | End: 2024-12-19 | Stop reason: HOSPADM

## 2024-12-19 RX ORDER — HYDROCODONE BITARTRATE AND ACETAMINOPHEN 7.5; 325 MG/1; MG/1
1 TABLET ORAL EVERY 4 HOURS PRN
Status: DISCONTINUED | OUTPATIENT
Start: 2024-12-19 | End: 2024-12-19 | Stop reason: HOSPADM

## 2024-12-19 RX ORDER — FLUMAZENIL 0.1 MG/ML
0.2 INJECTION INTRAVENOUS AS NEEDED
Status: DISCONTINUED | OUTPATIENT
Start: 2024-12-19 | End: 2024-12-19 | Stop reason: HOSPADM

## 2024-12-19 RX ORDER — HYDROMORPHONE HYDROCHLORIDE 1 MG/ML
0.25 INJECTION, SOLUTION INTRAMUSCULAR; INTRAVENOUS; SUBCUTANEOUS
Status: DISCONTINUED | OUTPATIENT
Start: 2024-12-19 | End: 2024-12-19 | Stop reason: HOSPADM

## 2024-12-19 RX ORDER — SODIUM CHLORIDE, SODIUM LACTATE, POTASSIUM CHLORIDE, CALCIUM CHLORIDE 600; 310; 30; 20 MG/100ML; MG/100ML; MG/100ML; MG/100ML
9 INJECTION, SOLUTION INTRAVENOUS CONTINUOUS
Status: DISCONTINUED | OUTPATIENT
Start: 2024-12-19 | End: 2024-12-19 | Stop reason: HOSPADM

## 2024-12-19 RX ORDER — LIDOCAINE HYDROCHLORIDE 10 MG/ML
0.5 INJECTION, SOLUTION INFILTRATION; PERINEURAL ONCE AS NEEDED
Status: DISCONTINUED | OUTPATIENT
Start: 2024-12-19 | End: 2024-12-19 | Stop reason: HOSPADM

## 2024-12-19 RX ADMIN — SODIUM CHLORIDE 2000 MG: 900 INJECTION INTRAVENOUS at 18:09

## 2024-12-19 RX ADMIN — PROPOFOL 100 MG: 10 INJECTION, EMULSION INTRAVENOUS at 18:03

## 2024-12-19 RX ADMIN — FENTANYL CITRATE 25 MCG: 50 INJECTION, SOLUTION INTRAMUSCULAR; INTRAVENOUS at 19:01

## 2024-12-19 RX ADMIN — LIDOCAINE HYDROCHLORIDE 100 MG: 20 INJECTION, SOLUTION INFILTRATION; PERINEURAL at 18:03

## 2024-12-19 RX ADMIN — HYDROCODONE BITARTRATE AND ACETAMINOPHEN 1 TABLET: 7.5; 325 TABLET ORAL at 19:15

## 2024-12-19 RX ADMIN — SODIUM CHLORIDE, POTASSIUM CHLORIDE, SODIUM LACTATE AND CALCIUM CHLORIDE: 600; 310; 30; 20 INJECTION, SOLUTION INTRAVENOUS at 18:00

## 2024-12-19 RX ADMIN — FENTANYL CITRATE 25 MCG: 50 INJECTION, SOLUTION INTRAMUSCULAR; INTRAVENOUS at 18:53

## 2024-12-19 NOTE — OP NOTE
URETEROSCOPY LASER LITHOTRIPSY WITH STENT INSERTION  Procedure Note    Keyana Liz  12/19/2024    Pre-op Diagnosis:   Ureteral calculi    Post-op Diagnosis:     Post-Op Diagnosis Codes:     * Ureteral calculi [N20.1]    Procedure(s):  LEFT URETEROSCOPY LASER LITHOTRIPSY WITH STENT REMOVAL AND REPLACEMENT    Surgeon(s):  Maverick Park MD    Anesthesia: General    Staff:   Circulator: Mihaela Davis RN  Laser Staff: Jannet Morocho RN  Radiology Technologist: Sony Blake  Scrnereyda Person: Kristian Tinajero    Estimated Blood Loss: minimal    Specimens:                Order Name Source Comment Collection Info Order Time   STONE ANALYSIS Ureter, Left  Collected By: Maverick Park MD 12/19/2024  6:27 PM     Release to patient   Routine Release              Drains:   Ureteral Drain/Stent Left ureter 6 Fr. (Active)   Site Assessment JESSICA 11/20/24 1930       Findings: Multiple stones largely fragments in the left proximal ureter.  Broken down with laser and all fragments removed.  Nothing residual seen on imaging or direct visualization at the end the case.  Stent replaced just because of trauma of the ureter from removing the stones.    Complications: None.    Indications: 89-year-old female left proximal ureteral calculi had stent placement ESWL has persistent fragments she now presents for left ureteroscopy laser lithotripsy and removal of her remaining fragments.    Procedure: Patient was taken the operative suite after informed sent was obtained.  Given general anesthesia.  Placed lithotomy.  Prepped and draped in sterile fashion.  Surgical timeout was performed.  Cystoscope was inserted.  The bladder stone visualized.  The stent was visualized pulled out and guidewire passed up.  Rigid ureteroscopy was performed.  The distal and mid ureteral segments were all clear.  Once I got to the proximal ureter I found several stone fragments impacted with one another  alongside the stent.  A 242 µm holmium fiber was passed and the stones are breaking down into pieces which then it pulled out with a nitinol 0 tip basket.  The ureter was a little beat up but there was no tear in the ureter that I still felt a little comfortable not putting stent in.  I looked back the ureter again 1 more time all the way into the pelvis could not appreciate other stones visually and I saw nothing on fluoroscopy.  The cystoscope was replaced back over the wire.  A 7 Polish by 24 cm stent was then placed the left collecting system.  She was taken recovery in stable condition.  Will leave this in over the holidays and take it out in the office in a couple weeks.  Plan to discharge home tonight.      Maverick Park MD     Date: 12/19/2024  Time: 18:38 EST

## 2024-12-19 NOTE — ANESTHESIA PROCEDURE NOTES
Airway  Urgency: elective    Date/Time: 12/19/2024 6:06 PM  End Time:12/19/2024 6:06 PM  Airway not difficult    General Information and Staff    Patient location during procedure: OR  Anesthesiologist: Maverick Sloan MD    Indications and Patient Condition  Indications for airway management: airway protection    Preoxygenated: yes  Mask difficulty assessment: 0 - not attempted    Final Airway Details  Final airway type: supraglottic airway      Successful airway: classic  Size 4     Number of attempts at approach: 1

## 2024-12-19 NOTE — ANESTHESIA PREPROCEDURE EVALUATION
Anesthesia Evaluation     Patient summary reviewed and Nursing notes reviewed   no history of anesthetic complications:   NPO Solid Status: > 8 hours  NPO Liquid Status: > 2 hours           Airway   Mallampati: II  TM distance: >3 FB  Neck ROM: full  Dental      Pulmonary    (+) ,sleep apnea  Cardiovascular     ECG reviewed    (+) hypertension    ROS comment: Echo reviewed    Neuro/Psych  GI/Hepatic/Renal/Endo    (+) renal disease- CRI and stones    Musculoskeletal     Abdominal    Substance History      OB/GYN          Other   arthritis,                       Anesthesia Plan    ASA 3     general     intravenous induction     Anesthetic plan, risks, benefits, and alternatives have been provided, discussed and informed consent has been obtained with: patient.        CODE STATUS:

## 2024-12-19 NOTE — H&P
[facility] Surgical History and Physical    Patient Care Team:  Elizabeth Soto MD as PCP - General (Family Medicine)    Chief complaint flank pain    Subjective     Patient is a 89 y.o. female presents with indwelling stent with left ureteral calculi.  She has fragments remaining from a left ureteral stone which shockwave lithotripsy was performed.  She now presents for staged ureteroscopy laser lithotripsy possible stent replacement.     Review of Systems   Pertinent items are noted in HPI    Past Medical History:   Diagnosis Date    Acute bronchitis     Allergic rhinitis     Analgesic overuse headache     Arthritis     Benign essential hypertension     Benign paroxysmal positional vertigo     Cancer     Chronic renal insufficiency     Cluster headache     Constipation     Cough     Dehydration     Dizziness     Dysuria     Fall at home     Fatigue     Hypertension     Hypertension, uncontrolled     Hypertensive urgency     Increased frequency of urination     Kidney stones     Osteoporosis     Otitis media     Postmenopausal HRT (hormone replacement therapy)     Short-term memory loss     Shoulder pain, left     Sleep apnea     no machine    TACS (trigeminal autonomic cephalgias)     Urinary frequency     UTI symptoms     Vertigo     Vitamin B12 deficiency      Past Surgical History:   Procedure Laterality Date    CYSTOSCOPY W/ URETERAL STENT PLACEMENT N/A 10/20/2024    Procedure: CYSTOSCOPY URETERAL CATHETER/STENT INSERTION;  Surgeon: Maverick Park MD;  Location: [facility];  Service: Urology;  Laterality: N/A;    EXTRACORPOREAL SHOCK WAVE LITHOTRIPSY (ESWL) Left 11/4/2024    Procedure: LEFT SHOCKWAVE LITHOTRIPSY;  Surgeon: Georgi Patel MD;  Location: [facility];  Service: Urology;  Laterality: Left;    EXTRACORPOREAL SHOCK WAVE LITHOTRIPSY (ESWL) Left 11/20/2024    Procedure: LEFT URETERAL  SHOCKWAVE LITHOTRIPSY;  Surgeon: Maverick Park MD;  Location: [facility];   Service: Urology;  Laterality: Left;    FEMUR FRACTURE SURGERY Left     FEMUR IM NAILING/RODDING Left 3/16/2019    Procedure: Fixation of periprosthetic femur fracture;  Surgeon: Lauro Friedman MD;  Location: [facility];  Service: Orthopedics    LUMBAR EPIDURAL INJECTION N/A 10/27/2021    Procedure: LUMBAR EPIDURAL 1ST VISIT Lumbar 5-sacral 1;  Surgeon: Brittani Fleimng MD;  Location: [facility];  Service: Pain Management;  Laterality: N/A;    LUMBAR EPIDURAL INJECTION N/A 12/13/2021    Procedure: lumbar epidural steroid injection;  Surgeon: Brittani Fleming MD;  Location: [facility];  Service: Pain Management;  Laterality: N/A;    MASTECTOMY       Family History   Problem Relation Age of Onset    No Known Problems Mother     No Known Problems Father     Malig Hyperthermia Neg Hx      Social History     Tobacco Use    Smoking status: Never     Passive exposure: Never    Smokeless tobacco: Never    Tobacco comments:     4 cups of coffee in the AM   Vaping Use    Vaping status: Never Used   Substance Use Topics    Alcohol use: No    Drug use: No       Meds:  Medications Prior to Admission   Medication Sig Dispense Refill Last Dose/Taking    acetaminophen (TYLENOL) 500 MG tablet Take 1 tablet by mouth Every 6 (Six) Hours As Needed for Mild Pain.   Past Month    albuterol sulfate  (90 Base) MCG/ACT inhaler Inhale 2 puffs Every 4 (Four) Hours As Needed for Shortness of Air or Wheezing (cough). 18 g 0 12/19/2024 Morning    amLODIPine (NORVASC) 5 MG tablet Take 1.5 tablets by mouth Daily. 135 tablet 3 12/19/2024 at  6:00 AM    Calcium 600-200 MG-UNIT per tablet Take 1 tablet by mouth 2 (Two) Times a Day.   12/19/2024 Morning    fluticasone (FLONASE) 50 MCG/ACT nasal spray Administer 2 sprays into the nostril(s) as directed by provider Daily.   12/19/2024 Morning    HYDROcodone-acetaminophen (NORCO) 5-325 MG per tablet Take 1 tablet by mouth Every 6 (Six) Hours As Needed for Moderate Pain. 20 tablet 0  Taking As Needed    lisinopril (PRINIVIL,ZESTRIL) 20 MG tablet Take 1 tablet by mouth Daily. for blood pressure 90 tablet 3 12/18/2024 at  6:00 AM    Multiple Vitamins-Minerals (MULTIVITAMIN WITH MINERALS) tablet tablet Take 1 tablet by mouth Daily.   12/19/2024 Morning    omeprazole (priLOSEC) 20 MG capsule Take 1 capsule by mouth Daily.   12/19/2024 Morning    oxyCODONE (OXY-IR) 5 MG capsule Take 1 capsule by mouth Every 4 (Four) Hours As Needed for Moderate Pain.   Taking As Needed    phenazopyridine (PYRIDIUM) 200 MG tablet Take 1 tablet by mouth 3 (Three) Times a Day As Needed for Bladder Spasms.   Past Month    propranolol (INDERAL) 40 MG tablet Take 1 tablet by mouth 2 (Two) Times a Day. 180 tablet 1 12/19/2024 at  6:00 AM    carBAMazepine (TEGretol) 100 MG chewable tablet Chew 1 tablet Daily As Needed (cluster headache). 30 tablet 0 More than a month       Allergies:  Cvs diuretic maximum strength [pamabrom]    Debilities:  None    Objective     Vital Signs  Temp:  [98 °F (36.7 °C)] 98 °F (36.7 °C)  Heart Rate:  [69] 69  Resp:  [16] 16  BP: (165)/(67) 165/67  No intake or output data in the 24 hours ending 12/19/24 7616       Physical Exam:     General Appearance:     Alert, cooperative, NAD   HEENT:     No trauma, pupils reactive, hearing intact   Back:      No CVA tenderness   Lungs:      Respirations unlabored, no wheezing    Heart:     RRR, intact peripheral pulses   Abdomen:      Soft, NDNT, no masses, no guarding   :     External genitalia normal   Extremities:    No edema, no deformity   Lymphatic:    No neck or groin LAD   Skin:    No bleeding, bruising or rashes   Neuro/Psych:    Orientation intact, mood/affect pleasant, no focal findings     Results Review:     Results for orders placed or performed during the hospital encounter of 11/07/24   COVID-19 and FLU A/B PCR, 1 HR TAT - Swab, Nasopharynx    Collection Time: 11/07/24  9:22 AM    Specimen: Nasopharynx; Swab   Result Value Ref Range     COVID19 Not Detected Not Detected - Ref. Range    Influenza A PCR Not Detected Not Detected    Influenza B PCR Not Detected Not Detected       I reviewed the patient's prior history and old records to the best of my ability.   I have personally reviewed all pertinent imaging myself and their accompanying reports.   I have reviewed all labs.     Assessment:  Left ureteral calculi    Plan:    Left ureteroscopy laser lithotripsy possible stent replacement    I discussed the patient's findings and my recommendations with patient.   Risks, complications, outcomes and alternatives discussed with the patient at the bedside and office.    Maverick Park MD  12/19/24  17:36 EST

## 2024-12-20 NOTE — ANESTHESIA POSTPROCEDURE EVALUATION
Patient: Keyana Liz    Procedure Summary       Date: 12/19/24 Room / Location: [facility] OR  / [facility] MAIN OR    Anesthesia Start: 1800 Anesthesia Stop: 1841    Procedure: LEFT URETEROSCOPY LASER LITHOTRIPSY WITH STENT REMOVAL AND REPLACEMENT (Left) Diagnosis:       Ureteral calculi      (Ureteral calculi)    Surgeons: Maverick Park MD Provider: Maverick Sloan MD    Anesthesia Type: general ASA Status: 3            Anesthesia Type: general    Vitals  Vitals Value Taken Time   /76 12/19/24 1925   Temp 36.6 °C (97.9 °F) 12/19/24 1837   Pulse 80 12/19/24 1924   Resp 18 12/19/24 1900   SpO2 99 % 12/19/24 1924   Vitals shown include unfiled device data.        Post Anesthesia Care and Evaluation    Patient location: did not personally evaluate patient.    Comments: Discharge criteria met per RN

## 2024-12-21 ENCOUNTER — APPOINTMENT (OUTPATIENT)
Dept: GENERAL RADIOLOGY | Facility: HOSPITAL | Age: 89
End: 2024-12-21
Payer: MEDICARE

## 2024-12-21 ENCOUNTER — HOSPITAL ENCOUNTER (INPATIENT)
Facility: HOSPITAL | Age: 89
LOS: 3 days | Discharge: NURSING, REHAB OR LONG-TERM CARE | End: 2024-12-25
Attending: STUDENT IN AN ORGANIZED HEALTH CARE EDUCATION/TRAINING PROGRAM | Admitting: INTERNAL MEDICINE
Payer: MEDICARE

## 2024-12-21 ENCOUNTER — APPOINTMENT (OUTPATIENT)
Dept: CT IMAGING | Facility: HOSPITAL | Age: 89
End: 2024-12-21
Payer: MEDICARE

## 2024-12-21 DIAGNOSIS — I10 BENIGN ESSENTIAL HYPERTENSION: ICD-10-CM

## 2024-12-21 DIAGNOSIS — M25.562 ACUTE PAIN OF LEFT KNEE: ICD-10-CM

## 2024-12-21 DIAGNOSIS — N17.9 AKI (ACUTE KIDNEY INJURY): ICD-10-CM

## 2024-12-21 DIAGNOSIS — D72.829 LEUKOCYTOSIS, UNSPECIFIED TYPE: ICD-10-CM

## 2024-12-21 DIAGNOSIS — R53.1 WEAKNESS: Primary | ICD-10-CM

## 2024-12-21 DIAGNOSIS — N20.1 LEFT URETERAL STONE: ICD-10-CM

## 2024-12-21 LAB
ALBUMIN SERPL-MCNC: 3.5 G/DL (ref 3.5–5.2)
ALBUMIN/GLOB SERPL: 1.2 G/DL
ALP SERPL-CCNC: 53 U/L (ref 39–117)
ALT SERPL W P-5'-P-CCNC: 7 U/L (ref 1–33)
ANION GAP SERPL CALCULATED.3IONS-SCNC: 11 MMOL/L (ref 5–15)
AST SERPL-CCNC: 25 U/L (ref 1–32)
BACTERIA UR QL AUTO: ABNORMAL /HPF
BASOPHILS # BLD AUTO: 0.05 10*3/MM3 (ref 0–0.2)
BASOPHILS NFR BLD AUTO: 0.2 % (ref 0–1.5)
BILIRUB SERPL-MCNC: 0.4 MG/DL (ref 0–1.2)
BILIRUB UR QL STRIP: NEGATIVE
BUN SERPL-MCNC: 42 MG/DL (ref 8–23)
BUN/CREAT SERPL: 23.5 (ref 7–25)
CALCIUM SPEC-SCNC: 9 MG/DL (ref 8.2–9.6)
CARBAMAZEPINE SERPL-MCNC: <2 MCG/ML (ref 4–12)
CHLORIDE SERPL-SCNC: 94 MMOL/L (ref 98–107)
CLARITY UR: ABNORMAL
CO2 SERPL-SCNC: 25 MMOL/L (ref 22–29)
COLOR UR: YELLOW
CREAT SERPL-MCNC: 1.79 MG/DL (ref 0.57–1)
DEPRECATED RDW RBC AUTO: 39.7 FL (ref 37–54)
EGFRCR SERPLBLD CKD-EPI 2021: 26.5 ML/MIN/1.73
EOSINOPHIL # BLD AUTO: 0.01 10*3/MM3 (ref 0–0.4)
EOSINOPHIL NFR BLD AUTO: 0 % (ref 0.3–6.2)
ERYTHROCYTE [DISTWIDTH] IN BLOOD BY AUTOMATED COUNT: 12.4 % (ref 12.3–15.4)
GEN 5 1HR TROPONIN T REFLEX: 19 NG/L
GLOBULIN UR ELPH-MCNC: 3 GM/DL
GLUCOSE SERPL-MCNC: 113 MG/DL (ref 65–99)
GLUCOSE UR STRIP-MCNC: NEGATIVE MG/DL
HCT VFR BLD AUTO: 32.2 % (ref 34–46.6)
HGB BLD-MCNC: 10.7 G/DL (ref 12–15.9)
HGB UR QL STRIP.AUTO: ABNORMAL
HOLD SPECIMEN: NORMAL
HYALINE CASTS UR QL AUTO: ABNORMAL /LPF
IMM GRANULOCYTES # BLD AUTO: 0.89 10*3/MM3 (ref 0–0.05)
IMM GRANULOCYTES NFR BLD AUTO: 3.2 % (ref 0–0.5)
KETONES UR QL STRIP: ABNORMAL
LEUKOCYTE ESTERASE UR QL STRIP.AUTO: ABNORMAL
LYMPHOCYTES # BLD AUTO: 1.28 10*3/MM3 (ref 0.7–3.1)
LYMPHOCYTES NFR BLD AUTO: 4.7 % (ref 19.6–45.3)
MCH RBC QN AUTO: 29.3 PG (ref 26.6–33)
MCHC RBC AUTO-ENTMCNC: 33.2 G/DL (ref 31.5–35.7)
MCV RBC AUTO: 88.2 FL (ref 79–97)
MONOCYTES # BLD AUTO: 1.25 10*3/MM3 (ref 0.1–0.9)
MONOCYTES NFR BLD AUTO: 4.6 % (ref 5–12)
NEUTROPHILS NFR BLD AUTO: 23.92 10*3/MM3 (ref 1.7–7)
NEUTROPHILS NFR BLD AUTO: 87.3 % (ref 42.7–76)
NITRITE UR QL STRIP: NEGATIVE
NRBC BLD AUTO-RTO: 0 /100 WBC (ref 0–0.2)
PH UR STRIP.AUTO: <=5 [PH] (ref 5–8)
PLATELET # BLD AUTO: 211 10*3/MM3 (ref 140–450)
PMV BLD AUTO: 9.6 FL (ref 6–12)
POTASSIUM SERPL-SCNC: 4.9 MMOL/L (ref 3.5–5.2)
PROT SERPL-MCNC: 6.5 G/DL (ref 6–8.5)
PROT UR QL STRIP: ABNORMAL
RBC # BLD AUTO: 3.65 10*6/MM3 (ref 3.77–5.28)
RBC # UR STRIP: ABNORMAL /HPF
REF LAB TEST METHOD: ABNORMAL
SODIUM SERPL-SCNC: 130 MMOL/L (ref 136–145)
SP GR UR STRIP: 1.02 (ref 1–1.03)
SQUAMOUS #/AREA URNS HPF: ABNORMAL /HPF
TROPONIN T % DELTA: -32 %
TROPONIN T NUMERIC DELTA: -9 NG/L
TROPONIN T SERPL HS-MCNC: 28 NG/L
UROBILINOGEN UR QL STRIP: ABNORMAL
WBC # UR STRIP: ABNORMAL /HPF
WBC NRBC COR # BLD AUTO: 27.4 10*3/MM3 (ref 3.4–10.8)
WHOLE BLOOD HOLD COAG: NORMAL

## 2024-12-21 PROCEDURE — 81001 URINALYSIS AUTO W/SCOPE: CPT | Performed by: STUDENT IN AN ORGANIZED HEALTH CARE EDUCATION/TRAINING PROGRAM

## 2024-12-21 PROCEDURE — 80156 ASSAY CARBAMAZEPINE TOTAL: CPT | Performed by: STUDENT IN AN ORGANIZED HEALTH CARE EDUCATION/TRAINING PROGRAM

## 2024-12-21 PROCEDURE — 93005 ELECTROCARDIOGRAM TRACING: CPT | Performed by: STUDENT IN AN ORGANIZED HEALTH CARE EDUCATION/TRAINING PROGRAM

## 2024-12-21 PROCEDURE — G0378 HOSPITAL OBSERVATION PER HR: HCPCS

## 2024-12-21 PROCEDURE — 87077 CULTURE AEROBIC IDENTIFY: CPT | Performed by: STUDENT IN AN ORGANIZED HEALTH CARE EDUCATION/TRAINING PROGRAM

## 2024-12-21 PROCEDURE — 73560 X-RAY EXAM OF KNEE 1 OR 2: CPT

## 2024-12-21 PROCEDURE — 93010 ELECTROCARDIOGRAM REPORT: CPT | Performed by: INTERNAL MEDICINE

## 2024-12-21 PROCEDURE — 36415 COLL VENOUS BLD VENIPUNCTURE: CPT

## 2024-12-21 PROCEDURE — 80053 COMPREHEN METABOLIC PANEL: CPT | Performed by: STUDENT IN AN ORGANIZED HEALTH CARE EDUCATION/TRAINING PROGRAM

## 2024-12-21 PROCEDURE — 85025 COMPLETE CBC W/AUTO DIFF WBC: CPT | Performed by: STUDENT IN AN ORGANIZED HEALTH CARE EDUCATION/TRAINING PROGRAM

## 2024-12-21 PROCEDURE — 74176 CT ABD & PELVIS W/O CONTRAST: CPT

## 2024-12-21 PROCEDURE — 99285 EMERGENCY DEPT VISIT HI MDM: CPT

## 2024-12-21 PROCEDURE — 87086 URINE CULTURE/COLONY COUNT: CPT | Performed by: STUDENT IN AN ORGANIZED HEALTH CARE EDUCATION/TRAINING PROGRAM

## 2024-12-21 PROCEDURE — 84484 ASSAY OF TROPONIN QUANT: CPT | Performed by: STUDENT IN AN ORGANIZED HEALTH CARE EDUCATION/TRAINING PROGRAM

## 2024-12-21 PROCEDURE — 87186 SC STD MICRODIL/AGAR DIL: CPT | Performed by: STUDENT IN AN ORGANIZED HEALTH CARE EDUCATION/TRAINING PROGRAM

## 2024-12-21 PROCEDURE — 25810000003 SODIUM CHLORIDE 0.9 % SOLUTION: Performed by: INTERNAL MEDICINE

## 2024-12-21 RX ORDER — ONDANSETRON 4 MG/1
4 TABLET, ORALLY DISINTEGRATING ORAL EVERY 6 HOURS PRN
Status: DISCONTINUED | OUTPATIENT
Start: 2024-12-21 | End: 2024-12-25 | Stop reason: HOSPADM

## 2024-12-21 RX ORDER — LISINOPRIL 20 MG/1
20 TABLET ORAL EVERY MORNING
Status: DISCONTINUED | OUTPATIENT
Start: 2024-12-22 | End: 2024-12-25 | Stop reason: HOSPADM

## 2024-12-21 RX ORDER — MULTIPLE VITAMINS W/ MINERALS TAB 9MG-400MCG
1 TAB ORAL EVERY MORNING
Status: DISCONTINUED | OUTPATIENT
Start: 2024-12-22 | End: 2024-12-25 | Stop reason: HOSPADM

## 2024-12-21 RX ORDER — BISACODYL 10 MG
10 SUPPOSITORY, RECTAL RECTAL DAILY PRN
Status: DISCONTINUED | OUTPATIENT
Start: 2024-12-21 | End: 2024-12-25 | Stop reason: HOSPADM

## 2024-12-21 RX ORDER — ALBUTEROL SULFATE 0.83 MG/ML
2.5 SOLUTION RESPIRATORY (INHALATION) EVERY 6 HOURS PRN
Status: DISCONTINUED | OUTPATIENT
Start: 2024-12-21 | End: 2024-12-25 | Stop reason: HOSPADM

## 2024-12-21 RX ORDER — KETOROLAC TROMETHAMINE 15 MG/ML
15 INJECTION, SOLUTION INTRAMUSCULAR; INTRAVENOUS ONCE
Status: DISCONTINUED | OUTPATIENT
Start: 2024-12-21 | End: 2024-12-22

## 2024-12-21 RX ORDER — BISACODYL 5 MG/1
5 TABLET, DELAYED RELEASE ORAL DAILY PRN
Status: DISCONTINUED | OUTPATIENT
Start: 2024-12-21 | End: 2024-12-25 | Stop reason: HOSPADM

## 2024-12-21 RX ORDER — ACETAMINOPHEN 650 MG/1
650 SUPPOSITORY RECTAL EVERY 4 HOURS PRN
Status: DISCONTINUED | OUTPATIENT
Start: 2024-12-21 | End: 2024-12-25 | Stop reason: HOSPADM

## 2024-12-21 RX ORDER — ACETAMINOPHEN 325 MG/1
650 TABLET ORAL EVERY 4 HOURS PRN
Status: DISCONTINUED | OUTPATIENT
Start: 2024-12-21 | End: 2024-12-25 | Stop reason: HOSPADM

## 2024-12-21 RX ORDER — ACETAMINOPHEN 160 MG/5ML
650 SOLUTION ORAL EVERY 4 HOURS PRN
Status: DISCONTINUED | OUTPATIENT
Start: 2024-12-21 | End: 2024-12-25 | Stop reason: HOSPADM

## 2024-12-21 RX ORDER — ONDANSETRON 2 MG/ML
4 INJECTION INTRAMUSCULAR; INTRAVENOUS EVERY 6 HOURS PRN
Status: DISCONTINUED | OUTPATIENT
Start: 2024-12-21 | End: 2024-12-25 | Stop reason: HOSPADM

## 2024-12-21 RX ORDER — SODIUM CHLORIDE 9 MG/ML
75 INJECTION, SOLUTION INTRAVENOUS CONTINUOUS
Status: DISCONTINUED | OUTPATIENT
Start: 2024-12-21 | End: 2024-12-23

## 2024-12-21 RX ORDER — CALCIUM CARBONATE 500(1250)
500 TABLET ORAL DAILY
Status: DISCONTINUED | OUTPATIENT
Start: 2024-12-22 | End: 2024-12-25 | Stop reason: HOSPADM

## 2024-12-21 RX ORDER — CARBAMAZEPINE 100 MG/1
100 TABLET, CHEWABLE ORAL DAILY PRN
Status: DISCONTINUED | OUTPATIENT
Start: 2024-12-21 | End: 2024-12-25 | Stop reason: HOSPADM

## 2024-12-21 RX ORDER — AMOXICILLIN 250 MG
2 CAPSULE ORAL 2 TIMES DAILY PRN
Status: DISCONTINUED | OUTPATIENT
Start: 2024-12-21 | End: 2024-12-25 | Stop reason: HOSPADM

## 2024-12-21 RX ORDER — PROPRANOLOL HYDROCHLORIDE 40 MG/1
40 TABLET ORAL 2 TIMES DAILY
Status: DISCONTINUED | OUTPATIENT
Start: 2024-12-21 | End: 2024-12-25 | Stop reason: HOSPADM

## 2024-12-21 RX ORDER — AMLODIPINE BESYLATE 5 MG/1
5 TABLET ORAL EVERY MORNING
Status: DISCONTINUED | OUTPATIENT
Start: 2024-12-22 | End: 2024-12-25 | Stop reason: HOSPADM

## 2024-12-21 RX ORDER — LIDOCAINE 4 G/G
1 PATCH TOPICAL
Status: DISCONTINUED | OUTPATIENT
Start: 2024-12-22 | End: 2024-12-25 | Stop reason: HOSPADM

## 2024-12-21 RX ORDER — POLYETHYLENE GLYCOL 3350 17 G/17G
17 POWDER, FOR SOLUTION ORAL DAILY PRN
Status: DISCONTINUED | OUTPATIENT
Start: 2024-12-21 | End: 2024-12-25 | Stop reason: HOSPADM

## 2024-12-21 RX ORDER — HYDROCODONE BITARTRATE AND ACETAMINOPHEN 5; 325 MG/1; MG/1
1 TABLET ORAL EVERY 6 HOURS PRN
Status: DISCONTINUED | OUTPATIENT
Start: 2024-12-21 | End: 2024-12-25 | Stop reason: HOSPADM

## 2024-12-21 RX ORDER — PANTOPRAZOLE SODIUM 40 MG/1
40 TABLET, DELAYED RELEASE ORAL
Status: DISCONTINUED | OUTPATIENT
Start: 2024-12-22 | End: 2024-12-25 | Stop reason: HOSPADM

## 2024-12-21 RX ORDER — FLUTICASONE PROPIONATE 50 MCG
2 SPRAY, SUSPENSION (ML) NASAL DAILY
Status: DISCONTINUED | OUTPATIENT
Start: 2024-12-22 | End: 2024-12-25 | Stop reason: HOSPADM

## 2024-12-21 RX ADMIN — HYDROCODONE BITARTRATE AND ACETAMINOPHEN 1 TABLET: 5; 325 TABLET ORAL at 21:47

## 2024-12-21 RX ADMIN — SODIUM CHLORIDE 75 ML/HR: 9 INJECTION, SOLUTION INTRAVENOUS at 21:47

## 2024-12-21 NOTE — ED NOTES
Patient to ER via ems from home for R knee pain post trip and fall. Patient reports feeling weaker than normal after stent placed for kidney stones 2 days ago. Patient down for aprox 1 hour. No head injury no blood thinners

## 2024-12-21 NOTE — ED PROVIDER NOTES
EMERGENCY DEPARTMENT ENCOUNTER  Room Number:  24/24  PCP: Elizabeth Soto MD  Independent Historians: Patient and Family    HPI:  Chief Complaint: had concerns including Fall and Knee Pain.     Context: Keyana Liz is a 89 y.o. female with a medical history of HTN, UTIs who presents to the ED c/o acute weakness and fall.  Patient states she had a stent replaced due to a recent very large kidney stone earlier this week.  Since stents replacement patient has had increasing fatigue, weakness and malaise.  Patient states due to this she had a fall yesterday where she fell and landed on her right knee.  Patient has had pain in her right knee and her sacrum since the fall.      PAST MEDICAL HISTORY  Active Ambulatory Problems     Diagnosis Date Noted    Zoraida-prosthetic fracture of femur at tip of prosthesis 03/14/2019    Hypotension 03/14/2019    Closed supracondylar fracture of left humerus 03/14/2019    Allergic rhinitis     Arthritis     Benign essential hypertension     Benign paroxysmal positional vertigo     Chronic renal insufficiency     Age-related osteoporosis without current pathological fracture     Other screening mammogram     Medicare annual wellness visit, subsequent 12/11/2019    Spinal stenosis, lumbar region, with neurogenic claudication 01/29/2021    Dizziness 07/11/2022    Lumbar radiculopathy 07/11/2022    Abnormal urine 07/27/2022    Hyperkalemia 07/27/2022    Fatigue 07/27/2022    Severe headache 07/27/2022    Shortness of breath 07/27/2022    Abnormal EKG 07/27/2022    Generalized weakness 07/27/2022    Electrolyte disturbance 07/29/2022    Dyspnea 07/29/2022    Lung nodule 08/04/2022    Elevated TSH 08/04/2022    Adrenal adenoma, left 08/04/2022    Hiatal hernia 08/04/2022    Hyponatremia 08/04/2022    Overactive bladder 03/01/2023    Osteoporosis 03/08/2023    Trigger middle finger of right hand 07/03/2023    Episodic cluster headache, not intractable 07/03/2023    Acute  pyelonephritis 10/20/2024    Left ureteral stone 10/20/2024    Sepsis secondary to UTI 10/20/2024    Abnormal CT of the abdomen 10/20/2024    Acute UTI 10/21/2024    Abnormal CT scan, pelvis 11/13/2024     Resolved Ambulatory Problems     Diagnosis Date Noted    Leukocytosis 03/14/2019    Acute post-hemorrhagic anemia 03/15/2019    JAI (acute kidney injury) 03/15/2019    Postmenopausal HRT (hormone replacement therapy)     Lumbar radiculitis 12/01/2021    Lumbar stenosis with neurogenic claudication 12/01/2021    Primary hypertension 07/11/2022     Past Medical History:   Diagnosis Date    Acute bronchitis     Analgesic overuse headache     Cancer     Cluster headache     Constipation     Cough     Dehydration     Dysuria     Fall at home     Hypertension     Hypertension, uncontrolled     Hypertensive urgency     Increased frequency of urination     Kidney stones     Otitis media     Short-term memory loss     Shoulder pain, left     Sleep apnea     TACS (trigeminal autonomic cephalgias)     Urinary frequency     UTI symptoms     Vertigo     Vitamin B12 deficiency          PAST SURGICAL HISTORY  Past Surgical History:   Procedure Laterality Date    CYSTOSCOPY W/ URETERAL STENT PLACEMENT N/A 10/20/2024    Procedure: CYSTOSCOPY URETERAL CATHETER/STENT INSERTION;  Surgeon: Maverick Park MD;  Location: [facility];  Service: Urology;  Laterality: N/A;    EXTRACORPOREAL SHOCK WAVE LITHOTRIPSY (ESWL) Left 11/4/2024    Procedure: LEFT SHOCKWAVE LITHOTRIPSY;  Surgeon: Georgi Patel MD;  Location: [facility];  Service: Urology;  Laterality: Left;    EXTRACORPOREAL SHOCK WAVE LITHOTRIPSY (ESWL) Left 11/20/2024    Procedure: LEFT URETERAL  SHOCKWAVE LITHOTRIPSY;  Surgeon: Maverick Park MD;  Location: [facility];  Service: Urology;  Laterality: Left;    FEMUR FRACTURE SURGERY Left     FEMUR IM NAILING/RODDING Left 3/16/2019    Procedure: Fixation of periprosthetic femur fracture;  Surgeon:  Lauro Friedman MD;  Location: [facility] MAIN OR;  Service: Orthopedics    LUMBAR EPIDURAL INJECTION N/A 10/27/2021    Procedure: LUMBAR EPIDURAL 1ST VISIT Lumbar 5-sacral 1;  Surgeon: Brittani Fleming MD;  Location: [facility];  Service: Pain Management;  Laterality: N/A;    LUMBAR EPIDURAL INJECTION N/A 12/13/2021    Procedure: lumbar epidural steroid injection;  Surgeon: Brittani Fleming MD;  Location: [facility];  Service: Pain Management;  Laterality: N/A;    MASTECTOMY      URETEROSCOPY LASER LITHOTRIPSY WITH STENT INSERTION Left 12/19/2024    Procedure: LEFT URETEROSCOPY LASER LITHOTRIPSY WITH STENT REMOVAL AND REPLACEMENT;  Surgeon: Maverick Park MD;  Location: [facility] MAIN OR;  Service: Urology;  Laterality: Left;         FAMILY HISTORY  Family History   Problem Relation Age of Onset    No Known Problems Mother     No Known Problems Father     Malig Hyperthermia Neg Hx          SOCIAL HISTORY  Social History     Socioeconomic History    Marital status:    Tobacco Use    Smoking status: Never     Passive exposure: Never    Smokeless tobacco: Never    Tobacco comments:     4 cups of coffee in the AM   Vaping Use    Vaping status: Never Used   Substance and Sexual Activity    Alcohol use: No    Drug use: No    Sexual activity: Defer         ALLERGIES  Cvs diuretic maximum strength [pamabrom]      REVIEW OF SYSTEMS  Review of Systems  Included in HPI  All systems reviewed and negative except for those discussed in HPI.      PHYSICAL EXAM    I have reviewed the triage vital signs and nursing notes.    ED Triage Vitals [12/21/24 1650]   Temp Heart Rate Resp BP SpO2   98 °F (36.7 °C) 69 16 104/43 96 %      Temp src Heart Rate Source Patient Position BP Location FiO2 (%)   -- -- -- -- --       Physical Exam  GENERAL: alert, no acute distress  SKIN: Warm, dry  HENT: Normocephalic, atraumatic  EYES: no scleral icterus  CV: regular rhythm, regular rate  RESPIRATORY: normal effort, lungs  clear  ABDOMEN: soft, nontender, nondistended  MUSCULOSKELETAL: no deformity.  Tenderness palpation of the left knee and right buttocks without obvious deformity  NEURO: alert, moves all extremities, follows commands            LAB RESULTS  Recent Results (from the past 24 hours)   ECG 12 Lead Electrolyte Imbalance    Collection Time: 12/21/24  5:16 PM   Result Value Ref Range    QT Interval 369 ms    QTC Interval 399 ms   Comprehensive Metabolic Panel    Collection Time: 12/21/24  5:18 PM    Specimen: Blood   Result Value Ref Range    Glucose 113 (H) 65 - 99 mg/dL    BUN 42 (H) 8 - 23 mg/dL    Creatinine 1.79 (H) 0.57 - 1.00 mg/dL    Sodium 130 (L) 136 - 145 mmol/L    Potassium 4.9 3.5 - 5.2 mmol/L    Chloride 94 (L) 98 - 107 mmol/L    CO2 25.0 22.0 - 29.0 mmol/L    Calcium 9.0 8.2 - 9.6 mg/dL    Total Protein 6.5 6.0 - 8.5 g/dL    Albumin 3.5 3.5 - 5.2 g/dL    ALT (SGPT) 7 1 - 33 U/L    AST (SGOT) 25 1 - 32 U/L    Alkaline Phosphatase 53 39 - 117 U/L    Total Bilirubin 0.4 0.0 - 1.2 mg/dL    Globulin 3.0 gm/dL    A/G Ratio 1.2 g/dL    BUN/Creatinine Ratio 23.5 7.0 - 25.0    Anion Gap 11.0 5.0 - 15.0 mmol/L    eGFR 26.5 (L) >60.0 mL/min/1.73   Carbamazepine Level, Total    Collection Time: 12/21/24  5:18 PM    Specimen: Blood   Result Value Ref Range    Carbamazepine Level <2.0 (L) 4.0 - 12.0 mcg/mL   High Sensitivity Troponin T    Collection Time: 12/21/24  5:18 PM    Specimen: Blood   Result Value Ref Range    HS Troponin T 28 (H) <14 ng/L   CBC Auto Differential    Collection Time: 12/21/24  5:18 PM    Specimen: Blood   Result Value Ref Range    WBC 27.40 (H) 3.40 - 10.80 10*3/mm3    RBC 3.65 (L) 3.77 - 5.28 10*6/mm3    Hemoglobin 10.7 (L) 12.0 - 15.9 g/dL    Hematocrit 32.2 (L) 34.0 - 46.6 %    MCV 88.2 79.0 - 97.0 fL    MCH 29.3 26.6 - 33.0 pg    MCHC 33.2 31.5 - 35.7 g/dL    RDW 12.4 12.3 - 15.4 %    RDW-SD 39.7 37.0 - 54.0 fl    MPV 9.6 6.0 - 12.0 fL    Platelets 211 140 - 450 10*3/mm3    Neutrophil % 87.3  (H) 42.7 - 76.0 %    Lymphocyte % 4.7 (L) 19.6 - 45.3 %    Monocyte % 4.6 (L) 5.0 - 12.0 %    Eosinophil % 0.0 (L) 0.3 - 6.2 %    Basophil % 0.2 0.0 - 1.5 %    Immature Grans % 3.2 (H) 0.0 - 0.5 %    Neutrophils, Absolute 23.92 (H) 1.70 - 7.00 10*3/mm3    Lymphocytes, Absolute 1.28 0.70 - 3.10 10*3/mm3    Monocytes, Absolute 1.25 (H) 0.10 - 0.90 10*3/mm3    Eosinophils, Absolute 0.01 0.00 - 0.40 10*3/mm3    Basophils, Absolute 0.05 0.00 - 0.20 10*3/mm3    Immature Grans, Absolute 0.89 (H) 0.00 - 0.05 10*3/mm3    nRBC 0.0 0.0 - 0.2 /100 WBC   Gray Top    Collection Time: 12/21/24  5:18 PM   Result Value Ref Range    Extra Tube Hold for add-ons.    Light Blue Top    Collection Time: 12/21/24  5:18 PM   Result Value Ref Range    Extra Tube Hold for add-ons.    High Sensitivity Troponin T 1Hr    Collection Time: 12/21/24  6:23 PM    Specimen: Blood   Result Value Ref Range    HS Troponin T 19 (H) <14 ng/L    Troponin T Numeric Delta -9 ng/L    Troponin T % Delta -32 Abnormal if >/= 20% %         RADIOLOGY  XR Knee 1 or 2 View Left    Result Date: 12/21/2024  XR KNEE 1 OR 2 VW LEFT-  INDICATION: Post fall  COMPARISON: 3/14/2019      Low bone mineralization limits evaluation. Within limitation, no evidence of acute fracture. Normal alignment. No knee joint effusion. Mild medial and lateral compartment narrowing.  Plate and screw fixation extending from the proximal metaphysis to the distal metaphysis transfixing a healed prior femoral diaphyseal fracture. Multiple cerclage wires are also present. Hardware is intact. Additional intact proximal right intramedullary nicole with dynamic interlocking screw transfixing a prior partially visualized intertrochanteric femur fracture..  This report was finalized on 12/21/2024 5:45 PM by Dr. Gus Corrales M.D on Workstation: [id number]         MEDICATIONS GIVEN IN ER  Medications   HYDROcodone-acetaminophen (NORCO) 5-325 MG per tablet 1 tablet (has no administration in time range)    acetaminophen (TYLENOL) tablet 650 mg (has no administration in time range)     Or   acetaminophen (TYLENOL) 160 MG/5ML oral solution 650 mg (has no administration in time range)     Or   acetaminophen (TYLENOL) suppository 650 mg (has no administration in time range)   ondansetron ODT (ZOFRAN-ODT) disintegrating tablet 4 mg (has no administration in time range)     Or   ondansetron (ZOFRAN) injection 4 mg (has no administration in time range)   melatonin tablet 2.5 mg (has no administration in time range)   sennosides-docusate (PERICOLACE) 8.6-50 MG per tablet 2 tablet (has no administration in time range)     And   polyethylene glycol (MIRALAX) packet 17 g (has no administration in time range)     And   bisacodyl (DULCOLAX) EC tablet 5 mg (has no administration in time range)     And   bisacodyl (DULCOLAX) suppository 10 mg (has no administration in time range)   sodium chloride 0.9 % infusion (has no administration in time range)         ORDERS PLACED DURING THIS VISIT:  Orders Placed This Encounter   Procedures    XR Knee 1 or 2 View Left    CT Abdomen Pelvis Without Contrast    Comprehensive Metabolic Panel    Urinalysis With Microscopic If Indicated (No Culture) - Urine, Clean Catch    Carbamazepine Level, Total    High Sensitivity Troponin T    CBC Auto Differential    [place] Draw    High Sensitivity Troponin T 1Hr    Basic Metabolic Panel    CBC (No Diff)    Diet: Cardiac; Healthy Heart (2-3 Na+); Fluid Consistency: Thin (IDDSI 0)    Vital Signs    Up with assistance    Daily Weights    Strict Intake & Output    Oral Care    Place Sequential Compression Device    Maintain Sequential Compression Device    Code Status and Medical Interventions: CPR (Attempt to Resuscitate); Full    ECG 12 Lead Electrolyte Imbalance    Initiate Observation Status    CBC & Differential    Gold Top - SST    Gray Top    Light Blue Top         OUTPATIENT MEDICATION MANAGEMENT:  Current Facility-Administered Medications Ordered  in Epic   Medication Dose Route Frequency Provider Last Rate Last Admin    acetaminophen (TYLENOL) tablet 650 mg  650 mg Oral Q4H PRN Oma Villafuerte MD        Or    acetaminophen (TYLENOL) 160 MG/5ML oral solution 650 mg  650 mg Oral Q4H PRN Oma Villafuerte MD        Or    acetaminophen (TYLENOL) suppository 650 mg  650 mg Rectal Q4H PRN Oma Villafuerte MD        sennosides-docusate (PERICOLACE) 8.6-50 MG per tablet 2 tablet  2 tablet Oral BID PRN Christo, Oma Peters MD        And    polyethylene glycol (MIRALAX) packet 17 g  17 g Oral Daily PRN Christo, Oma Peters MD        And    bisacodyl (DULCOLAX) EC tablet 5 mg  5 mg Oral Daily PRN Oma Villafuerte MD        And    bisacodyl (DULCOLAX) suppository 10 mg  10 mg Rectal Daily PRN Christo, Oma Peters MD        HYDROcodone-acetaminophen (NORCO) 5-325 MG per tablet 1 tablet  1 tablet Oral Q6H PRN Christo, Oma Peters MD        melatonin tablet 2.5 mg  2.5 mg Oral Nightly PRN Christo, Oma Peters MD        ondansetron ODT (ZOFRAN-ODT) disintegrating tablet 4 mg  4 mg Oral Q6H PRN Oma Villafuerte MD        Or    ondansetron (ZOFRAN) injection 4 mg  4 mg Intravenous Q6H PRN Oma Villafuerte MD        sodium chloride 0.9 % infusion  75 mL/hr Intravenous Continuous Oma Villafuerte MD         Current Outpatient Medications Ordered in Epic   Medication Sig Dispense Refill    acetaminophen (TYLENOL) 500 MG tablet Take 1 tablet by mouth Every 6 (Six) Hours As Needed for Mild Pain.      albuterol sulfate  (90 Base) MCG/ACT inhaler Inhale 2 puffs Every 4 (Four) Hours As Needed for Shortness of Air or Wheezing (cough). 18 g 0    amLODIPine (NORVASC) 5 MG tablet Take 1.5 tablets by mouth Daily. 135 tablet 3    Calcium 600-200 MG-UNIT per tablet Take 1 tablet by mouth 2 (Two) Times a Day.      carBAMazepine (TEGretol) 100 MG chewable tablet Chew 1 tablet Daily As Needed (cluster headache). 30 tablet 0     cephalexin (KEFLEX) 500 MG capsule Take 1 capsule by mouth 2 (Two) Times a Day for 14 days. 28 capsule 0    fluticasone (FLONASE) 50 MCG/ACT nasal spray Administer 2 sprays into the nostril(s) as directed by provider Daily.      HYDROcodone-acetaminophen (NORCO) 5-325 MG per tablet Take 1 tablet by mouth Every 6 (Six) Hours As Needed for Moderate Pain. 20 tablet 0    lisinopril (PRINIVIL,ZESTRIL) 20 MG tablet Take 1 tablet by mouth Daily. for blood pressure 90 tablet 3    Multiple Vitamins-Minerals (MULTIVITAMIN WITH MINERALS) tablet tablet Take 1 tablet by mouth Daily.      omeprazole (priLOSEC) 20 MG capsule Take 1 capsule by mouth Daily.      oxyCODONE (OXY-IR) 5 MG capsule Take 1 capsule by mouth Every 4 (Four) Hours As Needed for Moderate Pain.      phenazopyridine (PYRIDIUM) 200 MG tablet Take 1 tablet by mouth 3 (Three) Times a Day As Needed for Bladder Spasms.      propranolol (INDERAL) 40 MG tablet Take 1 tablet by mouth 2 (Two) Times a Day. 180 tablet 1         PROCEDURES  Procedures            PROGRESS, DATA ANALYSIS, CONSULTS, AND MEDICAL DECISION MAKING  All labs have been independently interpreted by me.  All radiology studies have been reviewed by me. All EKG's have been independently viewed and interpreted by me.  Discussion below represents my analysis of pertinent findings related to patient's condition, differential diagnosis, treatment plan and final disposition.    Differential diagnosis includes but is not limited to electrolyte abnormality, knee fracture, sacral fracture, UTI, dehydration, JAI.    Clinical Scores:                                       ED Course as of 12/21/24 1908   Sat Dec 21, 2024   1729 EKG interpreted by me demonstrates sinus rhythm, rate of 70, no ND/QT prolongation, no ST elevation [MW]   1905 CT abdomen and pelvis interpreted by me and demonstrates no evidence of free air, obstruction [MW]   1907 Workup in the emergency department is notable for significant leukocytosis  of 27 as well as creatinine of 1.79.  Will plan on admission for further evaluation and management.  Unclear etiology of patient's leukocytosis.   [MW]   1908 Discussed with Dr. Villafuerte who agrees to admit.  CT abdomen and pelvis and urinalysis pending at time of admission. [MW]      ED Course User Index  [MW] Gus Best MD             AS OF 19:08 EST VITALS:    BP - 104/43  HR - 69  TEMP - 98 °F (36.7 °C)  O2 SATS - 96%    COMPLEXITY OF CARE  The patient requires admission.      DIAGNOSIS  Final diagnoses:   Weakness   JAI (acute kidney injury)   Leukocytosis, unspecified type   Acute pain of left knee         DISPOSITION  ED Disposition       ED Disposition   Decision to Admit    Condition   --    Comment   Level of Care: Telemetry [5]   Diagnosis: JAI (acute kidney injury) [735045]   Admitting Physician: RYLAN VILLAFUERTE [[id number]]   Attending Physician: RYLAN VILLAFUERTE [[id number]]   Is patient appropriate for Inpatient Observation Unit?: Yes [1]                  Please note that portions of this document were completed with a voice recognition program.    Note Disclaimer: At [facility], we believe that sharing information builds trust and better relationships. You are receiving this note because you recently visited [facility]. It is possible you will see health information before a provider has talked with you about it. This kind of information can be easy to misunderstand. To help you fully understand what it means for your health, we urge you to discuss this note with your provider.         Gus Best MD  12/21/24 [id number]

## 2024-12-22 ENCOUNTER — APPOINTMENT (OUTPATIENT)
Dept: CT IMAGING | Facility: HOSPITAL | Age: 89
End: 2024-12-22
Payer: MEDICARE

## 2024-12-22 PROBLEM — R78.81 BACTEREMIA: Status: ACTIVE | Noted: 2024-12-22

## 2024-12-22 LAB
ANION GAP SERPL CALCULATED.3IONS-SCNC: 12.9 MMOL/L (ref 5–15)
BACTERIA BLD CULT: ABNORMAL
BACTERIA ID TEST ISLT QL CULT: ABNORMAL
BOTTLE TYPE: ABNORMAL
BUN SERPL-MCNC: 39 MG/DL (ref 8–23)
BUN/CREAT SERPL: 28.1 (ref 7–25)
CALCIUM SPEC-SCNC: 8.1 MG/DL (ref 8.2–9.6)
CHLORIDE SERPL-SCNC: 91 MMOL/L (ref 98–107)
CO2 SERPL-SCNC: 24.1 MMOL/L (ref 22–29)
CREAT SERPL-MCNC: 1.39 MG/DL (ref 0.57–1)
D-LACTATE SERPL-SCNC: 0.8 MMOL/L (ref 0.5–2)
DEPRECATED RDW RBC AUTO: 40.5 FL (ref 37–54)
EGFRCR SERPLBLD CKD-EPI 2021: 35.9 ML/MIN/1.73
ERYTHROCYTE [DISTWIDTH] IN BLOOD BY AUTOMATED COUNT: 12.3 % (ref 12.3–15.4)
GLUCOSE SERPL-MCNC: 105 MG/DL (ref 65–99)
HCT VFR BLD AUTO: 27.7 % (ref 34–46.6)
HGB BLD-MCNC: 9.1 G/DL (ref 12–15.9)
MCH RBC QN AUTO: 29.6 PG (ref 26.6–33)
MCHC RBC AUTO-ENTMCNC: 32.9 G/DL (ref 31.5–35.7)
MCV RBC AUTO: 90.2 FL (ref 79–97)
PLATELET # BLD AUTO: 172 10*3/MM3 (ref 140–450)
PMV BLD AUTO: 10.1 FL (ref 6–12)
POTASSIUM SERPL-SCNC: 4 MMOL/L (ref 3.5–5.2)
QT INTERVAL: 369 MS
QTC INTERVAL: 399 MS
RBC # BLD AUTO: 3.07 10*6/MM3 (ref 3.77–5.28)
SODIUM SERPL-SCNC: 128 MMOL/L (ref 136–145)
WBC NRBC COR # BLD AUTO: 19.21 10*3/MM3 (ref 3.4–10.8)

## 2024-12-22 PROCEDURE — 85027 COMPLETE CBC AUTOMATED: CPT | Performed by: STUDENT IN AN ORGANIZED HEALTH CARE EDUCATION/TRAINING PROGRAM

## 2024-12-22 PROCEDURE — 97162 PT EVAL MOD COMPLEX 30 MIN: CPT

## 2024-12-22 PROCEDURE — 25810000003 SODIUM CHLORIDE 0.9 % SOLUTION: Performed by: INTERNAL MEDICINE

## 2024-12-22 PROCEDURE — 83605 ASSAY OF LACTIC ACID: CPT | Performed by: INTERNAL MEDICINE

## 2024-12-22 PROCEDURE — 87154 CUL TYP ID BLD PTHGN 6+ TRGT: CPT | Performed by: INTERNAL MEDICINE

## 2024-12-22 PROCEDURE — 87040 BLOOD CULTURE FOR BACTERIA: CPT | Performed by: INTERNAL MEDICINE

## 2024-12-22 PROCEDURE — 72192 CT PELVIS W/O DYE: CPT

## 2024-12-22 PROCEDURE — 87186 SC STD MICRODIL/AGAR DIL: CPT | Performed by: INTERNAL MEDICINE

## 2024-12-22 PROCEDURE — 25010000002 CEFTRIAXONE PER 250 MG: Performed by: INTERNAL MEDICINE

## 2024-12-22 PROCEDURE — 87077 CULTURE AEROBIC IDENTIFY: CPT | Performed by: INTERNAL MEDICINE

## 2024-12-22 PROCEDURE — 25010000002 ERTAPENEM PER 500 MG: Performed by: STUDENT IN AN ORGANIZED HEALTH CARE EDUCATION/TRAINING PROGRAM

## 2024-12-22 PROCEDURE — 97530 THERAPEUTIC ACTIVITIES: CPT

## 2024-12-22 PROCEDURE — 80048 BASIC METABOLIC PNL TOTAL CA: CPT | Performed by: STUDENT IN AN ORGANIZED HEALTH CARE EDUCATION/TRAINING PROGRAM

## 2024-12-22 RX ORDER — PHENAZOPYRIDINE HYDROCHLORIDE 100 MG/1
TABLET, FILM COATED ORAL
Status: CANCELLED | OUTPATIENT
Start: 2024-12-22

## 2024-12-22 RX ADMIN — SODIUM CHLORIDE 75 ML/HR: 9 INJECTION, SOLUTION INTRAVENOUS at 23:13

## 2024-12-22 RX ADMIN — AMLODIPINE BESYLATE 5 MG: 5 TABLET ORAL at 06:57

## 2024-12-22 RX ADMIN — HYDROCODONE BITARTRATE AND ACETAMINOPHEN 1 TABLET: 5; 325 TABLET ORAL at 10:29

## 2024-12-22 RX ADMIN — LIDOCAINE 1 PATCH: 42 PATCH PERCUTANEOUS; TOPICAL; TRANSDERMAL at 08:50

## 2024-12-22 RX ADMIN — SODIUM CHLORIDE 75 ML/HR: 9 INJECTION, SOLUTION INTRAVENOUS at 12:58

## 2024-12-22 RX ADMIN — FLUTICASONE PROPIONATE 2 SPRAY: 50 SPRAY, METERED NASAL at 08:50

## 2024-12-22 RX ADMIN — PROPRANOLOL HYDROCHLORIDE 40 MG: 40 TABLET ORAL at 23:07

## 2024-12-22 RX ADMIN — PANTOPRAZOLE SODIUM 40 MG: 40 TABLET, DELAYED RELEASE ORAL at 06:46

## 2024-12-22 RX ADMIN — Medication 1 TABLET: at 06:46

## 2024-12-22 RX ADMIN — ERTAPENEM SODIUM 500 MG: 1 INJECTION, POWDER, LYOPHILIZED, FOR SOLUTION INTRAMUSCULAR; INTRAVENOUS at 17:44

## 2024-12-22 RX ADMIN — HYDROCODONE BITARTRATE AND ACETAMINOPHEN 1 TABLET: 5; 325 TABLET ORAL at 20:10

## 2024-12-22 RX ADMIN — CALCIUM 500 MG: 500 TABLET ORAL at 08:50

## 2024-12-22 RX ADMIN — HYDROCODONE BITARTRATE AND ACETAMINOPHEN 1 TABLET: 5; 325 TABLET ORAL at 04:14

## 2024-12-22 RX ADMIN — CEFTRIAXONE SODIUM 1000 MG: 1 INJECTION, POWDER, FOR SOLUTION INTRAMUSCULAR; INTRAVENOUS at 00:30

## 2024-12-22 NOTE — NURSING NOTE
Propanolol scheduled dose at 0045 on 12/22 omitted due to BP: 89/43 HR 64.  Recheck at 0139 with BP:  107/49 HR:  68.  Gave Norvasc 5 mg at 0700 but moved lisinopril to 0900 due to BP:  107/49  HR:  68.  Meg Kiran RN

## 2024-12-22 NOTE — THERAPY EVALUATION
Patient Name: Keyana Liz  : 9/15/1933    MRN: [id number]                              Today's Date: 2024       Admit Date: 2024    Visit Dx:     ICD-10-CM ICD-9-CM   1. Weakness  R53.1 780.79   2. JAI (acute kidney injury)  N17.9 584.9   3. Leukocytosis, unspecified type  D72.829 288.60   4. Acute pain of left knee  M25.562 719.46     Patient Active Problem List   Diagnosis    Zoraida-prosthetic fracture of femur at tip of prosthesis    Hypotension    Closed supracondylar fracture of left humerus    Allergic rhinitis    Arthritis    Benign essential hypertension    Benign paroxysmal positional vertigo    Chronic renal insufficiency    Age-related osteoporosis without current pathological fracture    Other screening mammogram    Medicare annual wellness visit, subsequent    Spinal stenosis, lumbar region, with neurogenic claudication    Dizziness    Lumbar radiculopathy    Abnormal urine    Hyperkalemia    Fatigue    Severe headache    Shortness of breath    Abnormal EKG    Generalized weakness    Electrolyte disturbance    Dyspnea    Lung nodule    Elevated TSH    Adrenal adenoma, left    Hiatal hernia    Hyponatremia    Overactive bladder    Osteoporosis    Trigger middle finger of right hand    Episodic cluster headache, not intractable    Acute pyelonephritis    Left ureteral stone    Sepsis secondary to UTI    Abnormal CT of the abdomen    Acute UTI    Abnormal CT scan, pelvis    JIA (acute kidney injury)     Past Medical History:   Diagnosis Date    Acute bronchitis     Allergic rhinitis     Analgesic overuse headache     Arthritis     Benign essential hypertension     Benign paroxysmal positional vertigo     Cancer     Chronic renal insufficiency     Cluster headache     Constipation     Cough     Dehydration     Dizziness     Dysuria     Fall at home     Fatigue     Hypertension     Hypertension, uncontrolled     Hypertensive urgency     Increased frequency of urination     Kidney stones      Osteoporosis     Otitis media     Postmenopausal HRT (hormone replacement therapy)     Short-term memory loss     Shoulder pain, left     Sleep apnea     no machine    TACS (trigeminal autonomic cephalgias)     Urinary frequency     UTI symptoms     Vertigo     Vitamin B12 deficiency      Past Surgical History:   Procedure Laterality Date    CYSTOSCOPY W/ URETERAL STENT PLACEMENT N/A 10/20/2024    Procedure: CYSTOSCOPY URETERAL CATHETER/STENT INSERTION;  Surgeon: Maverick Park MD;  Location: [facility] MAIN OR;  Service: Urology;  Laterality: N/A;    EXTRACORPOREAL SHOCK WAVE LITHOTRIPSY (ESWL) Left 11/4/2024    Procedure: LEFT SHOCKWAVE LITHOTRIPSY;  Surgeon: Georgi Patel MD;  Location: [facility] MAIN OR;  Service: Urology;  Laterality: Left;    EXTRACORPOREAL SHOCK WAVE LITHOTRIPSY (ESWL) Left 11/20/2024    Procedure: LEFT URETERAL  SHOCKWAVE LITHOTRIPSY;  Surgeon: Maverick Park MD;  Location: [facility] MAIN OR;  Service: Urology;  Laterality: Left;    FEMUR FRACTURE SURGERY Left     FEMUR IM NAILING/RODDING Left 3/16/2019    Procedure: Fixation of periprosthetic femur fracture;  Surgeon: Lauro Friedman MD;  Location: [facility] MAIN OR;  Service: Orthopedics    LUMBAR EPIDURAL INJECTION N/A 10/27/2021    Procedure: LUMBAR EPIDURAL 1ST VISIT Lumbar 5-sacral 1;  Surgeon: Brittani Fleming MD;  Location: [facility] MAIN OR;  Service: Pain Management;  Laterality: N/A;    LUMBAR EPIDURAL INJECTION N/A 12/13/2021    Procedure: lumbar epidural steroid injection;  Surgeon: Brittani Fleming MD;  Location: [facility] MAIN OR;  Service: Pain Management;  Laterality: N/A;    MASTECTOMY      URETEROSCOPY LASER LITHOTRIPSY WITH STENT INSERTION Left 12/19/2024    Procedure: LEFT URETEROSCOPY LASER LITHOTRIPSY WITH STENT REMOVAL AND REPLACEMENT;  Surgeon: Maverick Park MD;  Location: [facility] MAIN OR;  Service: Urology;  Laterality: Left;      General Information       Row Name 12/22/24 4607          Physical Therapy  Time and Intention    Document Type evaluation  -CW     Mode of Treatment individual therapy;physical therapy  -CW       Row Name 12/22/24 1150          General Information    Patient Profile Reviewed yes  -CW     Prior Level of Function independent:;transfer;all household mobility;bed mobility  -CW     Existing Precautions/Restrictions fall  -CW     Barriers to Rehab none identified  -CW       Row Name 12/22/24 1150          Living Environment    People in Home alone  -CW       Row Name 12/22/24 1150          Cognition    Orientation Status (Cognition) oriented x 4  -CW       Row Name 12/22/24 1150          Safety Issues/Impairments Affecting Functional Mobility    Impairments Affecting Function (Mobility) balance;pain;endurance/activity tolerance;strength  -CW               User Key  (r) = Recorded By, (t) = Taken By, (c) = Cosigned By      Initials Name Provider Type    CW Rebekah Carrasco PT Physical Therapist                   Mobility       Row Name 12/22/24 1151          Bed Mobility    Bed Mobility supine-sit;sit-supine  -CW     Supine-Sit [place] (Bed Mobility) verbal cues;standby assist  -CW     Sit-Supine [place] (Bed Mobility) minimum assist (75% patient effort);1 person assist;verbal cues  -CW     Assistive Device (Bed Mobility) leg   -CW     Comment, (Bed Mobility) Increased time to complete due to pain, use of BUE to progress LLE out of/into bed  -CW       Row Name 12/22/24 1151          Sit-Stand Transfer    Sit-Stand [place] (Transfers) verbal cues;moderate assist (50% patient effort);1 person assist  -CW     Assistive Device (Sit-Stand Transfers) walker, front-wheeled  -CW     Comment, (Sit-Stand Transfer) STS x2  -CW       Row Name 12/22/24 1151          Gait/Stairs (Locomotion)    [place] Level (Gait) minimum assist (75% patient effort);1 person assist;verbal cues  -CW     Assistive Device (Gait) walker, front-wheeled  -CW     Distance in Feet (Gait) 2  -CW      Deviations/Abnormal Patterns (Gait) antalgic;brad decreased;gait speed decreased;stride length decreased  -CW     Bilateral Gait Deviations forward flexed posture  -CW     Left Sided Gait Deviations weight shift ability decreased  -CW     Comment, (Gait/Stairs) Lateral steps towards HOB, heavy use of UE on walker, decreased weight bearing through LLE  -CW               User Key  (r) = Recorded By, (t) = Taken By, (c) = Cosigned By      Initials Name Provider Type    Rebekah Mckee PT Physical Therapist                   Obj/Interventions       Row Name 12/22/24 1154          Range of Motion Comprehensive    General Range of Motion bilateral lower extremity ROM WFL  -CW       Row Name 12/22/24 1154          Strength Comprehensive (MMT)    Comment, General Manual Muscle Testing (MMT) Assessment Generalized weakness BLE L>R  -CW       Row Name 12/22/24 1154          Balance    Balance Assessment standing static balance;standing dynamic balance;sitting static balance  -CW     Static Sitting Balance standby assist  -CW     Position, Sitting Balance sitting edge of bed  -CW     Static Standing Balance minimal assist;moderate assist  -CW     Dynamic Standing Balance minimal assist  -CW     Position/Device Used, Standing Balance supported;walker, front-wheeled  -CW     Comment, Balance R lateral lean requiring mod A initially but progressed to min A  -CW               User Key  (r) = Recorded By, (t) = Taken By, (c) = Cosigned By      Initials Name Provider Type    Rebekah Mckee PT Physical Therapist                   Goals/Plan       Row Name 12/22/24 1218          Bed Mobility Goal 1 (PT)    Activity/Assistive Device (Bed Mobility Goal 1, PT) bed mobility activities, all  -CW     [place] Level/Cues Needed (Bed Mobility Goal 1, PT) modified independence  -CW     Time Frame (Bed Mobility Goal 1, PT) 2 weeks  -CW       Row Name 12/22/24 1218          Transfer Goal 1 (PT)    Activity/Assistive Device  (Transfer Goal 1, PT) sit-to-stand/stand-to-sit;bed-to-chair/chair-to-bed  -CW     [place] Level/Cues Needed (Transfer Goal 1, PT) modified independence  -CW     Time Frame (Transfer Goal 1, PT) 2 weeks  -CW       Row Name 12/22/24 1218          Gait Training Goal 1 (PT)    Activity/Assistive Device (Gait Training Goal 1, PT) gait (walking locomotion)  -CW     [place] Level (Gait Training Goal 1, PT) modified independence  -CW     Distance (Gait Training Goal 1, PT) 100'  -CW     Time Frame (Gait Training Goal 1, PT) 2 weeks  -CW       Row Name 12/22/24 1218          Therapy Assessment/Plan (PT)    Planned Therapy Interventions (PT) balance training;bed mobility training;gait training;postural re-education;transfer training;ROM (range of motion);strengthening;patient/family education  -CW               User Key  (r) = Recorded By, (t) = Taken By, (c) = Cosigned By      Initials Name Provider Type    CW Rebekah Carrasco, PT Physical Therapist                   Clinical Impression       Row Name 12/22/24 1210          Pain    Pretreatment Pain Rating 0/10 - no pain  -CW     Posttreatment Pain Rating 5/10  -CW     Pain Side/Orientation left  -CW     Pain Management Interventions positioning techniques utilized;exercise or physical activity utilized  -CW     Response to Pain Interventions activity participation with tolerable pain  -CW     Pre/Posttreatment Pain Comment L knee pain, reports pain between her legs from recent stent procedure  -CW       Row Name 12/22/24 1210          Plan of Care Review    Plan of Care Reviewed With patient  -CW     Outcome Evaluation Pt is a 89 yo female admitted with fall and L knee pain, pt reports feeling weaker since recent left URS and stent change for ureteral stone on 12/19/24. Pt lives alone, is typically independent with functional mobility. Pt reports owning 3 walkers she can use as needed. Today, pt limited by pain but is agreeable to mobilize. She completed  supine>sit with sba and increased time, using BUE to progress L LE off the bed. Pt stood twice from EOB with mod A x1 with walker. Initially, pt had significant R lateral lean but improved with longer duration of standing. Pt was able to take a few lateral steps towards HOB with min A x1 and RW. Decreased weight bearing noted through LLE but could t/f to BSC with nsg staff as needed. Pt may benefit from ongoing skilled PT services to address functional mobility deficits. May need SNF at d/c as pt is functioning below her baseline.  -CW       Row Name 12/22/24 1210          Therapy Assessment/Plan (PT)    Rehab Potential (PT) good  -CW     Criteria for Skilled Interventions Met (PT) yes  -CW     Therapy Frequency (PT) 6 times/wk  -CW       Row Name 12/22/24 1210          Vital Signs    O2 Delivery Pre Treatment room air  -CW     O2 Delivery Intra Treatment room air  -CW     O2 Delivery Post Treatment room air  -CW       Row Name 12/22/24 1210          Positioning and Restraints    Pre-Treatment Position in bed  -CW     Post Treatment Position bed  -CW     In Bed notified nsg;call light within reach;encouraged to call for assist;exit alarm on;with family/caregiver;fowlers  -CW               User Key  (r) = Recorded By, (t) = Taken By, (c) = Cosigned By      Initials Name Provider Type    CW Rebekah Carrasco, PT Physical Therapist                   Outcome Measures       Row Name 12/22/24 1215          How much help from another person do you currently need...    Turning from your back to your side while in flat bed without using bedrails? 3  -CW     Moving from lying on back to sitting on the side of a flat bed without bedrails? 3  -CW     Moving to and from a bed to a chair (including a wheelchair)? 3  -CW     Standing up from a chair using your arms (e.g., wheelchair, bedside chair)? 3  -CW     Climbing 3-5 steps with a railing? 1  -CW     To walk in hospital room? 2  -CW     AM-PAC 6 Clicks Score (PT) 15  -CW      Highest Level of Mobility Goal 4 --> Transfer to chair/commode  -       Row Name 12/22/24 1219          Functional Assessment    Outcome Measure Options AM-PAC 6 Clicks Basic Mobility (PT)  -               User Key  (r) = Recorded By, (t) = Taken By, (c) = Cosigned By      Initials Name Provider Type    CW Rebekah Carrasco PT Physical Therapist                                 Physical Therapy Education       Title: PT OT SLP Therapies (In Progress)       Topic: Physical Therapy (In Progress)       Point: Mobility training (Done)       Learning Progress Summary            Patient Acceptance, E, VU,NR by CW at 12/22/2024 1219                      Point: Home exercise program (Not Started)       Learner Progress:  Not documented in this visit.              Point: Body mechanics (Done)       Learning Progress Summary            Patient Acceptance, E, VU,NR by CW at 12/22/2024 1219                      Point: Precautions (Not Started)       Learner Progress:  Not documented in this visit.                              User Key       Initials Effective Dates Name Provider Type Discipline     12/13/22 -  Rebekah Carrasco PT Physical Therapist PT                  PT Recommendation and Plan  Planned Therapy Interventions (PT): balance training, bed mobility training, gait training, postural re-education, transfer training, ROM (range of motion), strengthening, patient/family education  Outcome Evaluation: Pt is a 89 yo female admitted with fall and L knee pain, pt reports feeling weaker since recent left URS and stent change for ureteral stone on 12/19/24. Pt lives alone, is typically independent with functional mobility. Pt reports owning 3 walkers she can use as needed. Today, pt limited by pain but is agreeable to mobilize. She completed supine>sit with sba and increased time, using BUE to progress L LE off the bed. Pt stood twice from EOB with mod A x1 with walker. Initially, pt had significant R lateral lean  but improved with longer duration of standing. Pt was able to take a few lateral steps towards HOB with min A x1 and RW. Decreased weight bearing noted through LLE but could t/f to BSC with nsg staff as needed. Pt may benefit from ongoing skilled PT services to address functional mobility deficits. May need SNF at d/c as pt is functioning below her baseline.     Time Calculation:         PT Charges       Row Name 12/22/24 1149             Time Calculation    Start Time 1036  -CW      Stop Time 1057  -CW      Time Calculation (min) 21 min  -CW      PT Received On 12/22/24  -CW      PT - Next Appointment 12/23/24  -CW      PT Goal Re-Cert Due Date 01/05/25  -CW         Time Calculation- PT    Total Timed Code Minutes- PT 14 minute(s)  -CW         Timed Charges    80402 - PT Therapeutic Activity Minutes 14  -CW         Total Minutes    Timed Charges Total Minutes 14  -CW       Total Minutes 14  -CW                User Key  (r) = Recorded By, (t) = Taken By, (c) = Cosigned By      Initials Name Provider Type    CW Rebekah Carrasco, PT Physical Therapist                  Therapy Charges for Today       Code Description Service Date Service Provider Modifiers Qty    26199750788 HC PT EVAL MOD COMPLEXITY 3 12/22/2024 Rebekah Carrasco, PT GP 1    46353165932 HC PT THERAPEUTIC ACT EA 15 MIN 12/22/2024 Rebekah Carrasco, PT GP 1            PT G-Codes  Outcome Measure Options: AM-PAC 6 Clicks Basic Mobility (PT)  AM-PAC 6 Clicks Score (PT): 15  PT Discharge Summary  Anticipated Discharge Disposition (PT): skilled nursing facility    Rebekah Carrasco PT  12/22/2024

## 2024-12-22 NOTE — PROGRESS NOTES
Name: Keyana Liz ADMIT: 2024   : 9/15/1933  PCP: Elizabeth Soto MD    MRN: [id number] LOS: 0 days   AGE/SEX: 89 y.o. female  ROOM: [facility]     Subjective   Subjective   Patient's pain is well-controlled.  Family at bedside.  Continues to have tailbone pain and left knee pain.  Discussed radiology findings.  These pains will take time to heal.  Tolerating IV ceftriaxone.  Urology to see.         Objective   Objective   Vital Signs  Temp:  [97.8 °F (36.6 °C)-99.3 °F (37.4 °C)] 97.8 °F (36.6 °C)  Heart Rate:  [64-80] 69  Resp:  [16-18] 18  BP: ()/(43-60) 119/51  SpO2:  [88 %-98 %] 98 %  on  Flow (L/min) (Oxygen Therapy):  [2] 2;   Device (Oxygen Therapy): nasal cannula  Body mass index is 23.58 kg/m².  Physical Exam  Constitutional:       General: She is not in acute distress.     Appearance: She is not ill-appearing.      Comments: Elderly   Cardiovascular:      Rate and Rhythm: Normal rate and regular rhythm.   Pulmonary:      Effort: Pulmonary effort is normal. No respiratory distress.   Abdominal:      General: Abdomen is flat. There is no distension.      Tenderness: There is no abdominal tenderness.   Musculoskeletal:         General: No swelling or deformity. Normal range of motion.      Comments: Left knee tenderness to palpation   Skin:     General: Skin is warm and dry.   Neurological:      General: No focal deficit present.      Mental Status: She is alert. Mental status is at baseline.         Results Review     I reviewed the patient's new clinical results.  Results from last 7 days   Lab Units 24  0653 24  1718   WBC 10*3/mm3 19.21* 27.40*   HEMOGLOBIN g/dL 9.1* 10.7*   PLATELETS 10*3/mm3 172 211     Results from last 7 days   Lab Units 24  0653 24  1718   SODIUM mmol/L 128* 130*   POTASSIUM mmol/L 4.0 4.9   CHLORIDE mmol/L 91* 94*   CO2 mmol/L 24.1 25.0   BUN mg/dL 39* 42*   CREATININE mg/dL 1.39* 1.79*   GLUCOSE mg/dL 105* 113*   Estimated  "Creatinine Clearance: 28.4 mL/min (A) (by C-G formula based on SCr of 1.39 mg/dL (H)).  Results from last 7 days   Lab Units 12/21/24  1718   ALBUMIN g/dL 3.5   BILIRUBIN mg/dL 0.4   ALK PHOS U/L 53   AST (SGOT) U/L 25   ALT (SGPT) U/L 7     Results from last 7 days   Lab Units 12/22/24  0653 12/21/24  1718   CALCIUM mg/dL 8.1* 9.0   ALBUMIN g/dL  --  3.5     Results from last 7 days   Lab Units 12/22/24  0023   LACTATE mmol/L 0.8     COVID19   Date Value Ref Range Status   11/07/2024 Not Detected Not Detected - Ref. Range Final   08/29/2024 Not Detected Not Detected - Ref. Range Final     No results found for: \"HGBA1C\", \"POCGLU\"    CT Abdomen Pelvis Without Contrast  Narrative: CT OF THE ABDOMEN/PELVIS WITHOUT CONTRAST     HISTORY: Weakness after ureteral stent placement     COMPARISON: October 20, 2024     TECHNIQUE: Axial CT imaging was obtained through the abdomen and pelvis.  No IV contrast was administered.     FINDINGS:  Images through the lung bases demonstrate some reticulonodular  infiltrates at the right lung base, which appears similar to October 2022. There is some atelectasis versus infiltrate noted at the left lung  base, new when compared to prior study. There is some noncalcified  pulmonary nodules noted at the left lung base which are stable compared  to 2022, and are benign. No suspicious hepatic lesions are seen. High  density material within the gallbladder may represent stones or sludge.  There is a small hiatal hernia. The duodenum and adrenal glands appear  normal. There is mild pancreatic atrophy. Calcified granulomata are seen  within the spleen. There is a double-J ureteral stent which is noted on  the left. A previously noted stone within the proximal left ureter is no  longer seen. Left-sided hydronephrosis has resolved. There is a 2 mm  nonobstructing stone which is noted within the inferior pole of the left  kidney. This was not clearly seen on the prior exam. There is " some  perinephric and periureteral stranding on the left. Correlation with  urinalysis and urine cultures is recommended. No stones are noted on the  right. Air is noted within the urinary bladder, which may be related to  recent instrumentation. Intramedullary nicole is noted within the proximal  left femur. There are aortoiliac calcifications. Uterus is grossly  unremarkable, given unenhanced technique. No adnexal masses are seen.  There is no bowel obstruction. There is colonic diverticulosis. There is  no evidence of appendicitis. There is some very subtle buckling of the  anterior cortex of S4 on the sagittal series. It is more apparent than  on recent CT from October 20, 2024. Nondisplaced fracture is not  excluded. No pelvic hematoma is seen.     Impression:    1. Interval placement of a double-J ureteral stent on the left.  Previously noted left ureteral stone is no longer seen. Hydronephrosis  has almost completely resolved. There is periureteral and perinephric  stranding on the left. Correlation with urinalysis and urine cultures is  recommended.  2. Atelectasis versus infiltrate noted at the left lung base.  3. There is some subtle buckling of the anterior cortex of S4 on the  sagittal series. It is more apparent than on prior exam. Nondisplaced  fracture is not excluded. No pelvic hematoma is seen.     Radiation dose reduction techniques were utilized, including automated  exposure control and exposure modulation based on body size.        This report was finalized on 12/21/2024 7:43 PM by Dr. Juana Guillermo M.D on Workstation: [id number]     XR Knee 1 or 2 View Left  Narrative: XR KNEE 1 OR 2 VW LEFT-     INDICATION: Post fall     COMPARISON: 3/14/2019     Impression: Low bone mineralization limits evaluation. Within  limitation, no evidence of acute fracture. Normal alignment. No knee  joint effusion. Mild medial and lateral compartment narrowing.     Plate and screw fixation extending from the  proximal metaphysis to the  distal metaphysis transfixing a healed prior femoral diaphyseal  fracture. Multiple cerclage wires are also present. Hardware is intact.  Additional intact proximal right intramedullary nicole with dynamic  interlocking screw transfixing a prior partially visualized  intertrochanteric femur fracture..     This report was finalized on 12/21/2024 5:45 PM by Dr. Gus Corrales M.D on Workstation: [id number]       I reviewed the patient's daily medications.  Scheduled Medications  [Held by provider] amLODIPine, 5 mg, Oral, QAM  calcium carbonate (oyster shell), 500 mg, Oral, Daily  cefTRIAXone, 1,000 mg, Intravenous, Q24H  fluticasone, 2 spray, Nasal, Daily  Lidocaine, 1 patch, Transdermal, Q24H  [Held by provider] lisinopril, 20 mg, Oral, QAM  multivitamin with minerals, 1 tablet, Oral, QAM  pantoprazole, 40 mg, Oral, Q AM  propranolol, 40 mg, Oral, BID    Infusions  sodium chloride, 75 mL/hr, Last Rate: 75 mL/hr (12/21/24 2147)    Diet  Diet: Cardiac; Healthy Heart (2-3 Na+); Fluid Consistency: Thin (IDDSI 0)         I have personally reviewed:  [x]  Laboratory   [x]  Microbiology   [x]  Radiology   []  EKG/Telemetry   []  Cardiology/Vascular   []  Pathology   [x]  Records     Assessment/Plan     Active Hospital Problems    Diagnosis  POA    JAI (acute kidney injury) [N17.9]  Yes      Resolved Hospital Problems   No resolved problems to display.       89 y.o. female admitted with <principal problem not specified>.    Urinary tract infection, concern for early left pyelonephritis  Nephrolithiasis with recent double-J ureteral left stent  -Urine and blood cultures pending  -Urology consulted, patient recommendations  -continue ceftriaxone    JAI  -Improving with IV fluids  -Hold lisinopril    Hyponatremia  -Continue fluids     Hypertension-hold amlodipine, lisinopril for now.  Continue propranolol    Subtle buckling of the S4, nondisplaced fracture not excluded  Left knee pain  -X-ray with no  acute fracture, hardware intact  -Pain well-controlled on current regimen    SCDs for DVT prophylaxis.  Limited code (no CPR, no intubation).  Discussed with patient, family, and nursing staff.  Anticipate discharge home with HH vs SNU facility in 1-2 days.    Expected discharge date/ time has not been documented.      Yoan Cerda MD  [facility]  12/22/24  09:58 EST

## 2024-12-22 NOTE — CONSULTS
FIRST UROLOGY CONSULT      Patient Identification:  NAME:  Keyana Liz  Age:  89 y.o.   Sex:  female   :  9/15/1933   MRN:  [id number]     Chief complaint: weakness     History of present illness:      89 yo female pt of Dr. Rod Park's with recent left URS and stent change for ureteral stone and hydro on . She is s/p ESWL x2 this fall. She was taking keflex and felt weak and fell at home bringing her to the ED. All flank pain is at baseline from stent and denies any fever or gross hematuria. CT yesterday shows NO obstructing stones, nearly resolved left hydro with some left perinephric stranding.  Cr is improving as well as her WBC Her urine culture is pending and he is currently on rocephin.  Family is at bedside.     In hospital:  -AVSS, good UOP    -WBC - 19.21 (27.40)  -Creat - 1.39 (1.79) baseline is 0.94  -UA - large LE and blood     -CT : resolving left hydro, left perinephric stranding. Tiny left renal stone          Asked to see    Past medical history:  Past Medical History:   Diagnosis Date    Acute bronchitis     Allergic rhinitis     Analgesic overuse headache     Arthritis     Benign essential hypertension     Benign paroxysmal positional vertigo     Cancer     Chronic renal insufficiency     Cluster headache     Constipation     Cough     Dehydration     Dizziness     Dysuria     Fall at home     Fatigue     Hypertension     Hypertension, uncontrolled     Hypertensive urgency     Increased frequency of urination     Kidney stones     Osteoporosis     Otitis media     Postmenopausal HRT (hormone replacement therapy)     Short-term memory loss     Shoulder pain, left     Sleep apnea     no machine    TACS (trigeminal autonomic cephalgias)     Urinary frequency     UTI symptoms     Vertigo     Vitamin B12 deficiency        Past surgical history:  Past Surgical History:   Procedure Laterality Date    CYSTOSCOPY W/ URETERAL STENT PLACEMENT N/A 10/20/2024    Procedure: CYSTOSCOPY  URETERAL CATHETER/STENT INSERTION;  Surgeon: Maverick Park MD;  Location: [facility] MAIN OR;  Service: Urology;  Laterality: N/A;    EXTRACORPOREAL SHOCK WAVE LITHOTRIPSY (ESWL) Left 11/4/2024    Procedure: LEFT SHOCKWAVE LITHOTRIPSY;  Surgeon: Georgi Patel MD;  Location: [facility];  Service: Urology;  Laterality: Left;    EXTRACORPOREAL SHOCK WAVE LITHOTRIPSY (ESWL) Left 11/20/2024    Procedure: LEFT URETERAL  SHOCKWAVE LITHOTRIPSY;  Surgeon: Maverick Park MD;  Location: [facility] MAIN OR;  Service: Urology;  Laterality: Left;    FEMUR FRACTURE SURGERY Left     FEMUR IM NAILING/RODDING Left 3/16/2019    Procedure: Fixation of periprosthetic femur fracture;  Surgeon: Lauro Friedman MD;  Location: [facility] MAIN OR;  Service: Orthopedics    LUMBAR EPIDURAL INJECTION N/A 10/27/2021    Procedure: LUMBAR EPIDURAL 1ST VISIT Lumbar 5-sacral 1;  Surgeon: Brittani Fleming MD;  [facility];  Service: Pain Management;  Laterality: N/A;    LUMBAR EPIDURAL INJECTION N/A 12/13/2021    Procedure: lumbar epidural steroid injection;  Surgeon: Brittani Fleming MD;  Location: [facility];  Service: Pain Management;  Laterality: N/A;    MASTECTOMY      URETEROSCOPY LASER LITHOTRIPSY WITH STENT INSERTION Left 12/19/2024    Procedure: LEFT URETEROSCOPY LASER LITHOTRIPSY WITH STENT REMOVAL AND REPLACEMENT;  Surgeon: Maverick Park MD;  Location: [facility] MAIN OR;  Service: Urology;  Laterality: Left;       Allergies:  Cvs diuretic maximum strength [pamabrom]    Home medications:  Medications Prior to Admission   Medication Sig Dispense Refill Last Dose/Taking    acetaminophen (TYLENOL) 500 MG tablet Take 1 tablet by mouth Every 6 (Six) Hours As Needed for Mild Pain.   Taking As Needed    amLODIPine (NORVASC) 5 MG tablet Take 1.5 tablets by mouth Daily. (Patient taking differently: Take 1 tablet by mouth Every Morning.) 135 tablet 3 12/21/2024    Calcium 600-200 MG-UNIT per tablet Take 1 tablet by  mouth 2 (Two) Times a Day.   12/21/2024    cephalexin (KEFLEX) 500 MG capsule Take 1 capsule by mouth 2 (Two) Times a Day for 14 days. 28 capsule 0 12/21/2024 at  8:00 AM    fluticasone (FLONASE) 50 MCG/ACT nasal spray Administer 2 sprays into the nostril(s) as directed by provider Daily.   12/21/2024 at  8:00 AM    HYDROcodone-acetaminophen (NORCO) 5-325 MG per tablet Take 1 tablet by mouth Every 6 (Six) Hours As Needed for Moderate Pain. 20 tablet 0 12/21/2024    lisinopril (PRINIVIL,ZESTRIL) 20 MG tablet Take 1 tablet by mouth Daily. for blood pressure (Patient taking differently: Take 1 tablet by mouth Every Morning. for blood pressure) 90 tablet 3 12/21/2024 at  8:00 AM    Multiple Vitamins-Minerals (MULTIVITAMIN WITH MINERALS) tablet tablet Take 1 tablet by mouth Every Morning.   12/21/2024    omeprazole (priLOSEC) 20 MG capsule Take 1 capsule by mouth Every Morning.   12/21/2024 at  8:00 AM    propranolol (INDERAL) 40 MG tablet Take 1 tablet by mouth 2 (Two) Times a Day. 180 tablet 1 12/21/2024 at 12:00 AM    albuterol sulfate  (90 Base) MCG/ACT inhaler Inhale 2 puffs Every 4 (Four) Hours As Needed for Shortness of Air or Wheezing (cough). (Patient not taking: Reported on 12/21/2024) 18 g 0 Not Taking    carBAMazepine (TEGretol) 100 MG chewable tablet Chew 1 tablet Daily As Needed (cluster headache). 30 tablet 0 Unknown    oxyCODONE (OXY-IR) 5 MG capsule Take 1 capsule by mouth Every 4 (Four) Hours As Needed for Moderate Pain. (Patient not taking: Reported on 12/21/2024)   Not Taking    phenazopyridine (PYRIDIUM) 200 MG tablet Take 1 tablet by mouth 3 (Three) Times a Day As Needed for Bladder Spasms. (Patient not taking: Reported on 12/21/2024)   Not Taking        Hospital medications:  [Held by provider] amLODIPine, 5 mg, Oral, QAM  calcium carbonate (oyster shell), 500 mg, Oral, Daily  cefTRIAXone, 1,000 mg, Intravenous, Q24H  fluticasone, 2 spray, Nasal, Daily  Lidocaine, 1 patch, Transdermal,  Q24H  [Held by provider] lisinopril, 20 mg, Oral, QAM  multivitamin with minerals, 1 tablet, Oral, QAM  pantoprazole, 40 mg, Oral, Q AM  propranolol, 40 mg, Oral, BID      sodium chloride, 75 mL/hr, Last Rate: 75 mL/hr (24)        acetaminophen **OR** acetaminophen **OR** acetaminophen    albuterol    senna-docusate sodium **AND** polyethylene glycol **AND** bisacodyl **AND** bisacodyl    carBAMazepine    HYDROcodone-acetaminophen    melatonin    ondansetron ODT **OR** ondansetron    Family history:  Family History   Problem Relation Age of Onset    No Known Problems Mother     No Known Problems Father     Malig Hyperthermia Neg Hx        Social history:  Social History     Tobacco Use    Smoking status: Never     Passive exposure: Never    Smokeless tobacco: Never    Tobacco comments:     4 cups of coffee in the AM   Vaping Use    Vaping status: Never Used   Substance Use Topics    Alcohol use: No    Drug use: No       Review of systems:    Noted in HPI       Objective:  TMax 24 hours:   Temp (24hrs), Av.4 °F (36.9 °C), Min:97.8 °F (36.6 °C), Max:99.3 °F (37.4 °C)      Vitals Ranges:   Temp:  [97.8 °F (36.6 °C)-99.3 °F (37.4 °C)] 97.8 °F (36.6 °C)  Heart Rate:  [64-80] 69  Resp:  [16-18] 18  BP: ()/(43-60) 119/51    Intake/Output Last 3 shifts:  I/O last 3 completed shifts:  In: 720 [P.O.:720]  Out: 300 [Urine:300]     Physical Exam:    General Appearance:    Alert, cooperative, NAD, sleepy        Lungs:     Respirations unlabored, no wheezing               Neuro/Psych:   Orientation intact, mood/affect pleasant       Results review:   I reviewed the patient's new clinical results.    Data review:  Lab Results (last 24 hours)       Procedure Component Value Units Date/Time    Urine Culture - Urine, Urine, Clean Catch [[id number]] Collected: 24    Specimen: Urine, Clean Catch Updated: 24 09    Basic Metabolic Panel [[id number]]  (Abnormal) Collected: 24 0653    Specimen:  Blood Updated: 12/22/24 0812     Glucose 105 mg/dL      BUN 39 mg/dL      Creatinine 1.39 mg/dL      Sodium 128 mmol/L      Potassium 4.0 mmol/L      Chloride 91 mmol/L      CO2 24.1 mmol/L      Calcium 8.1 mg/dL      BUN/Creatinine Ratio 28.1     Anion Gap 12.9 mmol/L      eGFR 35.9 mL/min/1.73     Narrative:      GFR Categories in Chronic Kidney Disease (CKD)      GFR Category          GFR (mL/min/1.73)    Interpretation  G1                     90 or greater         Normal or high (1)  G2                      60-89                Mild decrease (1)  G3a                   45-59                Mild to moderate decrease  G3b                   30-44                Moderate to severe decrease  G4                    15-29                Severe decrease  G5                    14 or less           Kidney failure          (1)In the absence of evidence of kidney disease, neither GFR category G1 or G2 fulfill the criteria for CKD.    eGFR calculation 2021 CKD-EPI creatinine equation, which does not include race as a factor    CBC (No Diff) [[id number]]  (Abnormal) Collected: 12/22/24 0653    Specimen: Blood Updated: 12/22/24 0740     WBC 19.21 10*3/mm3      RBC 3.07 10*6/mm3      Hemoglobin 9.1 g/dL      Hematocrit 27.7 %      MCV 90.2 fL      MCH 29.6 pg      MCHC 32.9 g/dL      RDW 12.3 %      RDW-SD 40.5 fl      MPV 10.1 fL      Platelets 172 10*3/mm3     Lactic Acid, Plasma [[id number]]  (Normal) Collected: 12/22/24 0023    Specimen: Blood Updated: 12/22/24 0114     Lactate 0.8 mmol/L     Blood Culture - Blood, Arm, Right [[id number]] Collected: 12/22/24 0022    Specimen: Blood from Arm, Right Updated: 12/22/24 0051    Blood Culture - Blood, Arm, Left [[id number]] Collected: 12/22/24 0022    Specimen: Blood from Arm, Left Updated: 12/22/24 0051    Urinalysis, Microscopic Only - Urine, Clean Catch [[id number]]  (Abnormal) Collected: 12/21/24 2052    Specimen: Urine, Clean Catch Updated: 12/21/24 2111     RBC, UA 11-20 /HPF       WBC, UA Too Numerous to Count /HPF      Bacteria, UA 2+ /HPF      Squamous Epithelial Cells, UA 13-20 /HPF      Hyaline Casts, UA 31-50 /LPF      Methodology Automated Microscopy    Urinalysis With Microscopic If Indicated (No Culture) - Urine, Clean Catch [[id number]]  (Abnormal) Collected: 12/21/24 2052    Specimen: Urine, Clean Catch Updated: 12/21/24 2106     Color, UA Yellow     Appearance, UA Turbid     pH, UA <=5.0     Specific Gravity, UA 1.016     Glucose, UA Negative     Ketones, UA Trace     Bilirubin, UA Negative     Blood, UA Large (3+)     Protein,  mg/dL (2+)     Leuk Esterase, UA Large (3+)     Nitrite, UA Negative     Urobilinogen, UA 0.2 E.U./dL    [place] Draw [[id number]] Collected: 12/21/24 1718    Specimen: Blood Updated: 12/21/24 1928    Narrative:      The following orders were created for panel order [place] Draw.  Procedure                               Abnormality         Status                     ---------                               -----------         ------                     Gold Top - SST[[id number]]                                                              Whitt Top[[id number]]                                         Final result               Light Blue Top[[id number]]                                   Final result                 Please view results for these tests on the individual orders.    High Sensitivity Troponin T 1Hr [[id number]]  (Abnormal) Collected: 12/21/24 1823    Specimen: Blood Updated: 12/21/24 1852     HS Troponin T 19 ng/L      Troponin T Numeric Delta -9 ng/L      Troponin T % Delta -32 %     Narrative:      High Sensitive Troponin T Reference Range:  <14.0 ng/L- Negative Female for AMI  <22.0 ng/L- Negative Male for AMI  >=14 - Abnormal Female indicating possible myocardial injury.  >=22 - Abnormal Male indicating possible myocardial injury.   Clinicians would have to utilize clinical acumen, EKG, Troponin, and serial changes to determine if it is an  Acute Myocardial Infarction or myocardial injury due to an underlying chronic condition.         Carbamazepine Level, Total [[id number]]  (Abnormal) Collected: 12/21/24 1718    Specimen: Blood Updated: 12/21/24 1812     Carbamazepine Level <2.0 mcg/mL     Comprehensive Metabolic Panel [[id number]]  (Abnormal) Collected: 12/21/24 1718    Specimen: Blood Updated: 12/21/24 1751     Glucose 113 mg/dL      BUN 42 mg/dL      Creatinine 1.79 mg/dL      Sodium 130 mmol/L      Potassium 4.9 mmol/L      Comment: Slight hemolysis detected by analyzer. Result may be falsely elevated.        Chloride 94 mmol/L      CO2 25.0 mmol/L      Calcium 9.0 mg/dL      Total Protein 6.5 g/dL      Albumin 3.5 g/dL      ALT (SGPT) 7 U/L      AST (SGOT) 25 U/L      Alkaline Phosphatase 53 U/L      Total Bilirubin 0.4 mg/dL      Globulin 3.0 gm/dL      A/G Ratio 1.2 g/dL      BUN/Creatinine Ratio 23.5     Anion Gap 11.0 mmol/L      eGFR 26.5 mL/min/1.73     Narrative:      GFR Categories in Chronic Kidney Disease (CKD)      GFR Category          GFR (mL/min/1.73)    Interpretation  G1                     90 or greater         Normal or high (1)  G2                      60-89                Mild decrease (1)  G3a                   45-59                Mild to moderate decrease  G3b                   30-44                Moderate to severe decrease  G4                    15-29                Severe decrease  G5                    14 or less           Kidney failure          (1)In the absence of evidence of kidney disease, neither GFR category G1 or G2 fulfill the criteria for CKD.    eGFR calculation 2021 CKD-EPI creatinine equation, which does not include race as a factor    High Sensitivity Troponin T [[id number]]  (Abnormal) Collected: 12/21/24 1718    Specimen: Blood Updated: 12/21/24 1751     HS Troponin T 28 ng/L     Narrative:      High Sensitive Troponin T Reference Range:  <14.0 ng/L- Negative Female for AMI  <22.0 ng/L- Negative Male for  AMI  >=14 - Abnormal Female indicating possible myocardial injury.  >=22 - Abnormal Male indicating possible myocardial injury.   Clinicians would have to utilize clinical acumen, EKG, Troponin, and serial changes to determine if it is an Acute Myocardial Infarction or myocardial injury due to an underlying chronic condition.         Whitt Top [[id number]] Collected: 12/21/24 1718    Specimen: Blood Updated: 12/21/24 1731     Extra Tube Hold for add-ons.     Comment: Auto resulted.       Light Blue Top [[id number]] Collected: 12/21/24 1718    Specimen: Blood Updated: 12/21/24 1731     Extra Tube Hold for add-ons.     Comment: Auto resulted       CBC & Differential [[id number]]  (Abnormal) Collected: 12/21/24 1718    Specimen: Blood Updated: 12/21/24 1729    Narrative:      The following orders were created for panel order CBC & Differential.  Procedure                               Abnormality         Status                     ---------                               -----------         ------                     CBC Auto Differential[[id number]]        Abnormal            Final result                 Please view results for these tests on the individual orders.    CBC Auto Differential [[id number]]  (Abnormal) Collected: 12/21/24 1718    Specimen: Blood Updated: 12/21/24 1729     WBC 27.40 10*3/mm3      RBC 3.65 10*6/mm3      Hemoglobin 10.7 g/dL      Hematocrit 32.2 %      MCV 88.2 fL      MCH 29.3 pg      MCHC 33.2 g/dL      RDW 12.4 %      RDW-SD 39.7 fl      MPV 9.6 fL      Platelets 211 10*3/mm3      Neutrophil % 87.3 %      Lymphocyte % 4.7 %      Monocyte % 4.6 %      Eosinophil % 0.0 %      Basophil % 0.2 %      Immature Grans % 3.2 %      Neutrophils, Absolute 23.92 10*3/mm3      Lymphocytes, Absolute 1.28 10*3/mm3      Monocytes, Absolute 1.25 10*3/mm3      Eosinophils, Absolute 0.01 10*3/mm3      Basophils, Absolute 0.05 10*3/mm3      Immature Grans, Absolute 0.89 10*3/mm3      nRBC 0.0 /100 WBC               Imaging:  Imaging Results (Last 24 Hours)       Procedure Component Value Units Date/Time    CT Pelvis Without Contrast [[id number]] Collected: 12/22/24 1002     Updated: 12/22/24 1018    Narrative:      CT PELVIS WO CONTRAST-     INDICATION: Fall, tailbone pain     COMPARISON: CT abdomen pelvis December 21, 2024     TECHNIQUE:  CT pelvis without IV contrast. Coronal and sagittal reformats. Radiation  dose reduction techniques were utilized, including automated exposure  control and exposure modulation based on body size.     FINDINGS:      Pelvis: Gas in the nondependent bladder lumen. Mild bladder distention.  No bladder calculus. Left ureteral stent uterus is present. No adnexal  mass. Small amount of free fluid seen in the cul-de-sac.     Bowel: Colonic diverticulosis. No obstruction.     Pelvic wall: Rectus diastases.     Retroperitoneum: No lymphadenopathy.     Vasculature: Moderate aortoiliac atherosclerotic calcification.     Osseous structures: Open reduction internal fixation of the left femur  with gamma nail and interlocking femoral neck dynamic compression screw  as well as plate and screw and cable fixation. No lucency around the  hardware. Evidence of old intertrochanteric and femoral diaphysis  fractures. Fracture line seen in the inferolateral left patella. Left  L5/S1 assimilation. Right L5/T1 pseudoarticulation. Lower lumbar facet  degenerative arthropathy with grade 1 anterolisthesis of L4 on L5.  Anterior cortical buckling of the S4 segment of the sacrum is new from  CT performed October 20, 2024, stable from CT performed December 21, 2024, suspect nondisplaced fracture, with small amount of presacral  hemorrhage. Small left knee effusion.       Impression:         1. Nondisplaced fracture in the S4 segment of the sacrum with small  amount of presacral hemorrhage, appears stable in the interval.  2. Open reduction internal fixation of the left femur status post prior  intertrochanteric and  femoral diaphysis fractures.  3. Small linear lucency seen across the inferior lateral margin of the  left patella, an age-indeterminate fracture. Correlation with  tenderness.  4. Small left knee effusion     This report was finalized on 12/22/2024 10:15 AM by Dr. Stanley Cook M.D on Workstation: [id number]       CT Abdomen Pelvis Without Contrast [[id number]] Collected: 12/21/24 1925     Updated: 12/21/24 1946    Narrative:      CT OF THE ABDOMEN/PELVIS WITHOUT CONTRAST     HISTORY: Weakness after ureteral stent placement     COMPARISON: October 20, 2024     TECHNIQUE: Axial CT imaging was obtained through the abdomen and pelvis.  No IV contrast was administered.     FINDINGS:  Images through the lung bases demonstrate some reticulonodular  infiltrates at the right lung base, which appears similar to October 2022. There is some atelectasis versus infiltrate noted at the left lung  base, new when compared to prior study. There is some noncalcified  pulmonary nodules noted at the left lung base which are stable compared  to 2022, and are benign. No suspicious hepatic lesions are seen. High  density material within the gallbladder may represent stones or sludge.  There is a small hiatal hernia. The duodenum and adrenal glands appear  normal. There is mild pancreatic atrophy. Calcified granulomata are seen  within the spleen. There is a double-J ureteral stent which is noted on  the left. A previously noted stone within the proximal left ureter is no  longer seen. Left-sided hydronephrosis has resolved. There is a 2 mm  nonobstructing stone which is noted within the inferior pole of the left  kidney. This was not clearly seen on the prior exam. There is some  perinephric and periureteral stranding on the left. Correlation with  urinalysis and urine cultures is recommended. No stones are noted on the  right. Air is noted within the urinary bladder, which may be related to  recent instrumentation. Intramedullary  nicole is noted within the proximal  left femur. There are aortoiliac calcifications. Uterus is grossly  unremarkable, given unenhanced technique. No adnexal masses are seen.  There is no bowel obstruction. There is colonic diverticulosis. There is  no evidence of appendicitis. There is some very subtle buckling of the  anterior cortex of S4 on the sagittal series. It is more apparent than  on recent CT from October 20, 2024. Nondisplaced fracture is not  excluded. No pelvic hematoma is seen.       Impression:         1. Interval placement of a double-J ureteral stent on the left.  Previously noted left ureteral stone is no longer seen. Hydronephrosis  has almost completely resolved. There is periureteral and perinephric  stranding on the left. Correlation with urinalysis and urine cultures is  recommended.  2. Atelectasis versus infiltrate noted at the left lung base.  3. There is some subtle buckling of the anterior cortex of S4 on the  sagittal series. It is more apparent than on prior exam. Nondisplaced  fracture is not excluded. No pelvic hematoma is seen.     Radiation dose reduction techniques were utilized, including automated  exposure control and exposure modulation based on body size.        This report was finalized on 12/21/2024 7:43 PM by Dr. Juana Guillermo M.D on Workstation: [id number]       XR Knee 1 or 2 View Left [[id number]] Collected: 12/21/24 1740     Updated: 12/21/24 1748    Narrative:      XR KNEE 1 OR 2 VW LEFT-     INDICATION: Post fall     COMPARISON: 3/14/2019       Impression:      Low bone mineralization limits evaluation. Within  limitation, no evidence of acute fracture. Normal alignment. No knee  joint effusion. Mild medial and lateral compartment narrowing.     Plate and screw fixation extending from the proximal metaphysis to the  distal metaphysis transfixing a healed prior femoral diaphyseal  fracture. Multiple cerclage wires are also present. Hardware is intact.  Additional  intact proximal right intramedullary nicole with dynamic  interlocking screw transfixing a prior partially visualized  intertrochanteric femur fracture..     This report was finalized on 12/21/2024 5:45 PM by Dr. Gus Corrales M.D on Workstation: [id number]                Assessment:   JAI   Left pyelo?  Left hydro  H/o Left Ureteral stone - s/p URS, stent change     Plan:   Await urine cx   Continue rocephin   Decline request for pyridum due to JAI at this time   Limit bladder irritants   Long discussion with family and patient about possible UTI and stent plan, all questions answered   Will see tomorrow     Leann Medellin, KATE  12/22/24  11:51 EST

## 2024-12-22 NOTE — PLAN OF CARE
Goal Outcome Evaluation:  Plan of Care Reviewed With: patient           Outcome Evaluation: Pt is a 89 yo female admitted with fall and L knee pain, pt reports feeling weaker since recent left URS and stent change for ureteral stone on 12/19/24. Pt lives alone, is typically independent with functional mobility. Pt reports owning 3 walkers she can use as needed. Today, pt limited by pain but is agreeable to mobilize. She completed supine>sit with sba and increased time, using BUE to progress L LE off the bed. Pt stood twice from EOB with mod A x1 with walker. Initially, pt had significant R lateral lean but improved with longer duration of standing. Pt was able to take a few lateral steps towards HOB with min A x1 and RW. Decreased weight bearing noted through LLE but could t/f to [facility] with nsg staff as needed. Pt may benefit from ongoing skilled PT services to address functional mobility deficits. May need SNF at d/c as pt is functioning below her baseline.    Anticipated Discharge Disposition (PT): skilled nursing facility

## 2024-12-22 NOTE — NURSING NOTE
Nursing report ED to floor  Keyana Liz  89 y.o.  female    HPI :  HPI  Stated Reason for Visit: knee pain post fall    Chief Complaint  Chief Complaint   Patient presents with    Fall    Knee Pain       Admitting doctor:   Oma Villafuerte MD    Admitting diagnosis:   The primary encounter diagnosis was Weakness. Diagnoses of JAI (acute kidney injury), Leukocytosis, unspecified type, and Acute pain of left knee were also pertinent to this visit.    Code status:   Current Code Status       Date Active Code Status Order ID Comments User Context       12/21/2024 1906 CPR (Attempt to Resuscitate) [id number]  Oma Villafuerte MD ED        Question Answer    Code Status (Patient has no pulse and is not breathing) CPR (Attempt to Resuscitate)    Medical Interventions (Patient has pulse or is breathing) Full                    Allergies:   Cvs diuretic maximum strength [pamabrom]    Isolation:   No active isolations    Intake and Output  No intake or output data in the 24 hours ending 12/21/24 1924    Weight:   There were no vitals filed for this visit.    Most recent vitals:   Vitals:    12/21/24 1650   BP: 104/43   Pulse: 69   Resp: 16   Temp: 98 °F (36.7 °C)   SpO2: 96%       Active LDAs/IV Access:   Lines, Drains & Airways       Active LDAs       Name Placement date Placement time Site Days    Peripheral IV 12/21/24 1720 Right Antecubital 12/21/24  1720  Antecubital  less than 1    Ureteral Drain/Stent Left ureter 6 Fr. 10/21/24  0010  Left ureter  61                    Labs (abnormal labs have a star):   Labs Reviewed   COMPREHENSIVE METABOLIC PANEL - Abnormal; Notable for the following components:       Result Value    Glucose 113 (*)     BUN 42 (*)     Creatinine 1.79 (*)     Sodium 130 (*)     Chloride 94 (*)     eGFR 26.5 (*)     All other components within normal limits    Narrative:     GFR Categories in Chronic Kidney Disease (CKD)      GFR Category          GFR (mL/min/1.73)     Interpretation  G1                     90 or greater         Normal or high (1)  G2                      60-89                Mild decrease (1)  G3a                   45-59                Mild to moderate decrease  G3b                   30-44                Moderate to severe decrease  G4                    15-29                Severe decrease  G5                    14 or less           Kidney failure          (1)In the absence of evidence of kidney disease, neither GFR category G1 or G2 fulfill the criteria for CKD.    eGFR calculation 2021 CKD-EPI creatinine equation, which does not include race as a factor   CARBAMAZEPINE LEVEL, TOTAL - Abnormal; Notable for the following components:    Carbamazepine Level <2.0 (*)     All other components within normal limits   TROPONIN - Abnormal; Notable for the following components:    HS Troponin T 28 (*)     All other components within normal limits    Narrative:     High Sensitive Troponin T Reference Range:  <14.0 ng/L- Negative Female for AMI  <22.0 ng/L- Negative Male for AMI  >=14 - Abnormal Female indicating possible myocardial injury.  >=22 - Abnormal Male indicating possible myocardial injury.   Clinicians would have to utilize clinical acumen, EKG, Troponin, and serial changes to determine if it is an Acute Myocardial Infarction or myocardial injury due to an underlying chronic condition.        CBC WITH AUTO DIFFERENTIAL - Abnormal; Notable for the following components:    WBC 27.40 (*)     RBC 3.65 (*)     Hemoglobin 10.7 (*)     Hematocrit 32.2 (*)     Neutrophil % 87.3 (*)     Lymphocyte % 4.7 (*)     Monocyte % 4.6 (*)     Eosinophil % 0.0 (*)     Immature Grans % 3.2 (*)     Neutrophils, Absolute 23.92 (*)     Monocytes, Absolute 1.25 (*)     Immature Grans, Absolute 0.89 (*)     All other components within normal limits   HIGH SENSITIVITIY TROPONIN T 1HR - Abnormal; Notable for the following components:    HS Troponin T 19 (*)     All other components within  normal limits    Narrative:     High Sensitive Troponin T Reference Range:  <14.0 ng/L- Negative Female for AMI  <22.0 ng/L- Negative Male for AMI  >=14 - Abnormal Female indicating possible myocardial injury.  >=22 - Abnormal Male indicating possible myocardial injury.   Clinicians would have to utilize clinical acumen, EKG, Troponin, and serial changes to determine if it is an Acute Myocardial Infarction or myocardial injury due to an underlying chronic condition.        URINALYSIS W/ MICROSCOPIC IF INDICATED (NO CULTURE)   RAINBOW DRAW    Narrative:     The following orders were created for panel order Rochelle Draw.  Procedure                               Abnormality         Status                     ---------                               -----------         ------                     Gold Top - [facility][[id number]]                                                              [place] Top[[id number]]                                         Final result               Light Blue Top[[id number]]                                   Final result                 Please view results for these tests on the individual orders.   CBC AND DIFFERENTIAL    Narrative:     The following orders were created for panel order CBC & Differential.  Procedure                               Abnormality         Status                     ---------                               -----------         ------                     CBC Auto Differential[[id number]]        Abnormal            Final result                 Please view results for these tests on the individual orders.   GRAY TOP   LIGHT BLUE TOP   GOLD TOP - [facility]       EKG:   ECG 12 Lead Electrolyte Imbalance   Preliminary Result   HEART RATE=70  bpm   RR Wxixlpjz=629  ms   VT Ibpyehxr=320  ms   P Horizontal Axis=-28  deg   P Front Axis=44  deg   QRSD Interval=83  ms   QT Scmrgbdv=879  ms   RTaW=375  ms   QRS Axis=7  deg   T Wave Axis=27  deg   - BORDERLINE ECG -   Sinus rhythm   Borderline ST  elevation, lateral leads   Date and Time of Study:2024-12-21 17:16:40          Meds given in ED:   Medications   HYDROcodone-acetaminophen (NORCO) 5-325 MG per tablet 1 tablet (has no administration in time range)   acetaminophen (TYLENOL) tablet 650 mg (has no administration in time range)     Or   acetaminophen (TYLENOL) 160 MG/5ML oral solution 650 mg (has no administration in time range)     Or   acetaminophen (TYLENOL) suppository 650 mg (has no administration in time range)   ondansetron ODT (ZOFRAN-ODT) disintegrating tablet 4 mg (has no administration in time range)     Or   ondansetron (ZOFRAN) injection 4 mg (has no administration in time range)   melatonin tablet 2.5 mg (has no administration in time range)   sennosides-docusate (PERICOLACE) 8.6-50 MG per tablet 2 tablet (has no administration in time range)     And   polyethylene glycol (MIRALAX) packet 17 g (has no administration in time range)     And   bisacodyl (DULCOLAX) EC tablet 5 mg (has no administration in time range)     And   bisacodyl (DULCOLAX) suppository 10 mg (has no administration in time range)   sodium chloride 0.9 % infusion (has no administration in time range)   ketorolac (TORADOL) injection 15 mg (has no administration in time range)       Imaging results:  XR Knee 1 or 2 View Left    Result Date: 12/21/2024  Low bone mineralization limits evaluation. Within limitation, no evidence of acute fracture. Normal alignment. No knee joint effusion. Mild medial and lateral compartment narrowing.  Plate and screw fixation extending from the proximal metaphysis to the distal metaphysis transfixing a healed prior femoral diaphyseal fracture. Multiple cerclage wires are also present. Hardware is intact. Additional intact proximal right intramedullary nicole with dynamic interlocking screw transfixing a prior partially visualized intertrochanteric femur fracture..  This report was finalized on 12/21/2024 5:45 PM by Dr. Gus Corrales M.D on  Workstation: [id number]       Ambulatory status:   - br    Social issues:   Social History     Socioeconomic History    Marital status:    Tobacco Use    Smoking status: Never     Passive exposure: Never    Smokeless tobacco: Never    Tobacco comments:     4 cups of coffee in the AM   Vaping Use    Vaping status: Never Used   Substance and Sexual Activity    Alcohol use: No    Drug use: No    Sexual activity: Defer       Peripheral Neurovascular  Peripheral Neurovascular (Adult)  Peripheral Neurovascular WDL: .WDL except, neurovascular assessment lower  LLE Neurovascular Assessment  Sensation LLE: tenderness present    Neuro Cognitive  Neuro Cognitive (Adult)  Cognitive/Neuro/Behavioral WDL: WDL    Learning  Learning Assessment  Learning Readiness and Ability: no barriers identified    Respiratory  Respiratory  Airway WDL: WDL  Respiratory WDL  Respiratory WDL: WDL    Abdominal Pain       Pain Assessments  Pain (Adult)  (0-10) Pain Rating: Rest: 0  (0-10) Pain Rating: Activity: 0    NIH Stroke Scale       Jeet Smith RN  12/21/24 19:24 EST

## 2024-12-22 NOTE — H&P
HISTORY AND PHYSICAL   [facility]        Date of Admission: 2024  Patient Identification:  Name: Keyana Liz  Age: 89 y.o.  Sex: female  :  9/15/1933  MRN: [id number]                     Primary Care Physician: Elizabeth Soto MD    Chief Complaint:  89 year old female presented to the emergency room with pain of her right knee; she has also been weak and not feeling well; she had a procedure and stent for a kidney stone two days ago; she denies fever or chills; she has also had pain of her tailbone    History of Present Illness:   As above    Past Medical History:  Past Medical History:   Diagnosis Date    Acute bronchitis     Allergic rhinitis     Analgesic overuse headache     Arthritis     Benign essential hypertension     Benign paroxysmal positional vertigo     Cancer     Chronic renal insufficiency     Cluster headache     Constipation     Cough     Dehydration     Dizziness     Dysuria     Fall at home     Fatigue     Hypertension     Hypertension, uncontrolled     Hypertensive urgency     Increased frequency of urination     Kidney stones     Osteoporosis     Otitis media     Postmenopausal HRT (hormone replacement therapy)     Short-term memory loss     Shoulder pain, left     Sleep apnea     no machine    TACS (trigeminal autonomic cephalgias)     Urinary frequency     UTI symptoms     Vertigo     Vitamin B12 deficiency      Past Surgical History:  Past Surgical History:   Procedure Laterality Date    CYSTOSCOPY W/ URETERAL STENT PLACEMENT N/A 10/20/2024    Procedure: CYSTOSCOPY URETERAL CATHETER/STENT INSERTION;  Surgeon: Maverick Park MD;  Location: [facility];  Service: Urology;  Laterality: N/A;    EXTRACORPOREAL SHOCK WAVE LITHOTRIPSY (ESWL) Left 2024    Procedure: LEFT SHOCKWAVE LITHOTRIPSY;  Surgeon: Georgi Patel MD;  Location: [facility];  Service: Urology;  Laterality: Left;    EXTRACORPOREAL SHOCK WAVE LITHOTRIPSY (ESWL) Left  11/20/2024    Procedure: LEFT URETERAL  SHOCKWAVE LITHOTRIPSY;  Surgeon: Maverick Park MD;  Location:  [facility];  Service: Urology;  Laterality: Left;    FEMUR FRACTURE SURGERY Left     FEMUR IM NAILING/RODDING Left 3/16/2019    Procedure: Fixation of periprosthetic femur fracture;  Surgeon: Lauro Friedman MD;  Location:  [facility];  Service: Orthopedics    LUMBAR EPIDURAL INJECTION N/A 10/27/2021    Procedure: LUMBAR EPIDURAL 1ST VISIT Lumbar 5-sacral 1;  Surgeon: Brittani Fleming MD;  Location: [facility];  Service: Pain Management;  Laterality: N/A;    LUMBAR EPIDURAL INJECTION N/A 12/13/2021    Procedure: lumbar epidural steroid injection;  Surgeon: Brittani Fleming MD;  Location: [facility];  Service: Pain Management;  Laterality: N/A;    MASTECTOMY      URETEROSCOPY LASER LITHOTRIPSY WITH STENT INSERTION Left 12/19/2024    Procedure: LEFT URETEROSCOPY LASER LITHOTRIPSY WITH STENT REMOVAL AND REPLACEMENT;  Surgeon: Maverick Park MD;  [facility];  Service: Urology;  Laterality: Left;      Home Meds:  Medications Prior to Admission   Medication Sig Dispense Refill Last Dose/Taking    acetaminophen (TYLENOL) 500 MG tablet Take 1 tablet by mouth Every 6 (Six) Hours As Needed for Mild Pain.   Taking As Needed    amLODIPine (NORVASC) 5 MG tablet Take 1.5 tablets by mouth Daily. (Patient taking differently: Take 1 tablet by mouth Every Morning.) 135 tablet 3 12/21/2024    Calcium 600-200 MG-UNIT per tablet Take 1 tablet by mouth 2 (Two) Times a Day.   12/21/2024    cephalexin (KEFLEX) 500 MG capsule Take 1 capsule by mouth 2 (Two) Times a Day for 14 days. 28 capsule 0 12/21/2024 at  8:00 AM    fluticasone (FLONASE) 50 MCG/ACT nasal spray Administer 2 sprays into the nostril(s) as directed by provider Daily.   12/21/2024 at  8:00 AM    HYDROcodone-acetaminophen (NORCO) 5-325 MG per tablet Take 1 tablet by mouth Every 6 (Six) Hours As Needed for Moderate Pain. 20 tablet 0  12/21/2024    lisinopril (PRINIVIL,ZESTRIL) 20 MG tablet Take 1 tablet by mouth Daily. for blood pressure (Patient taking differently: Take 1 tablet by mouth Every Morning. for blood pressure) 90 tablet 3 12/21/2024 at  8:00 AM    Multiple Vitamins-Minerals (MULTIVITAMIN WITH MINERALS) tablet tablet Take 1 tablet by mouth Every Morning.   12/21/2024    omeprazole (priLOSEC) 20 MG capsule Take 1 capsule by mouth Every Morning.   12/21/2024 at  8:00 AM    propranolol (INDERAL) 40 MG tablet Take 1 tablet by mouth 2 (Two) Times a Day. 180 tablet 1 12/21/2024 at 12:00 AM    albuterol sulfate  (90 Base) MCG/ACT inhaler Inhale 2 puffs Every 4 (Four) Hours As Needed for Shortness of Air or Wheezing (cough). (Patient not taking: Reported on 12/21/2024) 18 g 0 Not Taking    carBAMazepine (TEGretol) 100 MG chewable tablet Chew 1 tablet Daily As Needed (cluster headache). 30 tablet 0 Unknown    oxyCODONE (OXY-IR) 5 MG capsule Take 1 capsule by mouth Every 4 (Four) Hours As Needed for Moderate Pain. (Patient not taking: Reported on 12/21/2024)   Not Taking    phenazopyridine (PYRIDIUM) 200 MG tablet Take 1 tablet by mouth 3 (Three) Times a Day As Needed for Bladder Spasms. (Patient not taking: Reported on 12/21/2024)   Not Taking       Allergies:  Allergies   Allergen Reactions    Cvs Diuretic Maximum Strength [Pamabrom] Other (See Comments)     Unknown diuretic caused hives      Immunizations:  Immunization History   Administered Date(s) Administered    COVID-19 (PFIZER) 12YRS+ (COMIRNATY) 03/18/2024    COVID-19 (PFIZER) Purple Cap Monovalent 03/16/2021, 04/13/2021, 11/04/2021    Covid-19 (Pfizer) Gray Cap Monovalent 09/01/2022    Fluad Quad 65+ 09/03/2020, 09/30/2021    Fluzone  >6mos 09/03/2020    Fluzone High-Dose 65+YRS 10/23/2018    Fluzone High-Dose 65+yrs 10/06/2022, 09/22/2023    Influenza Seasonal Injectable 09/01/2022    Influenza, Unspecified 09/01/2022    Pneumococcal Conjugate 13-Valent (PCV13) 11/23/2015     "Pneumococcal Polysaccharide (PPSV23) 2002, 2015, 2016    Shingrix 2021, 2021     Social History:   Social History     Social History Narrative    Not on file     Social History     Socioeconomic History    Marital status:    Tobacco Use    Smoking status: Never     Passive exposure: Never    Smokeless tobacco: Never    Tobacco comments:     4 cups of coffee in the AM   Vaping Use    Vaping status: Never Used   Substance and Sexual Activity    Alcohol use: No    Drug use: No    Sexual activity: Defer       Family History:  Family History   Problem Relation Age of Onset    No Known Problems Mother     No Known Problems Father     Malig Hyperthermia Neg Hx         Review of Systems  See history of present illness and past medical history.  Patient denies headache, dizziness, syncope, change in vision, change in hearing, change in taste, changes in weight, changes in appetite, focal weakness, numbness, or paresthesia.  Patient denies chest pain, palpitations, dyspnea, orthopnea, PND, cough, sinus pressure, rhinorrhea, epistaxis, hemoptysis, nausea, vomiting,hematemesis, diarrhea, constipation or hematochezia.  Denies cold or heat intolerance, polydipsia, polyuria, polyphagia. Denies hematuria, pyuria, dysuria, hesitancy, frequency or urgency. Denies consumption of raw and under cooked meats foods or change in water source.  Denies fever, chills, sweats, night sweats.      Objective:  T Max 24 hrs: Temp (24hrs), Av.8 °F (37.1 °C), Min:98 °F (36.7 °C), Max:99.3 °F (37.4 °C)    Vitals Ranges:   Temp:  [98 °F (36.7 °C)-99.3 °F (37.4 °C)] 99.3 °F (37.4 °C)  Heart Rate:  [69-80] 80  Resp:  [16] 16  BP: (104-137)/(43-60) 137/60      Exam:  /60 (BP Location: Left arm, Patient Position: Lying)   Pulse 80   Temp 99.3 °F (37.4 °C) (Oral)   Resp 16   Ht 170.2 cm (67\")   Wt 68.3 kg (150 lb 9.2 oz)   SpO2 96%   BMI 23.58 kg/m²     General Appearance:    Alert, cooperative, no " distress, appears stated age   Head:    Normocephalic, without obvious abnormality, atraumatic   Eyes:    PERRL, conjunctivae/corneas clear, EOM's intact, both eyes   Ears:    Normal external ear canals, both ears   Nose:   Nares normal, septum midline, mucosa normal, no drainage    or sinus tenderness   Throat:   Lips, mucosa, and tongue normal   Neck:   Supple, symmetrical, trachea midline, no adenopathy;     thyroid:  no enlargement/tenderness/nodules; no carotid    bruit or JVD   Back:     Symmetric, no curvature, ROM normal, no CVA tenderness   Lungs:     Decreased breath aounds bilaterally, respirations unlabored   Chest Wall:    No tenderness or deformity    Heart:    Regular rate and rhythm, S1 and S2 normal, no murmur, rub   or gallop   Abdomen:     Soft, nontender, bowel sounds active all four quadrants,     no masses, no hepatomegaly, no splenomegaly   Extremities:   Extremities normal, atraumatic, no cyanosis or edema                       .    Data Review:  Labs in chart were reviewed.  WBC   Date Value Ref Range Status   12/21/2024 27.40 (H) 3.40 - 10.80 10*3/mm3 Final     Hemoglobin   Date Value Ref Range Status   12/21/2024 10.7 (L) 12.0 - 15.9 g/dL Final     Hematocrit   Date Value Ref Range Status   12/21/2024 32.2 (L) 34.0 - 46.6 % Final     Platelets   Date Value Ref Range Status   12/21/2024 211 140 - 450 10*3/mm3 Final     Sodium   Date Value Ref Range Status   12/21/2024 130 (L) 136 - 145 mmol/L Final     Potassium   Date Value Ref Range Status   12/21/2024 4.9 3.5 - 5.2 mmol/L Final     Comment:     Slight hemolysis detected by analyzer. Result may be falsely elevated.     Chloride   Date Value Ref Range Status   12/21/2024 94 (L) 98 - 107 mmol/L Final     CO2   Date Value Ref Range Status   12/21/2024 25.0 22.0 - 29.0 mmol/L Final     BUN   Date Value Ref Range Status   12/21/2024 42 (H) 8 - 23 mg/dL Final     Creatinine   Date Value Ref Range Status   12/21/2024 1.79 (H) 0.57 - 1.00 mg/dL  Final     Glucose   Date Value Ref Range Status   12/21/2024 113 (H) 65 - 99 mg/dL Final     Calcium   Date Value Ref Range Status   12/21/2024 9.0 8.2 - 9.6 mg/dL Final                Imaging Results (All)       Procedure Component Value Units Date/Time    CT Abdomen Pelvis Without Contrast [[id number]] Collected: 12/21/24 1925     Updated: 12/21/24 1946    Narrative:      CT OF THE ABDOMEN/PELVIS WITHOUT CONTRAST     HISTORY: Weakness after ureteral stent placement     COMPARISON: October 20, 2024     TECHNIQUE: Axial CT imaging was obtained through the abdomen and pelvis.  No IV contrast was administered.     FINDINGS:  Images through the lung bases demonstrate some reticulonodular  infiltrates at the right lung base, which appears similar to October 2022. There is some atelectasis versus infiltrate noted at the left lung  base, new when compared to prior study. There is some noncalcified  pulmonary nodules noted at the left lung base which are stable compared  to 2022, and are benign. No suspicious hepatic lesions are seen. High  density material within the gallbladder may represent stones or sludge.  There is a small hiatal hernia. The duodenum and adrenal glands appear  normal. There is mild pancreatic atrophy. Calcified granulomata are seen  within the spleen. There is a double-J ureteral stent which is noted on  the left. A previously noted stone within the proximal left ureter is no  longer seen. Left-sided hydronephrosis has resolved. There is a 2 mm  nonobstructing stone which is noted within the inferior pole of the left  kidney. This was not clearly seen on the prior exam. There is some  perinephric and periureteral stranding on the left. Correlation with  urinalysis and urine cultures is recommended. No stones are noted on the  right. Air is noted within the urinary bladder, which may be related to  recent instrumentation. Intramedullary nicole is noted within the proximal  left femur. There are  aortoiliac calcifications. Uterus is grossly  unremarkable, given unenhanced technique. No adnexal masses are seen.  There is no bowel obstruction. There is colonic diverticulosis. There is  no evidence of appendicitis. There is some very subtle buckling of the  anterior cortex of S4 on the sagittal series. It is more apparent than  on recent CT from October 20, 2024. Nondisplaced fracture is not  excluded. No pelvic hematoma is seen.       Impression:         1. Interval placement of a double-J ureteral stent on the left.  Previously noted left ureteral stone is no longer seen. Hydronephrosis  has almost completely resolved. There is periureteral and perinephric  stranding on the left. Correlation with urinalysis and urine cultures is  recommended.  2. Atelectasis versus infiltrate noted at the left lung base.  3. There is some subtle buckling of the anterior cortex of S4 on the  sagittal series. It is more apparent than on prior exam. Nondisplaced  fracture is not excluded. No pelvic hematoma is seen.     Radiation dose reduction techniques were utilized, including automated  exposure control and exposure modulation based on body size.        This report was finalized on 12/21/2024 7:43 PM by Dr. Juana Guillermo M.D on Workstation: [id number]       XR Knee 1 or 2 View Left [[id number]] Collected: 12/21/24 1740     Updated: 12/21/24 1748    Narrative:      XR KNEE 1 OR 2 VW LEFT-     INDICATION: Post fall     COMPARISON: 3/14/2019       Impression:      Low bone mineralization limits evaluation. Within  limitation, no evidence of acute fracture. Normal alignment. No knee  joint effusion. Mild medial and lateral compartment narrowing.     Plate and screw fixation extending from the proximal metaphysis to the  distal metaphysis transfixing a healed prior femoral diaphyseal  fracture. Multiple cerclage wires are also present. Hardware is intact.  Additional intact proximal right intramedullary nicole with  dynamic  interlocking screw transfixing a prior partially visualized  intertrochanteric femur fracture..     This report was finalized on 12/21/2024 5:45 PM by Dr. Gus Corrales M.D on Workstation: [id number]                 Assessment:  Active Hospital Problems    Diagnosis  POA    JAI (acute kidney injury) [N17.9]  Yes      Resolved Hospital Problems   No resolved problems to display.   Hypertension  Anemia  Hyperglycemia  Right knee pain  Back pain    Plan:  Ask urology to see her  Antibiotic  Trend labs  Monitor on telemetry  Pain control  Pt/ot to see  Dw patient and ed provider    Oma Villafuerte MD  12/21/2024  22:27 EST

## 2024-12-23 LAB
ANION GAP SERPL CALCULATED.3IONS-SCNC: 10.5 MMOL/L (ref 5–15)
BUN SERPL-MCNC: 26 MG/DL (ref 8–23)
BUN/CREAT SERPL: 26 (ref 7–25)
CALCIUM SPEC-SCNC: 8 MG/DL (ref 8.2–9.6)
CHLORIDE SERPL-SCNC: 95 MMOL/L (ref 98–107)
CO2 SERPL-SCNC: 22.5 MMOL/L (ref 22–29)
CREAT SERPL-MCNC: 1 MG/DL (ref 0.57–1)
DEPRECATED RDW RBC AUTO: 38.9 FL (ref 37–54)
EGFRCR SERPLBLD CKD-EPI 2021: 53.3 ML/MIN/1.73
ERYTHROCYTE [DISTWIDTH] IN BLOOD BY AUTOMATED COUNT: 12.2 % (ref 12.3–15.4)
GLUCOSE SERPL-MCNC: 96 MG/DL (ref 65–99)
HCT VFR BLD AUTO: 29.9 % (ref 34–46.6)
HGB BLD-MCNC: 9.9 G/DL (ref 12–15.9)
MCH RBC QN AUTO: 29.2 PG (ref 26.6–33)
MCHC RBC AUTO-ENTMCNC: 33.1 G/DL (ref 31.5–35.7)
MCV RBC AUTO: 88.2 FL (ref 79–97)
PLATELET # BLD AUTO: 161 10*3/MM3 (ref 140–450)
PMV BLD AUTO: 10.1 FL (ref 6–12)
POTASSIUM SERPL-SCNC: 4.2 MMOL/L (ref 3.5–5.2)
RBC # BLD AUTO: 3.39 10*6/MM3 (ref 3.77–5.28)
SODIUM SERPL-SCNC: 128 MMOL/L (ref 136–145)
WBC NRBC COR # BLD AUTO: 12.77 10*3/MM3 (ref 3.4–10.8)

## 2024-12-23 PROCEDURE — 80048 BASIC METABOLIC PNL TOTAL CA: CPT | Performed by: STUDENT IN AN ORGANIZED HEALTH CARE EDUCATION/TRAINING PROGRAM

## 2024-12-23 PROCEDURE — 25010000002 ERTAPENEM PER 500 MG: Performed by: INTERNAL MEDICINE

## 2024-12-23 PROCEDURE — 99223 1ST HOSP IP/OBS HIGH 75: CPT | Performed by: INTERNAL MEDICINE

## 2024-12-23 PROCEDURE — 25810000003 LACTATED RINGERS PER 1000 ML: Performed by: STUDENT IN AN ORGANIZED HEALTH CARE EDUCATION/TRAINING PROGRAM

## 2024-12-23 PROCEDURE — 85027 COMPLETE CBC AUTOMATED: CPT | Performed by: STUDENT IN AN ORGANIZED HEALTH CARE EDUCATION/TRAINING PROGRAM

## 2024-12-23 RX ORDER — POLYETHYLENE GLYCOL 3350 17 G/17G
17 POWDER, FOR SOLUTION ORAL DAILY
Status: DISCONTINUED | OUTPATIENT
Start: 2024-12-23 | End: 2024-12-25 | Stop reason: HOSPADM

## 2024-12-23 RX ORDER — AMOXICILLIN 250 MG
1 CAPSULE ORAL 2 TIMES DAILY
Status: DISCONTINUED | OUTPATIENT
Start: 2024-12-23 | End: 2024-12-25 | Stop reason: HOSPADM

## 2024-12-23 RX ORDER — PHENAZOPYRIDINE HYDROCHLORIDE 100 MG/1
100 TABLET, FILM COATED ORAL
Status: DISPENSED | OUTPATIENT
Start: 2024-12-23 | End: 2024-12-25

## 2024-12-23 RX ORDER — SODIUM CHLORIDE, SODIUM LACTATE, POTASSIUM CHLORIDE, CALCIUM CHLORIDE 600; 310; 30; 20 MG/100ML; MG/100ML; MG/100ML; MG/100ML
75 INJECTION, SOLUTION INTRAVENOUS CONTINUOUS
Status: ACTIVE | OUTPATIENT
Start: 2024-12-23 | End: 2024-12-24

## 2024-12-23 RX ADMIN — PANTOPRAZOLE SODIUM 40 MG: 40 TABLET, DELAYED RELEASE ORAL at 06:35

## 2024-12-23 RX ADMIN — FLUTICASONE PROPIONATE 2 SPRAY: 50 SPRAY, METERED NASAL at 08:03

## 2024-12-23 RX ADMIN — SENNOSIDES AND DOCUSATE SODIUM 1 TABLET: 50; 8.6 TABLET ORAL at 11:56

## 2024-12-23 RX ADMIN — LIDOCAINE 1 PATCH: 42 PATCH PERCUTANEOUS; TOPICAL; TRANSDERMAL at 08:03

## 2024-12-23 RX ADMIN — ERTAPENEM SODIUM 1000 MG: 1 INJECTION, POWDER, LYOPHILIZED, FOR SOLUTION INTRAMUSCULAR; INTRAVENOUS at 17:20

## 2024-12-23 RX ADMIN — CALCIUM 500 MG: 500 TABLET ORAL at 08:03

## 2024-12-23 RX ADMIN — PROPRANOLOL HYDROCHLORIDE 40 MG: 40 TABLET ORAL at 20:59

## 2024-12-23 RX ADMIN — Medication 1 TABLET: at 06:35

## 2024-12-23 RX ADMIN — PHENAZOPYRIDINE 100 MG: 100 TABLET ORAL at 10:26

## 2024-12-23 RX ADMIN — HYDROCODONE BITARTRATE AND ACETAMINOPHEN 1 TABLET: 5; 325 TABLET ORAL at 20:59

## 2024-12-23 RX ADMIN — PROPRANOLOL HYDROCHLORIDE 40 MG: 40 TABLET ORAL at 08:03

## 2024-12-23 RX ADMIN — HYDROCODONE BITARTRATE AND ACETAMINOPHEN 1 TABLET: 5; 325 TABLET ORAL at 08:03

## 2024-12-23 RX ADMIN — PHENAZOPYRIDINE 100 MG: 100 TABLET ORAL at 17:20

## 2024-12-23 RX ADMIN — POLYETHYLENE GLYCOL 3350 17 G: 17 POWDER, FOR SOLUTION ORAL at 11:56

## 2024-12-23 RX ADMIN — SODIUM CHLORIDE, POTASSIUM CHLORIDE, SODIUM LACTATE AND CALCIUM CHLORIDE 75 ML/HR: 600; 310; 30; 20 INJECTION, SOLUTION INTRAVENOUS at 11:56

## 2024-12-23 RX ADMIN — SENNOSIDES AND DOCUSATE SODIUM 1 TABLET: 50; 8.6 TABLET ORAL at 20:59

## 2024-12-23 NOTE — SIGNIFICANT NOTE
12/23/24 1250   OTHER   Discipline occupational therapist   Rehab Time/Intention   Session Not Performed other (see comments)  (Nondisplaced S4 fracture  Possible small left patella fracture on CT. Pt now with an ortho consult. Will await for OT eval until cleared by ortho.)   Recommendation   OT - Next Appointment 12/24/24

## 2024-12-23 NOTE — PROGRESS NOTES
Name: Keyana Liz ADMIT: 2024   : 9/15/1933  PCP: Elizabeth Soto MD    MRN: [id number] LOS: 1 days   AGE/SEX: 89 y.o. female  ROOM: [facility]     Subjective   Subjective   Having little bit of pain, just received a pain pill and waiting for it to kick in.  A little bit of her breakfast.  Afebrile.  Friend at bedside.         Objective   Objective   Vital Signs  Temp:  [98.1 °F (36.7 °C)-99.7 °F (37.6 °C)] 98.8 °F (37.1 °C)  Heart Rate:  [71-80] 71  Resp:  [16-18] 16  BP: (111-132)/(52-68) 126/68  SpO2:  [96 %-99 %] 97 %  on  Flow (L/min) (Oxygen Therapy):  [1] 1;   Device (Oxygen Therapy): nasal cannula  Body mass index is 23.58 kg/m².  Physical Exam  Constitutional:       General: She is not in acute distress.     Appearance: She is not ill-appearing.      Comments: Elderly   Cardiovascular:      Rate and Rhythm: Normal rate and regular rhythm.   Pulmonary:      Effort: Pulmonary effort is normal. No respiratory distress.   Abdominal:      General: Abdomen is flat. There is no distension.      Tenderness: There is no abdominal tenderness.   Musculoskeletal:         General: No swelling or deformity. Normal range of motion.      Comments: Left knee tenderness to palpation   Skin:     General: Skin is warm and dry.   Neurological:      General: No focal deficit present.      Mental Status: She is alert. Mental status is at baseline.         Results Review     I reviewed the patient's new clinical results.  Results from last 7 days   Lab Units 24  0643 24  0653 24  1718   WBC 10*3/mm3 12.77* 19.21* 27.40*   HEMOGLOBIN g/dL 9.9* 9.1* 10.7*   PLATELETS 10*3/mm3 161 172 211     Results from last 7 days   Lab Units 24  0643 24  0653 24  1718   SODIUM mmol/L 128* 128* 130*   POTASSIUM mmol/L 4.2 4.0 4.9   CHLORIDE mmol/L 95* 91* 94*   CO2 mmol/L 22.5 24.1 25.0   BUN mg/dL 26* 39* 42*   CREATININE mg/dL 1.00 1.39* 1.79*   GLUCOSE mg/dL 96 105* 113*   Estimated  "Creatinine Clearance: 39.5 mL/min (by C-G formula based on SCr of 1 mg/dL).  Results from last 7 days   Lab Units 12/21/24  1718   ALBUMIN g/dL 3.5   BILIRUBIN mg/dL 0.4   ALK PHOS U/L 53   AST (SGOT) U/L 25   ALT (SGPT) U/L 7     Results from last 7 days   Lab Units 12/23/24  0643 12/22/24  0653 12/21/24  1718   CALCIUM mg/dL 8.0* 8.1* 9.0   ALBUMIN g/dL  --   --  3.5     Results from last 7 days   Lab Units 12/22/24  0023   LACTATE mmol/L 0.8     COVID19   Date Value Ref Range Status   11/07/2024 Not Detected Not Detected - Ref. Range Final   08/29/2024 Not Detected Not Detected - Ref. Range Final     No results found for: \"HGBA1C\", \"POCGLU\"    CT Pelvis Without Contrast  Narrative: CT PELVIS WO CONTRAST-     INDICATION: Fall, tailbone pain     COMPARISON: CT abdomen pelvis December 21, 2024     TECHNIQUE:  CT pelvis without IV contrast. Coronal and sagittal reformats. Radiation  dose reduction techniques were utilized, including automated exposure  control and exposure modulation based on body size.     FINDINGS:      Pelvis: Gas in the nondependent bladder lumen. Mild bladder distention.  No bladder calculus. Left ureteral stent uterus is present. No adnexal  mass. Small amount of free fluid seen in the cul-de-sac.     Bowel: Colonic diverticulosis. No obstruction.     Pelvic wall: Rectus diastases.     Retroperitoneum: No lymphadenopathy.     Vasculature: Moderate aortoiliac atherosclerotic calcification.     Osseous structures: Open reduction internal fixation of the left femur  with gamma nail and interlocking femoral neck dynamic compression screw  as well as plate and screw and cable fixation. No lucency around the  hardware. Evidence of old intertrochanteric and femoral diaphysis  fractures. Fracture line seen in the inferolateral left patella. Left  L5/S1 assimilation. Right L5/T1 pseudoarticulation. Lower lumbar facet  degenerative arthropathy with grade 1 anterolisthesis of L4 on L5.  Anterior cortical " buckling of the S4 segment of the sacrum is new from  CT performed October 20, 2024, stable from CT performed December 21, 2024, suspect nondisplaced fracture, with small amount of presacral  hemorrhage. Small left knee effusion.     Impression:    1. Nondisplaced fracture in the S4 segment of the sacrum with small  amount of presacral hemorrhage, appears stable in the interval.  2. Open reduction internal fixation of the left femur status post prior  intertrochanteric and femoral diaphysis fractures.  3. Small linear lucency seen across the inferior lateral margin of the  left patella, an age-indeterminate fracture. Correlation with  tenderness.  4. Small left knee effusion     This report was finalized on 12/22/2024 10:15 AM by Dr. Stanley Cook M.D on Workstation: [id number]       I reviewed the patient's daily medications.  Scheduled Medications  [Held by provider] amLODIPine, 5 mg, Oral, QAM  calcium carbonate (oyster shell), 500 mg, Oral, Daily  ertapenem, 1,000 mg, Intravenous, Q24H  fluticasone, 2 spray, Nasal, Daily  Lidocaine, 1 patch, Transdermal, Q24H  [Held by provider] lisinopril, 20 mg, Oral, QAM  multivitamin with minerals, 1 tablet, Oral, QAM  pantoprazole, 40 mg, Oral, Q AM  phenazopyridine, 100 mg, Oral, TID With Meals  polyethylene glycol, 17 g, Oral, Daily  propranolol, 40 mg, Oral, BID  senna-docusate sodium, 1 tablet, Oral, BID    Infusions  lactated ringers, 75 mL/hr      Diet  Diet: Cardiac; Healthy Heart (2-3 Na+); Fluid Consistency: Thin (IDDSI 0)         I have personally reviewed:  [x]  Laboratory   [x]  Microbiology   [x]  Radiology   []  EKG/Telemetry   []  Cardiology/Vascular   []  Pathology   [x]  Records     Assessment/Plan     Active Hospital Problems    Diagnosis  POA    **Bacteremia [R78.81]  Yes    JAI (acute kidney injury) [N17.9]  Yes    Acute UTI [N39.0]  Yes    Acute pyelonephritis [N10]  Yes    Hyponatremia [E87.1]  Yes      Resolved Hospital Problems   No resolved  problems to display.       89 y.o. female admitted with Bacteremia.    Urinary tract infection, concern for early left pyelonephritis  ESBL bacteremia  Nephrolithiasis with recent double-J ureteral left stent  -Urine and blood cultures pending  -Urology consulted, appreciate recommendations  -ID consulted-plan for 10-day course of IV ertapenem  -Start Pyridium now that her kidney function has returned to normal    JAI  Hyponatremia  -Creatinine resolved, sodium stable, continue IV fluids for now  -Hold lisinopril    Hyponatremia  -Continue fluids     Hypertension-hold amlodipine, lisinopril for now.  Continue propranolol    Nondisplaced S4 fracture  Possible small left patella fracture on CT  -Ortho consulted  -Continue pain control.  Start bowel regimen      SCDs for DVT prophylaxis.  Limited code (no CPR, no intubation).  Discussed with patient, family, and nursing staff. Attempted to call daughter.  Anticipate discharge home with HH vs SNU facility later this week.    Expected Discharge Date: 12/26/2024; Expected Discharge Time:       Yoan Cerda MD  [facility]  12/23/24  11:07 EST

## 2024-12-23 NOTE — PROGRESS NOTES
"  FIRST UROLOGY DAILY PROGRESS NOTE      Name: Keyana Liz  Age: 89 y.o.  Sex: female  :  9/15/1933  MRN: [id number]    Date: 2024             Subjective:  Interval History:   Stable    Objective:    Scheduled Meds:[Held by provider] amLODIPine, 5 mg, Oral, QAM  calcium carbonate (oyster shell), 500 mg, Oral, Daily  ertapenem, 1,000 mg, Intravenous, Q24H  fluticasone, 2 spray, Nasal, Daily  Lidocaine, 1 patch, Transdermal, Q24H  [Held by provider] lisinopril, 20 mg, Oral, QAM  multivitamin with minerals, 1 tablet, Oral, QAM  pantoprazole, 40 mg, Oral, Q AM  phenazopyridine, 100 mg, Oral, TID With Meals  polyethylene glycol, 17 g, Oral, Daily  propranolol, 40 mg, Oral, BID  senna-docusate sodium, 1 tablet, Oral, BID      Continuous Infusions:lactated ringers, 75 mL/hr      PRN Meds:  acetaminophen **OR** acetaminophen **OR** acetaminophen    albuterol    senna-docusate sodium **AND** polyethylene glycol **AND** bisacodyl **AND** bisacodyl    carBAMazepine    HYDROcodone-acetaminophen    melatonin    ondansetron ODT **OR** ondansetron    Vital signs in last 24 hours:  Temp:  [98.1 °F (36.7 °C)-99.7 °F (37.6 °C)] 98.8 °F (37.1 °C)  Heart Rate:  [71-80] 71  Resp:  [16-18] 16  BP: (111-132)/(52-68) 126/68    Intake/Output:    Intake/Output Summary (Last 24 hours) at 2024 1129  Last data filed at 2024 0742  Gross per 24 hour   Intake 480 ml   Output 900 ml   Net -420 ml       Exam:  /68 (BP Location: Right arm, Patient Position: Lying)   Pulse 71   Temp 98.8 °F (37.1 °C) (Oral)   Resp 16   Ht 170.2 cm (67\")   Wt 68.3 kg (150 lb 9.2 oz)   SpO2 97%   BMI 23.58 kg/m²     No distress  Nasal cannula     Data Review:  All labs (24hrs):   Recent Results (from the past 24 hours)   CBC (No Diff)    Collection Time: 24  6:43 AM    Specimen: Blood   Result Value Ref Range    WBC 12.77 (H) 3.40 - 10.80 10*3/mm3    RBC 3.39 (L) 3.77 - 5.28 10*6/mm3    Hemoglobin 9.9 (L) 12.0 - 15.9 g/dL "    Hematocrit 29.9 (L) 34.0 - 46.6 %    MCV 88.2 79.0 - 97.0 fL    MCH 29.2 26.6 - 33.0 pg    MCHC 33.1 31.5 - 35.7 g/dL    RDW 12.2 (L) 12.3 - 15.4 %    RDW-SD 38.9 37.0 - 54.0 fl    MPV 10.1 6.0 - 12.0 fL    Platelets 161 140 - 450 10*3/mm3   Basic Metabolic Panel    Collection Time: 12/23/24  6:43 AM    Specimen: Blood   Result Value Ref Range    Glucose 96 65 - 99 mg/dL    BUN 26 (H) 8 - 23 mg/dL    Creatinine 1.00 0.57 - 1.00 mg/dL    Sodium 128 (L) 136 - 145 mmol/L    Potassium 4.2 3.5 - 5.2 mmol/L    Chloride 95 (L) 98 - 107 mmol/L    CO2 22.5 22.0 - 29.0 mmol/L    Calcium 8.0 (L) 8.2 - 9.6 mg/dL    BUN/Creatinine Ratio 26.0 (H) 7.0 - 25.0    Anion Gap 10.5 5.0 - 15.0 mmol/L    eGFR 53.3 (L) >60.0 mL/min/1.73          Assessment:    Bacteremia    Hyponatremia    Acute pyelonephritis    Acute UTI    JAI (acute kidney injury)    JAI   UTI/Left pyelo  Left hydronephrosis  Left Ureteral stone - s/p URS, Left stent change 12/20/2024  Bacteremia     Urine Culture++  Blood Cultures ++ Klebsiella      Plan:   - Await final cultures   - Continue IV Abx per ID recs   - Limit bladder irritants   - Agree that Pyridium is okay to use   - Maintain Left ureteral stent  - Agree with Infectious Disease Consultation for guidance on Antibiotic plan  - Urology will plan on Stent removal in approximately 1 week - Urology schedulers messaged   - Urology will follow peripherally       Arnel Aponte MD  12/23/2024  11:29 EST

## 2024-12-23 NOTE — PROGRESS NOTES
Discharge Planning Assessment  [facility]     Patient Name: Keyana Liz  MRN: [id number]  Today's Date: 12/23/2024    Admit Date: 12/21/2024    Plan: [facility] skilled rehab; will need Midline Catheter prior to discharge. Transportation arranged for Wednesday 25th at 11am with [facility]   Discharge Needs Assessment    No documentation.                  Discharge Plan       Row Name 12/23/24 1616       Plan    Plan [facility] skilled rehab; will need Midline Catheter prior to discharge. Transportation arranged for Wednesday 25th at 11am with [facility]    Plan Comments Spoke with daughter Faviola [phone] she is agreeable with skilled rehab at [facility] and transported by [facility] on Wednesday 12/25/2024 at 11am. Tony RODRIGUEZ      Row Name 12/23/24 5545       Plan    Plan [facility] skilled rehab can accept on Wednesday 12/25 after 11am    Plan Comments Spoke with patient at bedside for discharge planning, she will need skilled rehab and will be receiving IV antiboitic at rehab and will need a midline catheter. She wants to go to [facility] skilled rehab. Call placed to [facility] [phone], per [facility] patient has been accepted at [facility] and will have a bed Wednesday 12/25 after 11am. Call placed to her daughter Faviola  [phone]  no answer, left voice message. Patient stated she daughter will transport at discharge. Packet and pharmacy updated  Tony RODRIGUEZ                  Continued Care and Services - Admitted Since 12/21/2024       Destination Coordination complete.      Service Provider Request Status Services Address Phone Fax Patient Preferred    [facility]  Selected Skilled Nursing [address] [address] [phone] --                  Expected Discharge Date and Time       Expected Discharge Date Expected Discharge Time    Dec 25, 2024            Demographic Summary    No documentation.                   Functional Status    No documentation.                  Psychosocial    No documentation.                  Abuse/Neglect    No documentation.                  Legal    No documentation.                  Substance Abuse    No documentation.                  Patient Forms    No documentation.                     Dulce Becerril RN

## 2024-12-23 NOTE — CONSULTS
"Referring Provider: Dr Cerda    Reason for Consultation: ESBL bacteremia     History of present illness:  Keyana Liz is a 89 y.o. who I am asked to evaluate and give opinion for \"ESBL bacteremia.\" History is obtained from the patient and review of the old medical records which I summarize/synthesize as follows: She had a recent admission October 20 through October 23, 2024 at which time she was admitted with left lower quadrant pain, fever, chills and found to have a left ureteral stone and acute pyelonephritis.  Urology was consulted and she went for cystoscopy with stent placement.  Her urine culture was positive for E. coli.  She was discharged on cephalexin.  Then on 12/19/2024, she was brought in for left ureteroscopy and laser lithotripsy with stent removal and replacement.      She presented to the ER on 12/21 with right knee and sacral pain following a fall.  She reported generalized weakness ever since her lithotripsy procedure.  She had a CT of the abdomen pelvis that showed marked improvement in her  tract findings compared to prior.  She was found to have a nondisplaced S4 fracture.  She was afebrile with a normal heart rate and low-normal blood pressure.  Labs are notable for creatinine 1.8 and WBC 27.  UA showed too numerous to count WBCs.  She was started on ceftriaxone.  Last evening her blood cultures turned positive for ESBL Klebsiella pneumoniae so she was changed to ertapenem and ID has been consulted.    Past Medical History:   Diagnosis Date    Acute bronchitis     Allergic rhinitis     Analgesic overuse headache     Arthritis     Benign essential hypertension     Benign paroxysmal positional vertigo     Cancer     Chronic renal insufficiency     Cluster headache     Constipation     Cough     Dehydration     Dizziness     Dysuria     Fall at home     Fatigue     Hypertension     Hypertension, uncontrolled     Hypertensive urgency     Increased frequency of urination     Kidney " stones     Osteoporosis     Otitis media     Postmenopausal HRT (hormone replacement therapy)     Short-term memory loss     Shoulder pain, left     Sleep apnea     no machine    TACS (trigeminal autonomic cephalgias)     Urinary frequency     UTI symptoms     Vertigo     Vitamin B12 deficiency        Past Surgical History:   Procedure Laterality Date    CYSTOSCOPY W/ URETERAL STENT PLACEMENT N/A 10/20/2024    Procedure: CYSTOSCOPY URETERAL CATHETER/STENT INSERTION;  Surgeon: Maverick Park MD;  Location: [facility] MAIN OR;  Service: Urology;  Laterality: N/A;    EXTRACORPOREAL SHOCK WAVE LITHOTRIPSY (ESWL) Left 11/4/2024    Procedure: LEFT SHOCKWAVE LITHOTRIPSY;  Surgeon: Georgi Patel MD;  Location: [facility] MAIN OR;  Service: Urology;  Laterality: Left;    EXTRACORPOREAL SHOCK WAVE LITHOTRIPSY (ESWL) Left 11/20/2024    Procedure: LEFT URETERAL  SHOCKWAVE LITHOTRIPSY;  Surgeon: Maverick Park MD;  Location: [facility] OR;  Service: Urology;  Laterality: Left;    FEMUR FRACTURE SURGERY Left     FEMUR IM NAILING/RODDING Left 3/16/2019    Procedure: Fixation of periprosthetic femur fracture;  Surgeon: Lauro Friedman MD;  Location: [facility] MAIN OR;  Service: Orthopedics    LUMBAR EPIDURAL INJECTION N/A 10/27/2021    Procedure: LUMBAR EPIDURAL 1ST VISIT Lumbar 5-sacral 1;  Surgeon: Brittani Fleming MD;  Location: [facility] MA[facility] OR;  Service: Pain Management;  Laterality: N/A;    LUMBAR EPIDURAL INJECTION N/A 12/13/2021    Procedure: lumbar epidural steroid injection;  Surgeon: Brittani Fleming MD;  Location: [facility] MAIN OR;  Service: Pain Management;  Laterality: N/A;    MASTECTOMY      URETEROSCOPY LASER LITHOTRIPSY WITH STENT INSERTION Left 12/19/2024    Procedure: LEFT URETEROSCOPY LASER LITHOTRIPSY WITH STENT REMOVAL AND REPLACEMENT;  Surgeon: Maverick Park MD;  Location: [facility] MAIN OR;  Service: Urology;  Laterality: Left;       Social History:  Lives in [place] by herself  She has a good  friend from [place] who helps her as needed    Antibiotic allergies and intolerances:  None    Medications:    Current Facility-Administered Medications:     acetaminophen (TYLENOL) tablet 650 mg, 650 mg, Oral, Q4H PRN **OR** acetaminophen (TYLENOL) 160 MG/5ML oral solution 650 mg, 650 mg, Oral, Q4H PRN **OR** acetaminophen (TYLENOL) suppository 650 mg, 650 mg, Rectal, Q4H PRN, Oma Villafuerte MD    albuterol (PROVENTIL) nebulizer solution 0.083% 2.5 mg/3mL, 2.5 mg, Nebulization, Q6H PRN, Oma Villafuerte MD    [Held by provider] amLODIPine (NORVASC) tablet 5 mg, 5 mg, Oral, QAM, Oma Villafuerte MD, 5 mg at 12/22/24 0657    sennosides-docusate (PERICOLACE) 8.6-50 MG per tablet 2 tablet, 2 tablet, Oral, BID PRN **AND** polyethylene glycol (MIRALAX) packet 17 g, 17 g, Oral, Daily PRN **AND** bisacodyl (DULCOLAX) EC tablet 5 mg, 5 mg, Oral, Daily PRN **AND** bisacodyl (DULCOLAX) suppository 10 mg, 10 mg, Rectal, Daily PRN, Oma Villafuerte MD    calcium carbonate (oyster shell) tablet 500 mg, 500 mg, Oral, Daily, Oma Villafuerte MD, 500 mg at 12/23/24 0803    carBAMazepine (TEGretol) chewable tablet 100 mg, 100 mg, Oral, Daily PRN, Oma Villafuerte MD    ertapenem (INVanz) 1,000 mg in sodium chloride 0.9 % 100 mL MBP, 1,000 mg, Intravenous, Q24H, Yoan Cerda MD    fluticasone (FLONASE) 50 MCG/ACT nasal spray 2 spray, 2 spray, Nasal, Daily, Oma Villafuerte MD, 2 spray at 12/23/24 0803    HYDROcodone-acetaminophen (NORCO) 5-325 MG per tablet 1 tablet, 1 tablet, Oral, Q6H PRN, Oma Villafuerte MD, 1 tablet at 12/23/24 0803    Lidocaine 4 % 1 patch, 1 patch, Transdermal, Q24H, Oma Villafuerte MD, 1 patch at 12/23/24 0803    [Held by provider] lisinopril (PRINIVIL,ZESTRIL) tablet 20 mg, 20 mg, Oral, QAM, Oma Villafuerte MD    melatonin tablet 2.5 mg, 2.5 mg, Oral, Nightly PRN, Oma Villafuerte MD    multivitamin with minerals 1 tablet, 1 tablet, Oral,  Christo MARS Renate Andrea, MD, 1 tablet at 12/23/24 0635    ondansetron ODT (ZOFRAN-ODT) disintegrating tablet 4 mg, 4 mg, Oral, Q6H PRN **OR** ondansetron (ZOFRAN) injection 4 mg, 4 mg, Intravenous, Q6H PRN, Oma Villafuerte MD    pantoprazole (PROTONIX) EC tablet 40 mg, 40 mg, Oral, Q AM, Oma Villafuerte MD, 40 mg at 12/23/24 0635    phenazopyridine (PYRIDIUM) tablet 100 mg, 100 mg, Oral, TID With Meals, Yoan Cerda MD, 100 mg at 12/23/24 1026    propranolol (INDERAL) tablet 40 mg, 40 mg, Oral, BID, Oma Villafuerte MD, 40 mg at 12/23/24 0803      Objective   Vital Signs   Temp:  [98.1 °F (36.7 °C)-99.7 °F (37.6 °C)] 98.8 °F (37.1 °C)  Heart Rate:  [71-80] 71  Resp:  [16-18] 16  BP: (111-132)/(52-68) 126/68    Physical Exam:   General: awake, alert, NAD, very nice, sitting up in bed  Eyes: no scleral icterus  Cardiovascular: Normal rate  Respiratory: normal work of breathing without wheezing  GI: Abdomen is soft, not tender  : External urinary catheter  Neurological: Alert and oriented x 3  Psychiatric: Normal mood and affect   Vasc: PIV w/o erythema    Labs:     Lab Results   Component Value Date    WBC 12.77 (H) 12/23/2024    HGB 9.9 (L) 12/23/2024    HCT 29.9 (L) 12/23/2024    MCV 88.2 12/23/2024     12/23/2024       Lab Results   Component Value Date    GLUCOSE 96 12/23/2024    BUN 26 (H) 12/23/2024    CREATININE 1.00 12/23/2024    EGFRIFNONA 60 10/20/2021    EGFRIFAFRI 69 10/20/2021    BCR 26.0 (H) 12/23/2024    CO2 22.5 12/23/2024    CALCIUM 8.0 (L) 12/23/2024    PROTENTOTREF 6.6 09/16/2024    ALBUMIN 3.5 12/21/2024    LABIL2 1.9 09/16/2024    AST 25 12/21/2024    ALT 7 12/21/2024     UA TNTC WBCs, 11-20 RBCs, 13-20 squamous cells, large LE, negative nitrite      Microbiology:  12/21 UCx: 50k GNR  12/22 BCx: ESBL Kleb pneumo      Radiology:  12/22 CT pelvis with nondisplaced fracture of S4    12/21 CT abdomen pelvis with double-J ureteral stent on the left side.  Previously  noted left ureteral stone is no longer seen.  Hydronephrosis has almost completely resolved.  There is periureteral and perinephric stranding on the left this is my summary of the radiology report.    ASSESSMENT/PLAN:  ESBL Kleb pneumo septicemia due to  source  S/P ureteral stent exchange  Hyponatremia  Nondisplaced fracture S4    I recommend that she continue ertapenem 1 g IV every 24 hours through 12/31/2024 to complete a 10-day total course.  She says she is anticipating going to rehab.  Hopefully her rehab facility will accept a midline and she can complete her course of antibiotics there.  I will discuss with  regarding the midline question.  I did already discussed with  the need for antibiotics at discharge    Check CBC and CMP in the a.m. for close monitoring.    I will check on her again tomorrow.

## 2024-12-23 NOTE — PLAN OF CARE
Goal Outcome Evaluation:    Problem: Adult Inpatient Plan of Care  Goal: Absence of Hospital-Acquired Illness or Injury  Intervention: Identify and Manage Fall Risk  Recent Flowsheet Documentation  Taken 12/22/2024 1800 by Lisa Ron RN  Safety Promotion/Fall Prevention: safety round/check completed  Taken 12/22/2024 1631 by Lisa Ron RN  Safety Promotion/Fall Prevention: safety round/check completed  Taken 12/22/2024 1400 by Lisa Ron RN  Safety Promotion/Fall Prevention: safety round/check completed  Taken 12/22/2024 1236 by Lisa Ron RN  Safety Promotion/Fall Prevention: safety round/check completed  Taken 12/22/2024 1000 by Lisa Ron RN  Safety Promotion/Fall Prevention: safety round/check completed  Taken 12/22/2024 0828 by Lisa Ron RN  Safety Promotion/Fall Prevention: safety round/check completed   Plan of Care Reviewed With: patient alert, room air, started Ivanz, notified Thelma DAVE pt had 8 beats SVT to ST run unstained, asymptomatic. Blood cultures positive, call light within reach.

## 2024-12-23 NOTE — PROGRESS NOTES
Discharge Planning Assessment  [facility]     Patient Name: Keyana Liz  MRN: [id number]  Today's Date: 12/23/2024    Admit Date: 12/21/2024    Plan: [facility] skilled rehab can accept on Wednesday 12/25 after 11am, as she has to have 3 midnights to qualify for skilled rehab with Medicare    Discharge Needs Assessment    No documentation.                  Discharge Plan       Row Name 12/23/24 1342       Plan    Plan [facility] skilled rehab can accept on Wednesday 12/25 after 11am. With a Midline    Plan Comments Spoke with patient at bedside for discharge planning, she will need skilled rehab and will be receiving IV antiboitic at rehab and will need a midline catheter. She wants to go to [facility] skilled rehab. Call placed to [facility] [phone], per [facility] patient has been accepted at [facility] and will have a bed Wednesday 12/25 after 11am. Call placed to her daughter Faviola  [phone]  no answer, left voice message. Patient stated she daughter will transport at discharge. Packet and pharmacy updated  Tony RODRIGUEZ                  Continued Care and Services - Admitted Since 12/21/2024       Destination       Service Provider Request Status Services Address Phone Fax Patient Preferred    [facility] Accepted -- [address] [place] [id number] [phone] --                  Expected Discharge Date and Time       Expected Discharge Date Expected Discharge Time    Dec 26, 2024            Demographic Summary    No documentation.                  Functional Status    No documentation.                  Psychosocial    No documentation.                  Abuse/Neglect    No documentation.                  Legal    No documentation.                  Substance Abuse    No documentation.                  Patient Forms    No documentation.                     Dulce Becerril, RN

## 2024-12-23 NOTE — PLAN OF CARE
Problem: Adult Inpatient Plan of Care  Goal: Plan of Care Review  Outcome: Progressing  Flowsheets (Taken 12/23/2024 0621)  Progress: improving  Plan of Care Reviewed With: patient  Goal: Patient-Specific Goal (Individualized)  Outcome: Progressing  Goal: Absence of Hospital-Acquired Illness or Injury  Outcome: Progressing  Intervention: Identify and Manage Fall Risk  Recent Flowsheet Documentation  Taken 12/23/2024 0621 by eMg Kiran RN  Safety Promotion/Fall Prevention: safety round/check completed  Taken 12/23/2024 0403 by Meg Kiran RN  Safety Promotion/Fall Prevention: safety round/check completed  Taken 12/23/2024 0204 by Meg Kiran RN  Safety Promotion/Fall Prevention: safety round/check completed  Taken 12/23/2024 0035 by Meg Kiran RN  Safety Promotion/Fall Prevention: safety round/check completed  Taken 12/22/2024 2236 by Meg Kiran RN  Safety Promotion/Fall Prevention: safety round/check completed  Taken 12/22/2024 2008 by Meg Kiran RN  Safety Promotion/Fall Prevention:   safety round/check completed   room organization consistent   nonskid shoes/slippers when out of bed   fall prevention program maintained  Intervention: Prevent Skin Injury  Recent Flowsheet Documentation  Taken 12/23/2024 0621 by Meg Kiran RN  Body Position: position changed independently  Taken 12/23/2024 0403 by Meg Kiran RN  Body Position: position changed independently  Taken 12/23/2024 0204 by Meg Kiran RN  Body Position: position changed independently  Taken 12/23/2024 0035 by Meg Kiran RN  Body Position: position changed independently  Taken 12/22/2024 2008 by Meg Kiran RN  Body Position: position changed independently  Intervention: Prevent Infection  Recent Flowsheet Documentation  Taken 12/23/2024 0035 by Meg Kiran RN  Infection Prevention: single patient room provided  Taken 12/22/2024 2008 by  Meg Kiran, RN  Infection Prevention: single patient room provided  Goal: Optimal Comfort and Wellbeing  Outcome: Progressing  Intervention: Monitor Pain and Promote Comfort  Recent Flowsheet Documentation  Taken 12/22/2024 2008 by Meg Kiran RN  Pain Management Interventions: pain medication given  Intervention: Provide Person-Centered Care  Recent Flowsheet Documentation  Taken 12/23/2024 0035 by Meg Kiran RN  Trust Relationship/Rapport: care explained  Taken 12/22/2024 2008 by Meg Kiran RN  Trust Relationship/Rapport:   care explained   choices provided   questions answered   questions encouraged  Goal: Readiness for Transition of Care  Outcome: Progressing     Problem: Comorbidity Management  Goal: Blood Pressure in Desired Range  Outcome: Progressing  Intervention: Maintain Blood Pressure Management  Recent Flowsheet Documentation  Taken 12/22/2024 2008 by Meg Kiran RN  Medication Review/Management: medications reviewed     Problem: Infection  Goal: Absence of Infection Signs and Symptoms  Outcome: Progressing  Intervention: Prevent or Manage Infection  Recent Flowsheet Documentation  Taken 12/22/2024 2008 by Meg Kiran RN  Isolation Precautions:   precautions maintained   contact     Problem: Acute Kidney Injury/Impairment  Goal: Fluid and Electrolyte Balance  Outcome: Progressing  Goal: Effective Renal Function  Outcome: Progressing  Intervention: Monitor and Support Renal Function  Recent Flowsheet Documentation  Taken 12/22/2024 2008 by Meg Kiran RN  Medication Review/Management: medications reviewed     Problem: Skin Injury Risk Increased  Goal: Skin Health and Integrity  Outcome: Progressing  Intervention: Optimize Skin Protection  Recent Flowsheet Documentation  Taken 12/23/2024 0621 by Meg Kiran, RN  Head of Bed (hospitals) Positioning: HOB elevated  Taken 12/23/2024 0403 by Meg Kiran RN  Head of Bed (HOB)  Positioning: HOB elevated  Taken 12/23/2024 0204 by Meg Kiran RN  Activity Management: activity encouraged  Head of Bed (HOB) Positioning: HOB elevated  Taken 12/23/2024 0035 by Meg Kiran RN  Activity Management: activity encouraged  Pressure Reduction Techniques: frequent weight shift encouraged  Head of Bed (HOB) Positioning: HOB elevated  Taken 12/22/2024 2236 by Meg Kiran RN  Head of Bed (HOB) Positioning: HOB elevated  Taken 12/22/2024 2008 by Meg Kiran RN  Activity Management: activity encouraged  Pressure Reduction Techniques: frequent weight shift encouraged  Head of Bed (HOB) Positioning: HOB elevated     Problem: Fall Injury Risk  Goal: Absence of Fall and Fall-Related Injury  Outcome: Progressing  Intervention: Identify and Manage Contributors  Recent Flowsheet Documentation  Taken 12/23/2024 0035 by Meg Kiran RN  Self-Care Promotion:   independence encouraged   BADL personal objects within reach  Taken 12/22/2024 2008 by Meg Kiran RN  Medication Review/Management: medications reviewed  Self-Care Promotion:   independence encouraged   BADL personal objects within reach  Intervention: Promote Injury-Free Environment  Recent Flowsheet Documentation  Taken 12/23/2024 0621 by Meg Kiran RN  Safety Promotion/Fall Prevention: safety round/check completed  Taken 12/23/2024 0403 by Meg Kiran RN  Safety Promotion/Fall Prevention: safety round/check completed  Taken 12/23/2024 0204 by Meg Kiran RN  Safety Promotion/Fall Prevention: safety round/check completed  Taken 12/23/2024 0035 by Meg Kiran RN  Safety Promotion/Fall Prevention: safety round/check completed  Taken 12/22/2024 2236 by Meg Kiran RN  Safety Promotion/Fall Prevention: safety round/check completed  Taken 12/22/2024 2008 by Meg Kiran RN  Safety Promotion/Fall Prevention:   safety round/check completed   room organization  consistent   nonskid shoes/slippers when out of bed   fall prevention program maintained   Goal Outcome Evaluation:  Plan of Care Reviewed With: patient        Progress: improving  Outcome Evaluation: Patient A&Ox4.

## 2024-12-23 NOTE — PLAN OF CARE
Goal Outcome Evaluation:    Problem: Adult Inpatient Plan of Care  Goal: Absence of Hospital-Acquired Illness or Injury  Intervention: Identify and Manage Fall Risk  Recent Flowsheet Documentation  Taken 12/23/2024 1615 by Lisa Ron RN  Safety Promotion/Fall Prevention: safety round/check completed  Taken 12/23/2024 1427 by Lisa Ron RN  Safety Promotion/Fall Prevention: safety round/check completed  Taken 12/23/2024 1257 by Lisa Ron RN  Safety Promotion/Fall Prevention: safety round/check completed  Taken 12/23/2024 0803 by Lisa Ron RN  Safety Promotion/Fall Prevention:   safety round/check completed   toileting scheduled   nonskid shoes/slippers when out of bed   fall prevention program maintained   clutter free environment maintained   activity supervised   Plan of Care Reviewed With: patient alert, 1 L nasal cannula, continues IV ABX therapy, on Lactated ringers, isolation maintained,call light within reach, c/o of left knee pain, gave prn medication.

## 2024-12-23 NOTE — SIGNIFICANT NOTE
12/23/24 1303   OTHER   Discipline physical therapist   Rehab Time/Intention   Session Not Performed unable to treat, medical status change  (Noted per chart possible small left patella fracture on CT and Ortho consulted. Will await Ortho consult and recs prior to resuming PT. PT to follow up.)   Recommendation   PT - Next Appointment 12/24/24

## 2024-12-24 LAB
ANION GAP SERPL CALCULATED.3IONS-SCNC: 5.2 MMOL/L (ref 5–15)
BACTERIA SPEC AEROBE CULT: ABNORMAL
BUN SERPL-MCNC: 19 MG/DL (ref 8–23)
BUN/CREAT SERPL: 24.7 (ref 7–25)
CALCIUM SPEC-SCNC: 7.7 MG/DL (ref 8.2–9.6)
CHLORIDE SERPL-SCNC: 98 MMOL/L (ref 98–107)
CO2 SERPL-SCNC: 23.8 MMOL/L (ref 22–29)
CREAT SERPL-MCNC: 0.77 MG/DL (ref 0.57–1)
DEPRECATED RDW RBC AUTO: 40.2 FL (ref 37–54)
EGFRCR SERPLBLD CKD-EPI 2021: 72.9 ML/MIN/1.73
ERYTHROCYTE [DISTWIDTH] IN BLOOD BY AUTOMATED COUNT: 12.4 % (ref 12.3–15.4)
GLUCOSE SERPL-MCNC: 98 MG/DL (ref 65–99)
HCT VFR BLD AUTO: 28.3 % (ref 34–46.6)
HGB BLD-MCNC: 9.2 G/DL (ref 12–15.9)
MCH RBC QN AUTO: 29 PG (ref 26.6–33)
MCHC RBC AUTO-ENTMCNC: 32.5 G/DL (ref 31.5–35.7)
MCV RBC AUTO: 89.3 FL (ref 79–97)
PLATELET # BLD AUTO: 163 10*3/MM3 (ref 140–450)
PMV BLD AUTO: 10 FL (ref 6–12)
POTASSIUM SERPL-SCNC: 4.2 MMOL/L (ref 3.5–5.2)
RBC # BLD AUTO: 3.17 10*6/MM3 (ref 3.77–5.28)
SODIUM SERPL-SCNC: 127 MMOL/L (ref 136–145)
WBC NRBC COR # BLD AUTO: 10.78 10*3/MM3 (ref 3.4–10.8)

## 2024-12-24 PROCEDURE — 99232 SBSQ HOSP IP/OBS MODERATE 35: CPT | Performed by: INTERNAL MEDICINE

## 2024-12-24 PROCEDURE — 85027 COMPLETE CBC AUTOMATED: CPT | Performed by: STUDENT IN AN ORGANIZED HEALTH CARE EDUCATION/TRAINING PROGRAM

## 2024-12-24 PROCEDURE — 80048 BASIC METABOLIC PNL TOTAL CA: CPT | Performed by: STUDENT IN AN ORGANIZED HEALTH CARE EDUCATION/TRAINING PROGRAM

## 2024-12-24 PROCEDURE — 87040 BLOOD CULTURE FOR BACTERIA: CPT | Performed by: STUDENT IN AN ORGANIZED HEALTH CARE EDUCATION/TRAINING PROGRAM

## 2024-12-24 PROCEDURE — 25010000002 ERTAPENEM PER 500 MG: Performed by: INTERNAL MEDICINE

## 2024-12-24 PROCEDURE — C1751 CATH, INF, PER/CENT/MIDLINE: HCPCS

## 2024-12-24 PROCEDURE — 36410 VNPNXR 3YR/> PHY/QHP DX/THER: CPT

## 2024-12-24 PROCEDURE — 25810000003 LACTATED RINGERS PER 1000 ML: Performed by: STUDENT IN AN ORGANIZED HEALTH CARE EDUCATION/TRAINING PROGRAM

## 2024-12-24 RX ORDER — POLYETHYLENE GLYCOL 3350 17 G/17G
17 POWDER, FOR SOLUTION ORAL DAILY
Start: 2024-12-25

## 2024-12-24 RX ORDER — HYDROCODONE BITARTRATE AND ACETAMINOPHEN 5; 325 MG/1; MG/1
1 TABLET ORAL EVERY 8 HOURS PRN
Qty: 9 TABLET | Refills: 0 | Status: SHIPPED | OUTPATIENT
Start: 2024-12-24 | End: 2024-12-27

## 2024-12-24 RX ORDER — AMOXICILLIN 250 MG
1 CAPSULE ORAL 2 TIMES DAILY
Start: 2024-12-24 | End: 2025-01-24

## 2024-12-24 RX ORDER — SODIUM CHLORIDE 0.9 % (FLUSH) 0.9 %
10 SYRINGE (ML) INJECTION AS NEEDED
Status: DISCONTINUED | OUTPATIENT
Start: 2024-12-24 | End: 2024-12-25 | Stop reason: HOSPADM

## 2024-12-24 RX ORDER — SODIUM CHLORIDE 9 MG/ML
40 INJECTION, SOLUTION INTRAVENOUS AS NEEDED
Status: DISCONTINUED | OUTPATIENT
Start: 2024-12-24 | End: 2024-12-25 | Stop reason: HOSPADM

## 2024-12-24 RX ORDER — SODIUM CHLORIDE 0.9 % (FLUSH) 0.9 %
10 SYRINGE (ML) INJECTION EVERY 12 HOURS SCHEDULED
Status: DISCONTINUED | OUTPATIENT
Start: 2024-12-24 | End: 2024-12-25 | Stop reason: HOSPADM

## 2024-12-24 RX ORDER — PHENAZOPYRIDINE HYDROCHLORIDE 100 MG/1
100 TABLET, FILM COATED ORAL
Qty: 3 TABLET | Refills: 0 | Status: SHIPPED | OUTPATIENT
Start: 2024-12-24 | End: 2024-12-25

## 2024-12-24 RX ADMIN — SENNOSIDES AND DOCUSATE SODIUM 1 TABLET: 50; 8.6 TABLET ORAL at 08:05

## 2024-12-24 RX ADMIN — PHENAZOPYRIDINE 100 MG: 100 TABLET ORAL at 11:52

## 2024-12-24 RX ADMIN — PROPRANOLOL HYDROCHLORIDE 40 MG: 40 TABLET ORAL at 08:05

## 2024-12-24 RX ADMIN — ERTAPENEM SODIUM 1000 MG: 1 INJECTION, POWDER, LYOPHILIZED, FOR SOLUTION INTRAMUSCULAR; INTRAVENOUS at 17:05

## 2024-12-24 RX ADMIN — HYDROCODONE BITARTRATE AND ACETAMINOPHEN 1 TABLET: 5; 325 TABLET ORAL at 14:40

## 2024-12-24 RX ADMIN — Medication 10 ML: at 20:57

## 2024-12-24 RX ADMIN — Medication 10 ML: at 11:53

## 2024-12-24 RX ADMIN — LIDOCAINE 1 PATCH: 42 PATCH PERCUTANEOUS; TOPICAL; TRANSDERMAL at 08:05

## 2024-12-24 RX ADMIN — PROPRANOLOL HYDROCHLORIDE 40 MG: 40 TABLET ORAL at 20:55

## 2024-12-24 RX ADMIN — PANTOPRAZOLE SODIUM 40 MG: 40 TABLET, DELAYED RELEASE ORAL at 07:52

## 2024-12-24 RX ADMIN — CALCIUM 500 MG: 500 TABLET ORAL at 08:05

## 2024-12-24 RX ADMIN — Medication 1 TABLET: at 07:52

## 2024-12-24 RX ADMIN — PHENAZOPYRIDINE 100 MG: 100 TABLET ORAL at 17:05

## 2024-12-24 RX ADMIN — SENNOSIDES AND DOCUSATE SODIUM 1 TABLET: 50; 8.6 TABLET ORAL at 20:55

## 2024-12-24 RX ADMIN — PHENAZOPYRIDINE 100 MG: 100 TABLET ORAL at 08:05

## 2024-12-24 RX ADMIN — HYDROCODONE BITARTRATE AND ACETAMINOPHEN 1 TABLET: 5; 325 TABLET ORAL at 20:55

## 2024-12-24 RX ADMIN — POLYETHYLENE GLYCOL 3350 17 G: 17 POWDER, FOR SOLUTION ORAL at 08:05

## 2024-12-24 RX ADMIN — SODIUM CHLORIDE, POTASSIUM CHLORIDE, SODIUM LACTATE AND CALCIUM CHLORIDE 75 ML/HR: 600; 310; 30; 20 INJECTION, SOLUTION INTRAVENOUS at 00:23

## 2024-12-24 RX ADMIN — HYDROCODONE BITARTRATE AND ACETAMINOPHEN 1 TABLET: 5; 325 TABLET ORAL at 05:52

## 2024-12-24 RX ADMIN — FLUTICASONE PROPIONATE 2 SPRAY: 50 SPRAY, METERED NASAL at 08:06

## 2024-12-24 NOTE — SIGNIFICANT NOTE
"   12/24/24 1131   Midline Catheter - Single Lumen 12/24/24 Right Basilic   Placement date: If unknown, DO NOT use \"Add Comment\" note/Placement time: If unknown, DO NOT use \"Add Comment\" note: 12/24/24 1130   Hand Hygiene Completed: Yes  Site Prep: Chlorhexidine isopropyl alcohol  All 5 Sterile Barriers Used (Gloves, Gown, Ca...   Site Assessment Clean;Dry;Intact   Line Status Blood return noted;Capped;Flushed;Saline locked   Length jalyn (cm) 11 cm   Extremity Circumference (cm) 26 cm   Dressing Type Border Dressing;Securing device;Antimicrobial dressing/disc   Dressing Status Clean;Dry;Intact   Dressing Intervention New dressing   Dressing Change Due 12/31/24   Indication/Daily Review of Necessity long-term IV access >7 days     Sharp Count Correct 3 needles, 2 guidewires, and 1 scalpel  "

## 2024-12-24 NOTE — NURSING NOTE
Spoke to Dr. Camargo, Orthopedic Surgery and he stated  he will be by to see the patient this morning.

## 2024-12-24 NOTE — SIGNIFICANT NOTE
12/24/24 1403   OTHER   Discipline physical therapy assistant   Rehab Time/Intention   Session Not Performed other (see comments)  (Pt is still awaiting an ortho consult at this time, PT to f/u  pending ortho consult/recommendations.)   Recommendation   PT - Next Appointment 12/26/24

## 2024-12-24 NOTE — PROGRESS NOTES
ID progress note    Chief complaint: ESBL Klebsiella pneumoniae bacteremia     Subjective: No acute events.  No fever.  She says that her dysuria is improved.  She is hoping for discharge to [place] rehab facility tomorrow.  Orthopedics to evaluate for her knee pain.      Medications:    Current Facility-Administered Medications:     acetaminophen (TYLENOL) tablet 650 mg, 650 mg, Oral, Q4H PRN **OR** acetaminophen (TYLENOL) 160 MG/5ML oral solution 650 mg, 650 mg, Oral, Q4H PRN **OR** acetaminophen (TYLENOL) suppository 650 mg, 650 mg, Rectal, Q4H PRN, Oma Villafuerte MD    albuterol (PROVENTIL) nebulizer solution 0.083% 2.5 mg/3mL, 2.5 mg, Nebulization, Q6H PRN, Oma Villafuerte MD    [Held by provider] amLODIPine (NORVASC) tablet 5 mg, 5 mg, Oral, QAM, Oma Villafuerte MD, 5 mg at 12/22/24 0657    sennosides-docusate (PERICOLACE) 8.6-50 MG per tablet 2 tablet, 2 tablet, Oral, BID PRN **AND** polyethylene glycol (MIRALAX) packet 17 g, 17 g, Oral, Daily PRN **AND** bisacodyl (DULCOLAX) EC tablet 5 mg, 5 mg, Oral, Daily PRN **AND** bisacodyl (DULCOLAX) suppository 10 mg, 10 mg, Rectal, Daily PRN, Oma Villafuerte MD    calcium carbonate (oyster shell) tablet 500 mg, 500 mg, Oral, Daily, Oma Villafuerte MD, 500 mg at 12/24/24 0805    carBAMazepine (TEGretol) chewable tablet 100 mg, 100 mg, Oral, Daily PRN, Oma Villafuerte MD    ertapenem (INVanz) 1,000 mg in sodium chloride 0.9 % 100 mL MBP, 1,000 mg, Intravenous, Q24H, Parminder Taylor MD, Last Rate: 200 mL/hr at 12/23/24 1720, 1,000 mg at 12/23/24 1720    fluticasone (FLONASE) 50 MCG/ACT nasal spray 2 spray, 2 spray, Nasal, Daily, Oma Villafuerte MD, 2 spray at 12/24/24 0806    HYDROcodone-acetaminophen (NORCO) 5-325 MG per tablet 1 tablet, 1 tablet, Oral, Q6H PRN, Oma Villafuerte MD, 1 tablet at 12/24/24 0552    lactated ringers infusion, 75 mL/hr, Intravenous, Continuous, Yoan Cerda MD, Last  Rate: 75 mL/hr at 12/24/24 0023, 75 mL/hr at 12/24/24 0023    Lidocaine 4 % 1 patch, 1 patch, Transdermal, Q24H, Oma Villafuerte MD, 1 patch at 12/24/24 0805    [Held by provider] lisinopril (PRINIVIL,ZESTRIL) tablet 20 mg, 20 mg, Oral, QAM, Oma Villafuerte MD    melatonin tablet 2.5 mg, 2.5 mg, Oral, Nightly PRN, Oma Villafuerte MD    multivitamin with minerals 1 tablet, 1 tablet, Oral, QAM, Oma Villafuerte MD, 1 tablet at 12/24/24 0752    ondansetron ODT (ZOFRAN-ODT) disintegrating tablet 4 mg, 4 mg, Oral, Q6H PRN **OR** ondansetron (ZOFRAN) injection 4 mg, 4 mg, Intravenous, Q6H PRN, Oma Villafuerte MD    pantoprazole (PROTONIX) EC tablet 40 mg, 40 mg, Oral, Q AM, Oma Villafuerte MD, 40 mg at 12/24/24 0752    phenazopyridine (PYRIDIUM) tablet 100 mg, 100 mg, Oral, TID With Meals, Yoan Cerda MD, 100 mg at 12/24/24 0805    polyethylene glycol (MIRALAX) packet 17 g, 17 g, Oral, Daily, Yoan Cerda MD, 17 g at 12/24/24 0805    propranolol (INDERAL) tablet 40 mg, 40 mg, Oral, BID, Oma Villafuerte MD, 40 mg at 12/24/24 0805    sennosides-docusate (PERICOLACE) 8.6-50 MG per tablet 1 tablet, 1 tablet, Oral, BID, Yoan Cerda MD, 1 tablet at 12/24/24 0805      Objective   Vital Signs   Temp:  [97.9 °F (36.6 °C)] 97.9 °F (36.6 °C)  Heart Rate:  [60-74] 60  Resp:  [16-18] 18  BP: (114-146)/(51-60) 130/55    Physical Exam:   General: awake, alert, NAD, very nice, sitting up in bed  Eyes: no scleral icterus  Cardiovascular: NR  Respiratory: normal work of breathing without wheezing  GI: Abdomen is soft, not tender  : External urinary catheter  Neurological: Alert and oriented x 3  Psychiatric: Normal mood and affect     Labs:   CBC, BMP, and blood cultures reviewed today  Lab Results   Component Value Date    WBC 10.78 12/24/2024    HGB 9.2 (L) 12/24/2024    HCT 28.3 (L) 12/24/2024    MCV 89.3 12/24/2024     12/24/2024       Lab Results   Component Value Date     GLUCOSE 98 12/24/2024    BUN 19 12/24/2024    CREATININE 0.77 12/24/2024    EGFRIFNONA 60 10/20/2021    EGFRIFAFRI 69 10/20/2021    BCR 24.7 12/24/2024    CO2 23.8 12/24/2024    CALCIUM 7.7 (L) 12/24/2024    PROTENTOTREF 6.6 09/16/2024    ALBUMIN 3.5 12/21/2024    LABIL2 1.9 09/16/2024    AST 25 12/21/2024    ALT 7 12/21/2024     UA TNTC WBCs, 11-20 RBCs, 13-20 squamous cells, large LE, negative nitrite      Microbiology:  12/21 UCx: 50k Pseudomonas aeruginosa susceptible to all antibiotics tested  12/22 BCx: ESBL Kleb pneumo  12/24 BCx: Pending      Prior radiology:  12/22 CT pelvis with nondisplaced fracture of S4    12/21 CT abdomen pelvis with double-J ureteral stent on the left side.  Previously noted left ureteral stone is no longer seen.  Hydronephrosis has almost completely resolved.  There is periureteral and perinephric stranding on the left this is my summary of the radiology report.    ASSESSMENT/PLAN:  ESBL Kleb pneumo septicemia due to  source  S/P ureteral stent exchange  Hyponatremia  Nondisplaced fracture S4    She is improving.  She is afebrile.  Her WBC is normal. I recommend that she continue ertapenem 1 g IV every 24 hours through 12/31/2024 to complete a 10-day total course.  I will order a midline catheter.  Noted plans for discharge to rehab tomorrow.    D/W Dr. Cerda and .    Thank you for allowing me to be involved in the care of this patient. Infectious diseases will sign off at this time with antibiotics plan in place, but please call me at [phone] if any further ID questions or new ID concerns.

## 2024-12-24 NOTE — PLAN OF CARE
Problem: Adult Inpatient Plan of Care  Goal: Plan of Care Review  Outcome: Progressing  Flowsheets (Taken 12/24/2024 0534)  Progress: improving  Plan of Care Reviewed With: patient  Goal: Patient-Specific Goal (Individualized)  Outcome: Progressing  Goal: Absence of Hospital-Acquired Illness or Injury  Outcome: Progressing  Intervention: Identify and Manage Fall Risk  Recent Flowsheet Documentation  Taken 12/24/2024 0414 by Meg Kiran RN  Safety Promotion/Fall Prevention: safety round/check completed  Taken 12/24/2024 0255 by Meg Kiran RN  Safety Promotion/Fall Prevention: safety round/check completed  Taken 12/24/2024 0023 by Meg Kiran RN  Safety Promotion/Fall Prevention: safety round/check completed  Taken 12/23/2024 2212 by Meg Kiran RN  Safety Promotion/Fall Prevention: safety round/check completed  Taken 12/23/2024 2058 by eMg Kiran RN  Safety Promotion/Fall Prevention: safety round/check completed  Intervention: Prevent Skin Injury  Recent Flowsheet Documentation  Taken 12/24/2024 0255 by Meg Kiran RN  Body Position: position changed independently  Taken 12/24/2024 0023 by Meg Kiran RN  Body Position: position changed independently  Taken 12/23/2024 2058 by Meg Kiran RN  Body Position: position changed independently  Intervention: Prevent Infection  Recent Flowsheet Documentation  Taken 12/24/2024 0023 by Meg Kiran RN  Infection Prevention: single patient room provided  Taken 12/23/2024 2058 by Meg Kiran RN  Infection Prevention: single patient room provided  Goal: Optimal Comfort and Wellbeing  Outcome: Progressing  Intervention: Monitor Pain and Promote Comfort  Recent Flowsheet Documentation  Taken 12/23/2024 2058 by Meg Kiran RN  Pain Management Interventions: pain medication given  Intervention: Provide Person-Centered Care  Recent Flowsheet Documentation  Taken 12/24/2024 0023 by  Meg Kiran RN  Trust Relationship/Rapport: care explained  Taken 12/23/2024 2058 by Meg Kiran RN  Trust Relationship/Rapport:   care explained   choices provided   questions answered   questions encouraged  Goal: Readiness for Transition of Care  Outcome: Progressing     Problem: Comorbidity Management  Goal: Blood Pressure in Desired Range  Outcome: Progressing  Intervention: Maintain Blood Pressure Management  Recent Flowsheet Documentation  Taken 12/23/2024 2058 by Meg Kiran RN  Medication Review/Management: medications reviewed     Problem: Infection  Goal: Absence of Infection Signs and Symptoms  Outcome: Progressing  Intervention: Prevent or Manage Infection  Recent Flowsheet Documentation  Taken 12/24/2024 0023 by Meg Kiran RN  Isolation Precautions:   precautions maintained   contact  Taken 12/23/2024 2058 by Meg Kiran RN  Isolation Precautions:   precautions maintained   contact     Problem: Acute Kidney Injury/Impairment  Goal: Fluid and Electrolyte Balance  Outcome: Progressing  Goal: Effective Renal Function  Outcome: Progressing  Intervention: Monitor and Support Renal Function  Recent Flowsheet Documentation  Taken 12/23/2024 2058 by Meg Kiran RN  Medication Review/Management: medications reviewed     Problem: Skin Injury Risk Increased  Goal: Skin Health and Integrity  Outcome: Progressing  Intervention: Optimize Skin Protection  Recent Flowsheet Documentation  Taken 12/24/2024 0414 by Meg Kiran RN  Head of Bed (HOB) Positioning: HOB elevated  Taken 12/24/2024 0255 by Meg Kiran RN  Head of Bed (HOB) Positioning: HOB elevated  Taken 12/24/2024 0023 by Meg Kiran, RN  Activity Management: activity encouraged  Pressure Reduction Techniques: frequent weight shift encouraged  Head of Bed (HOB) Positioning: HOB elevated  Taken 12/23/2024 2212 by Meg Kiran RN  Head of Bed (HOB) Positioning: HOB  elevated  Taken 12/23/2024 2058 by Meg Kiran, RN  Activity Management: activity encouraged  Pressure Reduction Techniques: frequent weight shift encouraged  Head of Bed (HOB) Positioning: HOB elevated     Problem: Fall Injury Risk  Goal: Absence of Fall and Fall-Related Injury  Outcome: Progressing  Intervention: Identify and Manage Contributors  Recent Flowsheet Documentation  Taken 12/24/2024 0023 by Meg Kiran, RN  Self-Care Promotion:   independence encouraged   BADL personal objects within reach  Taken 12/23/2024 2058 by Meg Kiran RN  Medication Review/Management: medications reviewed  Self-Care Promotion:   independence encouraged   BADL personal objects within reach  Intervention: Promote Injury-Free Environment  Recent Flowsheet Documentation  Taken 12/24/2024 0414 by Meg Kiran RN  Safety Promotion/Fall Prevention: safety round/check completed  Taken 12/24/2024 0255 by Meg Kiran RN  Safety Promotion/Fall Prevention: safety round/check completed  Taken 12/24/2024 0023 by Meg Kiran RN  Safety Promotion/Fall Prevention: safety round/check completed  Taken 12/23/2024 2212 by Meg Kiran RN  Safety Promotion/Fall Prevention: safety round/check completed  Taken 12/23/2024 2058 by Meg Kiran RN  Safety Promotion/Fall Prevention: safety round/check completed   Goal Outcome Evaluation:  Plan of Care Reviewed With: patient        Progress: improving  Outcome Evaluation: Patient A&Ox4.

## 2024-12-24 NOTE — SIGNIFICANT NOTE
12/24/24 0703   OTHER   Discipline occupational therapist   Rehab Time/Intention   Session Not Performed other (see comments)  (Pt is still awaiting an ortho consult at this time, OT to f/u as able over the holiday pending ortho recommendations.)

## 2024-12-24 NOTE — PLAN OF CARE
Problem: Adult Inpatient Plan of Care  Goal: Plan of Care Review  Outcome: Progressing  Flowsheets (Taken 12/24/2024 1623)  Plan of Care Reviewed With: patient  Goal: Patient-Specific Goal (Individualized)  Outcome: Progressing  Goal: Absence of Hospital-Acquired Illness or Injury  Outcome: Progressing  Intervention: Identify and Manage Fall Risk  Recent Flowsheet Documentation  Taken 12/24/2024 1418 by Macie Jay RN  Safety Promotion/Fall Prevention: safety round/check completed  Taken 12/24/2024 0800 by Macie Jay RN  Safety Promotion/Fall Prevention: safety round/check completed  Goal: Optimal Comfort and Wellbeing  Outcome: Progressing  Intervention: Monitor Pain and Promote Comfort  Recent Flowsheet Documentation  Taken 12/24/2024 0800 by Macie Jay RN  Pain Management Interventions: pain medication given  Intervention: Provide Person-Centered Care  Recent Flowsheet Documentation  Taken 12/24/2024 1418 by Macie Jay RN  Trust Relationship/Rapport:   care explained   choices provided   emotional support provided   empathic listening provided   questions answered   questions encouraged   reassurance provided   thoughts/feelings acknowledged  Taken 12/24/2024 0800 by Macie Jay RN  Trust Relationship/Rapport:   thoughts/feelings acknowledged   reassurance provided   questions encouraged   empathic listening provided   questions answered   emotional support provided   choices provided   care explained  Goal: Readiness for Transition of Care  Outcome: Progressing     Problem: Comorbidity Management  Goal: Blood Pressure in Desired Range  Outcome: Progressing  Intervention: Maintain Blood Pressure Management  Recent Flowsheet Documentation  Taken 12/24/2024 0800 by Macie Jay RN  Medication Review/Management: medications reviewed     Problem: Infection  Goal: Absence of Infection Signs and Symptoms  Outcome: Progressing     Problem: Acute Kidney Injury/Impairment  Goal: Fluid and  Electrolyte Balance  Outcome: Progressing  Goal: Effective Renal Function  Outcome: Progressing  Intervention: Monitor and Support Renal Function  Recent Flowsheet Documentation  Taken 12/24/2024 0800 by Macie Jay RN  Medication Review/Management: medications reviewed     Problem: Skin Injury Risk Increased  Goal: Skin Health and Integrity  Outcome: Progressing  Intervention: Optimize Skin Protection  Recent Flowsheet Documentation  Taken 12/24/2024 0800 by Macie Jay RN  Activity Management: activity encouraged     Problem: Fall Injury Risk  Goal: Absence of Fall and Fall-Related Injury  Outcome: Progressing  Intervention: Identify and Manage Contributors  Recent Flowsheet Documentation  Taken 12/24/2024 0800 by Macie Jay RN  Medication Review/Management: medications reviewed  Intervention: Promote Injury-Free Environment  Recent Flowsheet Documentation  Taken 12/24/2024 1418 by Macie Jay RN  Safety Promotion/Fall Prevention: safety round/check completed  Taken 12/24/2024 0800 by Macie Jay RN  Safety Promotion/Fall Prevention: safety round/check completed   Goal Outcome Evaluation:  Plan of Care Reviewed With: patient

## 2024-12-24 NOTE — CONSULTS
Orthopaedic & Hand Surgery  Knee pain consult Note  Dr. Triston Gonzales  [phone]    CC: Left knee pain    HPI:  Patient is a 89 y.o. female with history of prior femur fracture first in 2011 status post multiple surgeries as well as a history of kidney stones status post stent placement as wall on 12/19/2024 and who presents with left knee pain    History obtained 12/24/2024:    Pain is characterized as follows:    Onset: 12/21/2024  Location: Left knee  Quality: Sharp, with swelling.  Severity: Moderate to severe  Modifying factors: Improved with pain medication. Improved with nonweightbearing  Context: Underwent procedure for kidney stones on the 19th. She was discharged home. She notes that she felt somewhat unsteady on Saturday and had some fear that she was going to fall. As a result, when she required more pain control, she elected to use Tylenol rather than hydrocodone. She walked into the kitchen to get this and when she was turning to rotate, she caught her foot and fell, striking her left knee. She has a remote history of femur fracture and has undergone several surgeries for this, most recently by Dr. Friedman in 2019 for ORIF of a left distal periprosthetic fracture. She notes that she had some residual grinding after development of arthritis but she had been doing well and ambulating without difficulty until after her most recent procedure.    After her fall, she notes she was unable to get up. Fortunately her friend was bringing her fish sandwich and when she arrived she helped her up and then they called the fire department who transported her to the hospital. She was diagnosed with septicemia and has been admitted and under the treatment of the hospitalist team and infectious disease for this. Given knee pain, CT scan of the left femur and knee was obtained. This was notable for a nondisplaced transverse patella fracture. Orthopedics was called for further management.    MEDICAL HISTORY  Past  Medical History:   Diagnosis Date    Acute bronchitis     Allergic rhinitis     Analgesic overuse headache     Arthritis     Benign essential hypertension     Benign paroxysmal positional vertigo     Cancer     Chronic renal insufficiency     Cluster headache     Constipation     Cough     Dehydration     Dizziness     Dysuria     Fall at home     Fatigue     Hypertension     Hypertension, uncontrolled     Hypertensive urgency     Increased frequency of urination     Kidney stones     Osteoporosis     Otitis media     Postmenopausal HRT (hormone replacement therapy)     Short-term memory loss     Shoulder pain, left     Sleep apnea     no machine    TACS (trigeminal autonomic cephalgias)     Urinary frequency     UTI symptoms     Vertigo     Vitamin B12 deficiency      Past Surgical History:   Procedure Laterality Date    CYSTOSCOPY W/ URETERAL STENT PLACEMENT N/A 10/20/2024    Procedure: CYSTOSCOPY URETERAL CATHETER/STENT INSERTION;  Surgeon: Maverick Park MD;  Location: [facility];  Service: Urology;  Laterality: N/A;    EXTRACORPOREAL SHOCK WAVE LITHOTRIPSY (ESWL) Left 11/4/2024    Procedure: LEFT SHOCKWAVE LITHOTRIPSY;  Surgeon: Georgi Patel MD;  Location: [facility];  Service: Urology;  Laterality: Left;    EXTRACORPOREAL SHOCK WAVE LITHOTRIPSY (ESWL) Left 11/20/2024    Procedure: LEFT URETERAL  SHOCKWAVE LITHOTRIPSY;  Surgeon: Maverick Park MD;  Location: [facility];  Service: Urology;  Laterality: Left;    FEMUR FRACTURE SURGERY Left     FEMUR IM NAILING/RODDING Left 3/16/2019    Procedure: Fixation of periprosthetic femur fracture;  Surgeon: Lauro Friedman MD;  Location: [facility] OR;  Service: Orthopedics    LUMBAR EPIDURAL INJECTION N/A 10/27/2021    Procedure: LUMBAR EPIDURAL 1ST VISIT Lumbar 5-sacral 1;  Surgeon: Brittani Fleming MD;  Location: [facility] MAIN OR;  Service: Pain Management;  Laterality: N/A;    LUMBAR EPIDURAL INJECTION N/A 12/13/2021    Procedure: lumbar  epidural steroid injection;  Surgeon: Brittani Fleming MD;  Location: [facility] [place] MAIN OR;  Service: Pain Management;  Laterality: N/A;    MASTECTOMY      URETEROSCOPY LASER LITHOTRIPSY WITH STENT INSERTION Left 12/19/2024    Procedure: LEFT URETEROSCOPY LASER LITHOTRIPSY WITH STENT REMOVAL AND REPLACEMENT;  Surgeon: Maverick Park MD;  Location: [facility] MAIN OR;  Service: Urology;  Laterality: Left;     Prior to Admission medications    Medication Sig Start Date End Date Taking? Authorizing Provider   acetaminophen (TYLENOL) 500 MG tablet Take 1 tablet by mouth Every 6 (Six) Hours As Needed for Mild Pain.   Yes Flora Mack MD   amLODIPine (NORVASC) 5 MG tablet Take 1.5 tablets by mouth Daily.  Patient taking differently: Take 1 tablet by mouth Every Morning. 9/16/24  Yes Elizabeth Soto MD   Calcium 600-200 MG-UNIT per tablet Take 1 tablet by mouth 2 (Two) Times a Day.   Yes Flora Mack MD   cephalexin (KEFLEX) 500 MG capsule Take 1 capsule by mouth 2 (Two) Times a Day for 14 days. 12/19/24 1/2/25 Yes Maverick Park MD   ertapenem 1,000 mg in sodium chloride 0.9 % 100 mL IVPB Infuse 1,000 mg into a venous catheter Daily for 8 doses. Indications: Bacteria in the Blood 12/24/24 1/1/25 Yes Yoan Cerda MD   fluticasone (FLONASE) 50 MCG/ACT nasal spray Administer 2 sprays into the nostril(s) as directed by provider Daily.   Yes Flora Mack MD   HYDROcodone-acetaminophen (NORCO) 5-325 MG per tablet Take 1 tablet by mouth Every 8 (Eight) Hours As Needed for Moderate Pain for up to 3 days. 12/24/24 12/27/24 Yes Yoan Cerda MD   lisinopril (PRINIVIL,ZESTRIL) 20 MG tablet Take 1 tablet by mouth Daily. for blood pressure  Patient taking differently: Take 1 tablet by mouth Every Morning. for blood pressure 9/16/24  Yes Elizabeth Soto MD   Multiple Vitamins-Minerals (MULTIVITAMIN WITH MINERALS) tablet tablet Take 1 tablet by mouth Every Morning.   Yes Flora Mack MD    omeprazole (priLOSEC) 20 MG capsule Take 1 capsule by mouth Every Morning.   Yes Flora Mack MD   phenazopyridine (PYRIDIUM) 100 MG tablet Take 1 tablet by mouth 3 (Three) Times a Day With Meals for 3 doses. 12/24/24 12/25/24 Yes Yoan Cerda MD   polyethylene glycol (MIRALAX) 17 g packet Take 17 g by mouth Daily. 12/25/24  Yes Yoan Cerda MD   propranolol (INDERAL) 40 MG tablet Take 1 tablet by mouth 2 (Two) Times a Day. 9/16/24  Yes Elizabeth Soto MD   sennosides-docusate (PERICOLACE) 8.6-50 MG per tablet Take 1 tablet by mouth 2 (Two) Times a Day. 12/24/24  Yes Yoan Cerda MD   HYDROcodone-acetaminophen (NORCO) 5-325 MG per tablet Take 1 tablet by mouth Every 6 (Six) Hours As Needed for Moderate Pain. 11/20/24 12/24/24 Yes Maverick Park MD   albuterol sulfate  (90 Base) MCG/ACT inhaler Inhale 2 puffs Every 4 (Four) Hours As Needed for Shortness of Air or Wheezing (cough).  Patient not taking: Reported on 12/21/2024 11/7/24   Sha Neal III, PA   carBAMazepine (TEGretol) 100 MG chewable tablet Chew 1 tablet Daily As Needed (cluster headache). 7/3/23   Elizabeth Soto MD   oxyCODONE (OXY-IR) 5 MG capsule Take 1 capsule by mouth Every 4 (Four) Hours As Needed for Moderate Pain.  Patient not taking: Reported on 12/21/2024    Flora Mack MD   phenazopyridine (PYRIDIUM) 200 MG tablet Take 1 tablet by mouth 3 (Three) Times a Day As Needed for Bladder Spasms.  Patient not taking: Reported on 12/21/2024    Flora Mack MD     Allergies   Allergen Reactions    Cvs Diuretic Maximum Strength [Pamabrom] Other (See Comments)     Unknown diuretic caused hives      Most Recent Immunizations   Administered Date(s) Administered    COVID-19 (PFIZER) 12YRS+ (COMIRNATY) 03/18/2024    COVID-19 (PFIZER) Purple Cap Monovalent 11/04/2021    Covid-19 (Pfizer) Gray Cap Monovalent 09/01/2022    Fluad Quad 65+ 09/30/2021    Fluzone  >6mos 09/03/2020    Fluzone  "High-Dose 65+YRS 10/23/2018    Fluzone High-Dose 65+yrs 09/22/2023    Influenza Seasonal Injectable 09/01/2022    Influenza, Unspecified 09/01/2022    Pneumococcal Conjugate 13-Valent (PCV13) 11/23/2015    Pneumococcal Polysaccharide (PPSV23) 12/28/2016    Shingrix 07/06/2021     Social History     Tobacco Use    Smoking status: Never     Passive exposure: Never    Smokeless tobacco: Never    Tobacco comments:     4 cups of coffee in the AM   Substance Use Topics    Alcohol use: No      Social History     Substance and Sexual Activity   Drug Use No       REVIEW OF SYSTEMS:  General: negative for fevers or chills  Head: negative for headache  Respiratory: negative for shortness of breath above baseline. Currently on oxygen.   Cardiovascular: negative for chest pain.   Gastrointestinal: negative for nausea or vomiting.   Neurological: negative for numbness or paresthesias  Psychiatric/Behavioral: negative for memory loss.   All other systems reviewed and are negative    VITALS: /56   Pulse 62   Temp 97.9 °F (36.6 °C)   Resp 18   Ht 170.2 cm (67\")   Wt 73.4 kg (161 lb 13.1 oz)   SpO2 95%   BMI 25.34 kg/m²  Body mass index is 25.34 kg/m².    PHYSICAL EXAM:   CONSTITUTIONAL: No acute distress  EXTREMITY: Left Lower Extremity  INSPECTION: Skin warm and well-perfused. Slight swelling at the knee.  Palpation: Tender to palpation at the distal pole of patella. Not tender significantly over the medial or lateral joint line. Not tender more proximally in the femur.  Vascular: 2+ DP pulse and brisk cap refill to the foot  Sensation: Intact to sensation of the foot.  Motor: Able to flex and extend at the knee, ankle and great toe.  Range of Motion / Stability: Able to perform a straight leg raise. Knee range of motion 0 to over 90°.    RADIOLOGY REVIEW:   CT Pelvis Without Contrast    Result Date: 12/22/2024   1. Nondisplaced fracture in the S4 segment of the sacrum with small amount of presacral hemorrhage, appears " stable in the interval. 2. Open reduction internal fixation of the left femur status post prior intertrochanteric and femoral diaphysis fractures. 3. Small linear lucency seen across the inferior lateral margin of the left patella, an age-indeterminate fracture. Correlation with tenderness. 4. Small left knee effusion  This report was finalized on 12/22/2024 10:15 AM by Dr. Stanley Cook M.D on Workstation: [id number]      CT Abdomen Pelvis Without Contrast    Result Date: 12/21/2024   1. Interval placement of a double-J ureteral stent on the left. Previously noted left ureteral stone is no longer seen. Hydronephrosis has almost completely resolved. There is periureteral and perinephric stranding on the left. Correlation with urinalysis and urine cultures is recommended. 2. Atelectasis versus infiltrate noted at the left lung base. 3. There is some subtle buckling of the anterior cortex of S4 on the sagittal series. It is more apparent than on prior exam. Nondisplaced fracture is not excluded. No pelvic hematoma is seen.  Radiation dose reduction techniques were utilized, including automated exposure control and exposure modulation based on body size.   This report was finalized on 12/21/2024 7:43 PM by Dr. Juana Guillermo M.D on Workstation: [id number]      XR Knee 1 or 2 View Left    Result Date: 12/21/2024  Low bone mineralization limits evaluation. Within limitation, no evidence of acute fracture. Normal alignment. No knee joint effusion. Mild medial and lateral compartment narrowing.  Plate and screw fixation extending from the proximal metaphysis to the distal metaphysis transfixing a healed prior femoral diaphyseal fracture. Multiple cerclage wires are also present. Hardware is intact. Additional intact proximal right intramedullary nicole with dynamic interlocking screw transfixing a prior partially visualized intertrochanteric femur fracture..  This report was finalized on 12/21/2024 5:45 PM by   Gus Corrales M.D on Workstation: [id number]     I have independently reviewed the images and agree with radiologist assessment as noted above.    LABS:   Results for the past 24 hours:   Recent Results (from the past 24 hours)   CBC (No Diff)    Collection Time: 12/24/24  6:39 AM    Specimen: Blood   Result Value Ref Range    WBC 10.78 3.40 - 10.80 10*3/mm3    RBC 3.17 (L) 3.77 - 5.28 10*6/mm3    Hemoglobin 9.2 (L) 12.0 - 15.9 g/dL    Hematocrit 28.3 (L) 34.0 - 46.6 %    MCV 89.3 79.0 - 97.0 fL    MCH 29.0 26.6 - 33.0 pg    MCHC 32.5 31.5 - 35.7 g/dL    RDW 12.4 12.3 - 15.4 %    RDW-SD 40.2 37.0 - 54.0 fl    MPV 10.0 6.0 - 12.0 fL    Platelets 163 140 - 450 10*3/mm3   Basic Metabolic Panel    Collection Time: 12/24/24  6:39 AM    Specimen: Blood   Result Value Ref Range    Glucose 98 65 - 99 mg/dL    BUN 19 8 - 23 mg/dL    Creatinine 0.77 0.57 - 1.00 mg/dL    Sodium 127 (L) 136 - 145 mmol/L    Potassium 4.2 3.5 - 5.2 mmol/L    Chloride 98 98 - 107 mmol/L    CO2 23.8 22.0 - 29.0 mmol/L    Calcium 7.7 (L) 8.2 - 9.6 mg/dL    BUN/Creatinine Ratio 24.7 7.0 - 25.0    Anion Gap 5.2 5.0 - 15.0 mmol/L    eGFR 72.9 >60.0 mL/min/1.73       IMPRESSION:  Patient is a 89 y.o. female with left knee transverse nondisplaced patellar fracture sustained 12/21/2024 after a fall onto the knee. This is a history of multiple surgeries for femur fractures, most recently by Dr. Friedman on 3/16/2019. Able to perform a straight leg raise.    I discussed the nature of the condition with the patient including relevant anatomy, natural history and conservative and surgical treatment options. Conservative treatment would entail use of a knee immobilizer with weightbearing as tolerated. Surgical management should not be necessary as long as she does not have displacement of the fracture and maintains an intact extensor mechanism. I will plan to see her in the office in approximately 2 weeks for repeat imaging and assessment of function. She does  not otherwise need to remain in the hospital if she is appropriate for discharge from the perspective of the other treatment team's. Following a thorough conversation, questions were solicited and answered to her satisfaction. She voiced understanding of the conversation and stated desire to proceed with conservative care as outlined above.    PLAN:   Admited to: Oma Villafuerte MD  Diet: Per Primary Team  Weight Bearing:  Left Lower Extremity: Weight Bearing As Tolerated in a knee immobilizer. Recommend use of a walker for stability  Labs: None additional needed  Imaging: None additional needed  Surgery: No surgery indicated for this injury  Physical therapy to continue working on mobilization with balance  Disposition: Acute, per primary. Cleared for discharge from orthopedic perspective with follow-up in approximately 2 weeks. Thank you for allowing us to take part in the care of your patient.    Triston Gonzales MD, PhD  Orthopaedic & Hand Surgery  [facility]  [phone] - [place] Office  [phone] - [facility]

## 2024-12-24 NOTE — CASE MANAGEMENT/SOCIAL WORK
"Physicians Statement of Medical Necessity for  Ambulance Transportation    GENERAL INFORMATION     Name: Keyana Liz  YOB: 1933  Medicare #: [id number]  Transport Date: 12/25/24 (Valid for round trips this date, or for scheduled repetitive trips for 60 days from the date signed below.)  Origin: [facility]  Destination: [address] [place] [address]  Is the Patient's stay covered under Medicare Part A (PPS/DRG?)Yes  Closest appropriate facility? Yes  If this a hosp-hosp transfer? No  Is this a hospice patient? No    MEDICAL NECESSITY QUESTIONAIRE    Ambulance Transportation is medically necessary only if other means of transportation are contraindicated or would be potentially harmful to the patient.  To meet this requirement, the patient must be either \"bed confined\" or suffer from a condition such that transport by means other than an ambulance is contraindicated by the patient's condition.  The following questions must be answered by the healthcare professional signing below for this form to be valid:     1) Describe the MEDICAL CONDITION (physical and/or mental) of this patient AT THE TIME OF AMBULANCE TRANSPORT that requires the patient to be transported in an ambulance, and why transport by other means is contraindicated by the patient's condition: Limited Mobility  Past Medical History:   Diagnosis Date    Acute bronchitis     Allergic rhinitis     Analgesic overuse headache     Arthritis     Benign essential hypertension     Benign paroxysmal positional vertigo     Cancer     Chronic renal insufficiency     Cluster headache     Constipation     Cough     Dehydration     Dizziness     Dysuria     Fall at home     Fatigue     Hypertension     Hypertension, uncontrolled     Hypertensive urgency     Increased frequency of urination     Kidney stones     Osteoporosis     Otitis media     Postmenopausal HRT (hormone replacement therapy)     Short-term memory loss     " "Shoulder pain, left     Sleep apnea     no machine    TACS (trigeminal autonomic cephalgias)     Urinary frequency     UTI symptoms     Vertigo     Vitamin B12 deficiency       Past Surgical History:   Procedure Laterality Date    CYSTOSCOPY W/ URETERAL STENT PLACEMENT N/A 10/20/2024    Procedure: CYSTOSCOPY URETERAL CATHETER/STENT INSERTION;  Surgeon: Maverick Park MD;  Location: [facility] MAIN OR;  Service: Urology;  Laterality: N/A;    EXTRACORPOREAL SHOCK WAVE LITHOTRIPSY (ESWL) Left 11/4/2024    Procedure: LEFT SHOCKWAVE LITHOTRIPSY;  Surgeon: Georgi Patel MD;  Location: [facility] MAIN OR;  Service: Urology;  Laterality: Left;    EXTRACORPOREAL SHOCK WAVE LITHOTRIPSY (ESWL) Left 11/20/2024    Procedure: LEFT URETERAL  SHOCKWAVE LITHOTRIPSY;  Surgeon: Maverick Park MD;  Location: [facility] MAIN OR;  Service: Urology;  Laterality: Left;    FEMUR FRACTURE SURGERY Left     FEMUR IM NAILING/RODDING Left 3/16/2019    Procedure: Fixation of periprosthetic femur fracture;  Surgeon: Lauro Friedman MD;  Location: [facility] MAIN OR;  Service: Orthopedics    LUMBAR EPIDURAL INJECTION N/A 10/27/2021    Procedure: LUMBAR EPIDURAL 1ST VISIT Lumbar 5-sacral 1;  Surgeon: Brittani Fleming MD;  Location: [facility] MAIN [facility];  Service: Pain Management;  Laterality: N/A;    LUMBAR EPIDURAL INJECTION N/A 12/13/2021    Procedure: lumbar epidural steroid injection;  Surgeon: Brittani Fleming MD;  Location: [facility] MAIN [facility];  Service: Pain Management;  Laterality: N/A;    MASTECTOMY      URETEROSCOPY LASER LITHOTRIPSY WITH STENT INSERTION Left 12/19/2024    Procedure: LEFT URETEROSCOPY LASER LITHOTRIPSY WITH STENT REMOVAL AND REPLACEMENT;  Surgeon: Maverick Park MD;  Location: [facility] MAIN OR;  Service: Urology;  Laterality: Left;      2) Is this patient \"bed confined\" as defined below?Yes   To be \"bed confined\" the patient must satisfy all three of the following criteria:  (1) unable to get up from bed without assistance; " AND (2) unable to ambulate;  AND (3) unable to sit in a chair or wheelchair.  3) Can this patient safely be transported by car or wheelchair van (I.e., may safely sit during transport, without an attendant or monitoring?)No   4. In addition to completing questions 1-3 above, please check any of the following conditions that apply*:          *Note: supporting documentation for any boxes checked must be maintained in the patient's medical records Moderate/severe pain on movement and Unable to tolerate seated position for time needed to transport      SIGNATURE OF PHYSICIAN OR OTHER AUTHORIZED HEALTHCARE PROFESSIONAL    I certify that the above information is true and correct based on my evaluation of this patient, and represent that the patient requires transport by ambulance and that other forms of transport are contraindicated.  I understand that this information will be used by the Centers for Medicare and Medicaid Services (CMS) to support the determiniation of medical necessity for ambulance services, and I represent that I have personal knowledge of the patient's condition at the time of transport.       If this box is checked, I also certify that the patient is physically or mentally incapable of signing the ambulance service's claim form and that the institution with which I am affiliated has furnished care, services or assistance to the patient.  My signature below is made on behalf of the patient pursuant to 42 .36(b)(4). In accordance with 42 .37, the specific reason(s) that the patient is physically or mentally incapable of signing the claim for is as follows:     Signature of Physician or Healthcare Professional  Date/Time:        (For Scheduled repetitive transport, this form is not valid for transports performed more than 60 days after this date).                                                                                                                                             --------------------------------------------------------------------------------------------  Printed Name and Credentials of Physician or Authorized Healthcare Professional     *Form must be signed by patient's attending physician for scheduled, repetitive transports,.  For non-repetitive ambulance transports, if unable to obtain the signature of the attending physician, any of the following may sign (please select below):     Physician  Clinical Nurse Specialist  Registered Nurse     Physician Assistant  Discharge Planner  Licensed Practical Nurse     Nurse Practitioner

## 2024-12-24 NOTE — PROGRESS NOTES
Name: Keyana Liz ADMIT: 2024   : 9/15/1933  PCP: Elizabeth Soto MD    MRN: [id number] LOS: 2 days   AGE/SEX: 89 y.o. female  ROOM: [facility]     Subjective   Subjective   Patient resting comfortably in bed.  Still having left knee pain and back pain but is well-controlled on current regimen.  Awaiting Ortho consult to see if she can bear weight on her left knee.  Afebrile.         Objective   Objective   Vital Signs  Temp:  [97.9 °F (36.6 °C)] 97.9 °F (36.6 °C)  Heart Rate:  [60-74] 60  Resp:  [16-18] 18  BP: (119-146)/(51-60) 130/55  SpO2:  [95 %-99 %] 95 %  on   ;   Device (Oxygen Therapy): room air  Body mass index is 25.34 kg/m².  Physical Exam  Constitutional:       General: She is not in acute distress.     Appearance: She is not ill-appearing.      Comments: Elderly   Cardiovascular:      Rate and Rhythm: Normal rate and regular rhythm.   Pulmonary:      Effort: Pulmonary effort is normal. No respiratory distress.   Abdominal:      General: Abdomen is flat. There is no distension.      Tenderness: There is no abdominal tenderness.   Musculoskeletal:         General: No swelling or deformity. Normal range of motion.      Comments: Left knee tenderness to palpation   Skin:     General: Skin is warm and dry.   Neurological:      General: No focal deficit present.      Mental Status: She is alert. Mental status is at baseline.         Results Review     I reviewed the patient's new clinical results.  Results from last 7 days   Lab Units 24  0639 24  0643 24  0653 24  1718   WBC 10*3/mm3 10.78 12.77* 19.21* 27.40*   HEMOGLOBIN g/dL 9.2* 9.9* 9.1* 10.7*   PLATELETS 10*3/mm3 163 161 172 211     Results from last 7 days   Lab Units 24  0639 24  0643 24  0653 24  1718   SODIUM mmol/L 127* 128* 128* 130*   POTASSIUM mmol/L 4.2 4.2 4.0 4.9   CHLORIDE mmol/L 98 95* 91* 94*   CO2 mmol/L 23.8 22.5 24.1 25.0   BUN mg/dL 19 26* 39* 42*   CREATININE  "mg/dL 0.77 1.00 1.39* 1.79*   GLUCOSE mg/dL 98 96 105* 113*   Estimated Creatinine Clearance: 55.1 mL/min (by C-G formula based on SCr of 0.77 mg/dL).  Results from last 7 days   Lab Units 12/21/24  1718   ALBUMIN g/dL 3.5   BILIRUBIN mg/dL 0.4   ALK PHOS U/L 53   AST (SGOT) U/L 25   ALT (SGPT) U/L 7     Results from last 7 days   Lab Units 12/24/24  0639 12/23/24  0643 12/22/24  0653 12/21/24  1718   CALCIUM mg/dL 7.7* 8.0* 8.1* 9.0   ALBUMIN g/dL  --   --   --  3.5     Results from last 7 days   Lab Units 12/22/24  0023   LACTATE mmol/L 0.8     COVID19   Date Value Ref Range Status   11/07/2024 Not Detected Not Detected - Ref. Range Final   08/29/2024 Not Detected Not Detected - Ref. Range Final     No results found for: \"HGBA1C\", \"POCGLU\"    CT Pelvis Without Contrast  Narrative: CT PELVIS WO CONTRAST-     INDICATION: Fall, tailbone pain     COMPARISON: CT abdomen pelvis December 21, 2024     TECHNIQUE:  CT pelvis without IV contrast. Coronal and sagittal reformats. Radiation  dose reduction techniques were utilized, including automated exposure  control and exposure modulation based on body size.     FINDINGS:      Pelvis: Gas in the nondependent bladder lumen. Mild bladder distention.  No bladder calculus. Left ureteral stent uterus is present. No adnexal  mass. Small amount of free fluid seen in the cul-de-sac.     Bowel: Colonic diverticulosis. No obstruction.     Pelvic wall: Rectus diastases.     Retroperitoneum: No lymphadenopathy.     Vasculature: Moderate aortoiliac atherosclerotic calcification.     Osseous structures: Open reduction internal fixation of the left femur  with gamma nail and interlocking femoral neck dynamic compression screw  as well as plate and screw and cable fixation. No lucency around the  hardware. Evidence of old intertrochanteric and femoral diaphysis  fractures. Fracture line seen in the inferolateral left patella. Left  L5/S1 assimilation. Right L5/T1 pseudoarticulation. Lower " lumbar facet  degenerative arthropathy with grade 1 anterolisthesis of L4 on L5.  Anterior cortical buckling of the S4 segment of the sacrum is new from  CT performed October 20, 2024, stable from CT performed December 21, 2024, suspect nondisplaced fracture, with small amount of presacral  hemorrhage. Small left knee effusion.     Impression:    1. Nondisplaced fracture in the S4 segment of the sacrum with small  amount of presacral hemorrhage, appears stable in the interval.  2. Open reduction internal fixation of the left femur status post prior  intertrochanteric and femoral diaphysis fractures.  3. Small linear lucency seen across the inferior lateral margin of the  left patella, an age-indeterminate fracture. Correlation with  tenderness.  4. Small left knee effusion     This report was finalized on 12/22/2024 10:15 AM by Dr. Stanley Cook M.D on Workstation: [id number]       I reviewed the patient's daily medications.  Scheduled Medications  [Held by provider] amLODIPine, 5 mg, Oral, QAM  calcium carbonate (oyster shell), 500 mg, Oral, Daily  ertapenem, 1,000 mg, Intravenous, Q24H  fluticasone, 2 spray, Nasal, Daily  Lidocaine, 1 patch, Transdermal, Q24H  [Held by provider] lisinopril, 20 mg, Oral, QAM  multivitamin with minerals, 1 tablet, Oral, QAM  pantoprazole, 40 mg, Oral, Q AM  phenazopyridine, 100 mg, Oral, TID With Meals  polyethylene glycol, 17 g, Oral, Daily  propranolol, 40 mg, Oral, BID  senna-docusate sodium, 1 tablet, Oral, BID  sodium chloride, 10 mL, Intravenous, Q12H    Infusions  lactated ringers, 75 mL/hr, Last Rate: 75 mL/hr (12/24/24 0023)      Diet  Diet: Cardiac; Healthy Heart (2-3 Na+); Fluid Consistency: Thin (IDDSI 0)         I have personally reviewed:  [x]  Laboratory   [x]  Microbiology   [x]  Radiology   []  EKG/Telemetry   []  Cardiology/Vascular   []  Pathology   [x]  Records     Assessment/Plan     Active Hospital Problems    Diagnosis  POA    **Bacteremia [R78.81]  Yes     JAI (acute kidney injury) [N17.9]  Yes    Acute UTI [N39.0]  Yes    Acute pyelonephritis [N10]  Yes    Hyponatremia [E87.1]  Yes      Resolved Hospital Problems   No resolved problems to display.       89 y.o. female admitted with Bacteremia.    Urinary tract infection, concern for early left pyelonephritis  ESBL bacteremia  Nephrolithiasis with recent double-J ureteral left stent  -Interestingly, urine culture is pansensitive but 2of2 blood cultures are ESBL Klebsiella  -Urology consulted, appreciate recommendations  -ID consulted-plan for 10-day course of IV ertapenem.  Midline placed 12/24  -Continue Pyridium now that her kidney function has returned to normal for 3 days    JAI  Hyponatremia  -Creatinine resolved, sodium stable, continue IV fluids for now  -Sodium stable.  Will stop IV fluids and continue to monitor.    Hyponatremia  -Continue fluids     Hypertension-hold amlodipine, lisinopril for now.  Continue propranolol.  Resume when blood pressure tolerates    Nondisplaced S4 fracture  Possible small left patella fracture on CT  -Ortho consulted  -Continue pain control.  Start bowel regimen      SCDs for DVT prophylaxis.  Limited code (no CPR, no intubation).  Discussed with patient and nursing staff.   Anticipate discharge to SNU facility tomorrow.  Pending Ortho evaluation    Expected Discharge Date: 12/25/2024; Expected Discharge Time: 11:00 AM      Yoan Cerda MD  [facility]  12/24/24  11:47 EST

## 2024-12-24 NOTE — CASE MANAGEMENT/SOCIAL WORK
Continued Stay Note  [facility]     Patient Name: Keyana Liz  MRN: [id number]  Today's Date: 12/24/2024    Admit Date: 12/21/2024    Plan: [facility] via [facility] at 1100 on 12/25   Discharge Plan       Row Name 12/24/24 1005       Plan    Plan [facility] via [facility] at 1100 on 12/25    Plan Comments Spoke with Dolores/ Pastoral care will have patient sign EMS DNR for tomorrow's [facility] scheduled at 1100. Packet placed in patient's cubby. EMS necessity form completed.                   Discharge Codes    No documentation.                 Expected Discharge Date and Time       Expected Discharge Date Expected Discharge Time    Dec 25, 2024               Elayne Muñoz RN

## 2024-12-25 VITALS
TEMPERATURE: 98.2 F | HEART RATE: 75 BPM | HEIGHT: 67 IN | WEIGHT: 161.82 LBS | BODY MASS INDEX: 25.4 KG/M2 | SYSTOLIC BLOOD PRESSURE: 153 MMHG | RESPIRATION RATE: 18 BRPM | DIASTOLIC BLOOD PRESSURE: 69 MMHG | OXYGEN SATURATION: 96 %

## 2024-12-25 LAB
ANION GAP SERPL CALCULATED.3IONS-SCNC: 7 MMOL/L (ref 5–15)
BACTERIA SPEC AEROBE CULT: ABNORMAL
BACTERIA SPEC AEROBE CULT: ABNORMAL
BUN SERPL-MCNC: 16 MG/DL (ref 8–23)
BUN/CREAT SERPL: 21.6 (ref 7–25)
CALCIUM SPEC-SCNC: 7.8 MG/DL (ref 8.2–9.6)
CHLORIDE SERPL-SCNC: 98 MMOL/L (ref 98–107)
CO2 SERPL-SCNC: 25 MMOL/L (ref 22–29)
CREAT SERPL-MCNC: 0.74 MG/DL (ref 0.57–1)
DEPRECATED RDW RBC AUTO: 40.6 FL (ref 37–54)
EGFRCR SERPLBLD CKD-EPI 2021: 76.5 ML/MIN/1.73
ERYTHROCYTE [DISTWIDTH] IN BLOOD BY AUTOMATED COUNT: 12.5 % (ref 12.3–15.4)
GLUCOSE SERPL-MCNC: 96 MG/DL (ref 65–99)
GRAM STN SPEC: ABNORMAL
HCT VFR BLD AUTO: 27.9 % (ref 34–46.6)
HGB BLD-MCNC: 9.2 G/DL (ref 12–15.9)
ISOLATED FROM: ABNORMAL
ISOLATED FROM: ABNORMAL
MCH RBC QN AUTO: 29.4 PG (ref 26.6–33)
MCHC RBC AUTO-ENTMCNC: 33 G/DL (ref 31.5–35.7)
MCV RBC AUTO: 89.1 FL (ref 79–97)
PLATELET # BLD AUTO: 177 10*3/MM3 (ref 140–450)
PMV BLD AUTO: 9.6 FL (ref 6–12)
POTASSIUM SERPL-SCNC: 4 MMOL/L (ref 3.5–5.2)
RBC # BLD AUTO: 3.13 10*6/MM3 (ref 3.77–5.28)
SODIUM SERPL-SCNC: 130 MMOL/L (ref 136–145)
WBC NRBC COR # BLD AUTO: 10.36 10*3/MM3 (ref 3.4–10.8)

## 2024-12-25 PROCEDURE — 80048 BASIC METABOLIC PNL TOTAL CA: CPT | Performed by: STUDENT IN AN ORGANIZED HEALTH CARE EDUCATION/TRAINING PROGRAM

## 2024-12-25 PROCEDURE — 85027 COMPLETE CBC AUTOMATED: CPT | Performed by: STUDENT IN AN ORGANIZED HEALTH CARE EDUCATION/TRAINING PROGRAM

## 2024-12-25 RX ORDER — AMLODIPINE BESYLATE 5 MG/1
5 TABLET ORAL EVERY MORNING
Start: 2024-12-25

## 2024-12-25 RX ORDER — LISINOPRIL 10 MG/1
10 TABLET ORAL EVERY MORNING
Start: 2024-12-25

## 2024-12-25 RX ADMIN — PROPRANOLOL HYDROCHLORIDE 40 MG: 40 TABLET ORAL at 08:32

## 2024-12-25 RX ADMIN — Medication 1 TABLET: at 06:14

## 2024-12-25 RX ADMIN — FLUTICASONE PROPIONATE 2 SPRAY: 50 SPRAY, METERED NASAL at 08:34

## 2024-12-25 RX ADMIN — POLYETHYLENE GLYCOL 3350 17 G: 17 POWDER, FOR SOLUTION ORAL at 08:32

## 2024-12-25 RX ADMIN — PANTOPRAZOLE SODIUM 40 MG: 40 TABLET, DELAYED RELEASE ORAL at 06:14

## 2024-12-25 RX ADMIN — SENNOSIDES AND DOCUSATE SODIUM 1 TABLET: 50; 8.6 TABLET ORAL at 08:32

## 2024-12-25 RX ADMIN — Medication 10 ML: at 08:34

## 2024-12-25 RX ADMIN — CALCIUM 500 MG: 500 TABLET ORAL at 08:32

## 2024-12-25 RX ADMIN — LIDOCAINE 1 PATCH: 42 PATCH PERCUTANEOUS; TOPICAL; TRANSDERMAL at 08:32

## 2024-12-25 NOTE — DISCHARGE PLACEMENT REQUEST
"Steven Liz (89 y.o. Female)       Date of Birth   09/15/1933    Social Security Number       Address   [address] [facility] [address]    Home Phone   [phone]    MRN   [id number]       [facility]    Marital Status                               Admission Date   12/21/24    Admission Type   Emergency    Admitting Provider   Oma Villafuerte MD    Attending Provider   Prashant Lazo MD    Department, Room/Bed   [address], S[address]/1       Discharge Date       Discharge Disposition   Skilled Nursing Facility (DC - External)    Discharge Destination                                 Attending Provider: Prashant Lazo MD    Allergies: Cvs Diuretic Maximum Strength [Pamabrom]    Isolation: Contact   Infection: ESBL (12/22/24), ESBL Klebsiella (12/24/24)   Code Status: No CPR    Ht: 170.2 cm (67\")   Wt: 73.4 kg (161 lb 13.1 oz)    Admission Cmt: None   Principal Problem: Bacteremia [R78.81]                   Active Insurance as of 12/21/2024       Primary Coverage       Payor Plan Insurance Group Employer/Plan Group    MEDICARE MEDICARE A & B        Payor Plan Address Payor Plan Phone Number Payor Plan Fax Number Effective Dates    [address] [phone]  9/1/1998 - None Entered    [facility] [id number]         Subscriber Name Subscriber Birth Date Member ID       STEVEN LIZ 9/15/1933 [id number]               Secondary Coverage       Payor Plan Insurance Group Employer/Plan Group    AETNA COMMERCIAL AETNA HEALTH AND LIFE  SUP               Payor Plan Address Payor Plan Phone Number Payor Plan Fax Number Effective Dates    [address] [address]   1/1/2020 - None Entered    [facility] [address]         Subscriber Name Subscriber Birth Date Member ID       STEVEN LIZ 9/15/1933 [id number]                     Emergency Contacts        (Rel.) Home Phone Work Phone Mobile Phone    KAILA HARRY (Friend) [phone] -- " [phone]    TONIO BARRERA (Son) [phone] -- --    Faviola Hastings (Daughter) -- -- [phone]    FEDE SEVILLA (Friend) -- -- [phone]                   Discharge Summary        Prashant Lazo MD at 12/25/24 0838          Date of Discharge:  12/25/2024    PCP: Elizabeth Soto MD    Discharge Diagnosis:   Active Hospital Problems    Diagnosis  POA    **Bacteremia [R78.81]  Yes    JAI (acute kidney injury) [N17.9]  Yes    Acute UTI [N39.0]  Yes    Acute pyelonephritis [N10]  Yes    Hyponatremia [E87.1]  Yes      Resolved Hospital Problems   No resolved problems to display.          Consults       Date and Time Order Name Status Description    12/23/2024  6:35 AM Inpatient Infectious Diseases Consult Completed     12/22/2024  3:35 PM Inpatient Orthopedic Surgery Consult Completed     12/21/2024  9:04 PM Inpatient Urology Consult Completed           Hospital Course  Patient is a 89 y.o. female with recent cystoscopy and left stent placement for left stone and pyelonephritis presented with urinary tract infection and JAI found to have pansensitive Pseudomonas in the urine but Klebsiella ESBL in 2 of 2 blood cultures. ID recommended 10 days of IV ertapenem (through 12/31/2024) for Klebsiella septicemia due to  source and midline was placed 12/24/2024. She was also put on some Pyridium and currently has some orange-colored urine. Urology plans stent removal in a week. JAI resolved and BP is a little high so her BP medications are being started back (lisinopril lower dose at first). She was also found to have possible small left patella fracture on CT and nondisplaced S4 fracture. Ortho recommended weightbearing as tolerated in knee immobilizer and walker for stability. Ortho would like to see her in a couple of weeks.    I discussed the patient's findings and my recommendations with patient and nursing staff    Temp:  [97.3 °F (36.3 °C)-98.2 °F (36.8 °C)] 98.2 °F (36.8 °C)  Heart Rate:  [62-75]  75  Resp:  [18] 18  BP: (121-153)/(56-84) 153/69  Body mass index is 25.34 kg/m².    Physical Exam  Constitutional:       General: She is not in acute distress.     Appearance: She is not toxic-appearing.   HENT:      Head: Normocephalic and atraumatic.   Cardiovascular:      Rate and Rhythm: Normal rate and regular rhythm.   Pulmonary:      Effort: Pulmonary effort is normal. No respiratory distress.      Breath sounds: Normal breath sounds. No wheezing or rhonchi.   Abdominal:      General: Bowel sounds are normal.      Palpations: Abdomen is soft.      Tenderness: There is no abdominal tenderness. There is no guarding or rebound.   Musculoskeletal:      Comments: Left knee immobilizer   Skin:     General: Skin is warm and dry.   Neurological:      General: No focal deficit present.      Mental Status: She is alert and oriented to person, place, and time.   Psychiatric:         Mood and Affect: Mood normal.         Behavior: Behavior normal.       Disposition: Skilled Nursing Facility (DC - External)       Discharge Medications        New Medications        Instructions Start Date   ertapenem 1,000 mg in sodium chloride 0.9 % 100 mL IVPB   1,000 mg, Intravenous, Every 24 Hours      polyethylene glycol 17 g packet  Commonly known as: MIRALAX   17 g, Oral, Daily      sennosides-docusate 8.6-50 MG per tablet  Commonly known as: PERICOLACE   1 tablet, Oral, 2 Times Daily             Changes to Medications        Instructions Start Date   HYDROcodone-acetaminophen 5-325 MG per tablet  Commonly known as: NORCO  What changed: when to take this   1 tablet, Oral, Every 8 Hours PRN      lisinopril 10 MG tablet  Commonly known as: PRINIVIL,ZESTRIL  What changed:   medication strength  how much to take  when to take this  additional instructions   10 mg, Oral, Every Morning      phenazopyridine 100 MG tablet  Commonly known as: PYRIDIUM  What changed:   medication strength  how much to take  when to take this  reasons to take  this   100 mg, Oral, 3 Times Daily With Meals             Continue These Medications        Instructions Start Date   amLODIPine 5 MG tablet  Commonly known as: NORVASC   5 mg, Oral, Every Morning      Calcium 600-200 MG-UNIT per tablet   1 tablet, 2 Times Daily      carBAMazepine 100 MG chewable tablet  Commonly known as: TEGretol   100 mg, Oral, Daily PRN      fluticasone 50 MCG/ACT nasal spray  Commonly known as: FLONASE   2 sprays, Daily      multivitamin with minerals tablet tablet   1 tablet, Every Morning      omeprazole 20 MG capsule  Commonly known as: priLOSEC   20 mg, Every Morning      propranolol 40 MG tablet  Commonly known as: INDERAL   40 mg, Oral, 2 Times Daily             Stop These Medications      acetaminophen 500 MG tablet  Commonly known as: TYLENOL     albuterol sulfate  (90 Base) MCG/ACT inhaler  Commonly known as: PROVENTIL HFA;VENTOLIN HFA;PROAIR HFA     cephalexin 500 MG capsule  Commonly known as: KEFLEX     oxyCODONE 5 MG capsule  Commonly known as: OXY-IR             Diet Instructions       Diet: Regular/House Diet, Cardiac Diets; Healthy Heart (2-3 Na+)      Discharge Diet:  Regular/House Diet  Cardiac Diets       Cardiac Diet: Healthy Heart (2-3 Na+)    Texture: Regular Texture (IDDSI 7)    Fluid Consistency: Thin (IDDSI 0)           Activity Instructions       Activity as Tolerated      Left Lower Extremity: Weight Bearing As Tolerated in a knee immobilizer. Recommend use of a walker for stability           Additional Instructions for the Follow-ups that You Need to Schedule       Call MD for problems / concerns.   As directed             Contact information for follow-up providers       Elizabeth Soto MD .    Specialty: Family Medicine  Contact information:  [address] [facility]  [address]  [place] [id number] [phone]               Triston Gonzales MD Follow up in 2 week(s).    Specialty: Hand Surgery  Contact information:  [address] [facility]  [address]  [place]  [place] [id number] [phone]               Maverick Park MD Follow up.    Specialty: Urology  Why: Urology is planning stent removal in a week  Contact information:  [address]  [facility] [address] [phone]                       Contact information for after-discharge care       Destination       [facility] .    Service: Skilled Nursing  Contact information:  [address]  [facility] [address]  [phone]                                  Future Appointments   Date Time Provider Department Center   3/24/2025  9:30 AM Elizabeth Soto MD [facility]   4/2/2025 10:30 AM REFERRED INJECTION CHAIR EP [facility]     Pending Labs       Order Current Status    Blood Culture - Blood, Hand, Right In process    Blood Culture - Blood, Hand, Right Preliminary result           Prashant Lazo MD  [facility]  12/25/24 08:49 EST    Discharge time spent greater than 30 minutes.    Electronically signed by Prashant Lazo MD at 12/25/24 0849       Discharge Order (From admission, onward)       Start     Ordered    12/25/24 0839  Discharge patient  Once        Expected Discharge Date: 12/25/24   Discharge Disposition: Skilled Nursing Facility (DC - External)   Physician of Record for Attribution - Please select from Treatment Team: MAVERICK PARK [[id number]]   Review needed by CMO to determine Physician of Record: No      Question Answer Comment   Physician of Record for Attribution - Please select from Treatment Team MAVERICK PARK    Review needed by CMO to determine Physician of Record No        12/25/24 5983

## 2024-12-25 NOTE — PLAN OF CARE
Problem: Adult Inpatient Plan of Care  Goal: Plan of Care Review  Outcome: Adequate for Care Transition  Flowsheets (Taken 12/25/2024 0845)  Plan of Care Reviewed With: patient  Goal: Patient-Specific Goal (Individualized)  Outcome: Adequate for Care Transition  Goal: Absence of Hospital-Acquired Illness or Injury  Outcome: Adequate for Care Transition  Intervention: Identify and Manage Fall Risk  Recent Flowsheet Documentation  Taken 12/25/2024 0840 by Macie Jay RN  Safety Promotion/Fall Prevention: safety round/check completed  Goal: Optimal Comfort and Wellbeing  Outcome: Adequate for Care Transition  Intervention: Monitor Pain and Promote Comfort  Recent Flowsheet Documentation  Taken 12/25/2024 0840 by Macie Jay RN  Pain Management Interventions: pain medication given  Goal: Readiness for Transition of Care  Outcome: Adequate for Care Transition     Problem: Comorbidity Management  Goal: Blood Pressure in Desired Range  Outcome: Adequate for Care Transition     Problem: Infection  Goal: Absence of Infection Signs and Symptoms  Outcome: Adequate for Care Transition     Problem: Acute Kidney Injury/Impairment  Goal: Fluid and Electrolyte Balance  Outcome: Adequate for Care Transition  Goal: Effective Renal Function  Outcome: Adequate for Care Transition     Problem: Skin Injury Risk Increased  Goal: Skin Health and Integrity  Outcome: Adequate for Care Transition     Problem: Fall Injury Risk  Goal: Absence of Fall and Fall-Related Injury  Outcome: Adequate for Care Transition  Intervention: Promote Injury-Free Environment  Recent Flowsheet Documentation  Taken 12/25/2024 0840 by Macie Jay RN  Safety Promotion/Fall Prevention: safety round/check completed   Goal Outcome Evaluation:  Plan of Care Reviewed With: patient

## 2024-12-25 NOTE — DISCHARGE SUMMARY
Date of Discharge:  12/25/2024    PCP: Elizabeth Soto MD    Discharge Diagnosis:   Active Hospital Problems    Diagnosis  POA    **Bacteremia [R78.81]  Yes    JAI (acute kidney injury) [N17.9]  Yes    Acute UTI [N39.0]  Yes    Acute pyelonephritis [N10]  Yes    Hyponatremia [E87.1]  Yes      Resolved Hospital Problems   No resolved problems to display.          Consults       Date and Time Order Name Status Description    12/23/2024  6:35 AM Inpatient Infectious Diseases Consult Completed     12/22/2024  3:35 PM Inpatient Orthopedic Surgery Consult Completed     12/21/2024  9:04 PM Inpatient Urology Consult Completed           Hospital Course  Patient is a 89 y.o. female with recent cystoscopy and left stent placement for left stone and pyelonephritis presented with urinary tract infection and JAI found to have pansensitive Pseudomonas in the urine but Klebsiella ESBL in 2 of 2 blood cultures. ID recommended 10 days of IV ertapenem (through 12/31/2024) for Klebsiella septicemia due to  source and midline was placed 12/24/2024. She was also put on some Pyridium and currently has some orange-colored urine. Urology plans stent removal in a week. JAI resolved and BP is a little high so her BP medications are being started back (lisinopril lower dose at first). She was also found to have possible small left patella fracture on CT and nondisplaced S4 fracture. Ortho recommended weightbearing as tolerated in knee immobilizer and walker for stability. Ortho would like to see her in a couple of weeks.    I discussed the patient's findings and my recommendations with patient and nursing staff    Temp:  [97.3 °F (36.3 °C)-98.2 °F (36.8 °C)] 98.2 °F (36.8 °C)  Heart Rate:  [62-75] 75  Resp:  [18] 18  BP: (121-153)/(56-84) 153/69  Body mass index is 25.34 kg/m².    Physical Exam  Constitutional:       General: She is not in acute distress.     Appearance: She is not toxic-appearing.   HENT:      Head: Normocephalic and  atraumatic.   Cardiovascular:      Rate and Rhythm: Normal rate and regular rhythm.   Pulmonary:      Effort: Pulmonary effort is normal. No respiratory distress.      Breath sounds: Normal breath sounds. No wheezing or rhonchi.   Abdominal:      General: Bowel sounds are normal.      Palpations: Abdomen is soft.      Tenderness: There is no abdominal tenderness. There is no guarding or rebound.   Musculoskeletal:      Comments: Left knee immobilizer   Skin:     General: Skin is warm and dry.   Neurological:      General: No focal deficit present.      Mental Status: She is alert and oriented to person, place, and time.   Psychiatric:         Mood and Affect: Mood normal.         Behavior: Behavior normal.       Disposition: Skilled Nursing Facility (DC - External)       Discharge Medications        New Medications        Instructions Start Date   ertapenem 1,000 mg in sodium chloride 0.9 % 100 mL IVPB   1,000 mg, Intravenous, Every 24 Hours      polyethylene glycol 17 g packet  Commonly known as: MIRALAX   17 g, Oral, Daily      sennosides-docusate 8.6-50 MG per tablet  Commonly known as: PERICOLACE   1 tablet, Oral, 2 Times Daily             Changes to Medications        Instructions Start Date   HYDROcodone-acetaminophen 5-325 MG per tablet  Commonly known as: NORCO  What changed: when to take this   1 tablet, Oral, Every 8 Hours PRN      lisinopril 10 MG tablet  Commonly known as: PRINIVIL,ZESTRIL  What changed:   medication strength  how much to take  when to take this  additional instructions   10 mg, Oral, Every Morning      phenazopyridine 100 MG tablet  Commonly known as: PYRIDIUM  What changed:   medication strength  how much to take  when to take this  reasons to take this   100 mg, Oral, 3 Times Daily With Meals             Continue These Medications        Instructions Start Date   amLODIPine 5 MG tablet  Commonly known as: NORVASC   5 mg, Oral, Every Morning      Calcium 600-200 MG-UNIT per tablet   1  tablet, 2 Times Daily      carBAMazepine 100 MG chewable tablet  Commonly known as: TEGretol   100 mg, Oral, Daily PRN      fluticasone 50 MCG/ACT nasal spray  Commonly known as: FLONASE   2 sprays, Daily      multivitamin with minerals tablet tablet   1 tablet, Every Morning      omeprazole 20 MG capsule  Commonly known as: priLOSEC   20 mg, Every Morning      propranolol 40 MG tablet  Commonly known as: INDERAL   40 mg, Oral, 2 Times Daily             Stop These Medications      acetaminophen 500 MG tablet  Commonly known as: TYLENOL     albuterol sulfate  (90 Base) MCG/ACT inhaler  Commonly known as: PROVENTIL HFA;VENTOLIN HFA;PROAIR HFA     cephalexin 500 MG capsule  Commonly known as: KEFLEX     oxyCODONE 5 MG capsule  Commonly known as: OXY-IR             Diet Instructions       Diet: Regular/House Diet, Cardiac Diets; Healthy Heart (2-3 Na+)      Discharge Diet:  Regular/House Diet  Cardiac Diets       Cardiac Diet: Healthy Heart (2-3 Na+)    Texture: Regular Texture (IDDSI 7)    Fluid Consistency: Thin (IDDSI 0)           Activity Instructions       Activity as Tolerated      Left Lower Extremity: Weight Bearing As Tolerated in a knee immobilizer. Recommend use of a walker for stability           Additional Instructions for the Follow-ups that You Need to Schedule       Call MD for problems / concerns.   As directed             Contact information for follow-up providers       Elizabeth Soto MD .    Specialty: Family Medicine  Contact information:  [address] [facility]  [address]  [facility] [id number] [phone]               Triston Gonzales MD Follow up in 2 week(s).    Specialty: Hand Surgery  Contact information:  [address]  [facility] [id number] [phone]               Maverick Park MD Follow up.    Specialty: Urology  Why: Urology is planning stent removal in a week  Contact information:  [address] [facility]  [address]  [facility] [id number] [phone]                        Contact information for after-discharge care       Destination       [facility] .    Service: Skilled Nursing  Contact information:  [address] [facility] [address]  [phone]                                  Future Appointments   Date Time Provider Department Center   3/24/2025  9:30 AM Elizabeth Soto MD [facility]   4/2/2025 10:30 AM REFERRED INJECTION CHAIR EP [facility] EP [facility]     Pending Labs       Order Current Status    Blood Culture - Blood, Hand, Right In process    Blood Culture - Blood, Hand, Right Preliminary result           Prashant Lazo MD  [facility]  12/25/24 08:49 EST    Discharge time spent greater than 30 minutes.

## 2024-12-25 NOTE — CASE MANAGEMENT/SOCIAL WORK
Case Management Discharge Note      Final Note: DC to [facility] via [facility]         Selected Continued Care - Discharged on 12/25/2024 Admission date: 12/21/2024 - Discharge disposition: Skilled Nursing Facility (DC - External)      Destination Coordination complete.      Service Provider Services Address Phone Fax Patient Preferred    [facility] [facility] [address] [phone] --              Durable Medical Equipment    No services have been selected for the patient.                Dialysis/Infusion    No services have been selected for the patient.                Home Medical Care    No services have been selected for the patient.                Therapy    No services have been selected for the patient.                Community Resources    No services have been selected for the patient.                Community & DME    No services have been selected for the patient.                    Transportation Services  Ambulance: [facility]    Final Discharge Disposition Code: 03 - skilled nursing facility (SNF)

## 2024-12-25 NOTE — NURSING NOTE
Handoff report called to Katlin RODRIGUEZ at [facility]. All questions answered.

## 2024-12-25 NOTE — CASE MANAGEMENT/SOCIAL WORK
Continued Stay Note  [facility]     Patient Name: Keyana Liz  MRN: [id number]  Today's Date: 12/25/2024    Admit Date: 12/21/2024    Plan: DC to [facility] at 1100   Discharge Plan       Row Name 12/25/24 1030       Plan    Plan DC to [facility] via [facility] at 1100    Plan Comments Pt ot be dc to [facility]. Prepared packet to nurse. DC summary faxed to facility.                   Discharge Codes    No documentation.                 Expected Discharge Date and Time       Expected Discharge Date Expected Discharge Time    Dec 25, 2024               Faviola Barnes RN

## 2024-12-26 LAB
COLOR STONE: NORMAL
COM MFR STONE: 100 %
COMPN STONE: NORMAL
LABORATORY COMMENT REPORT: NORMAL
LABORATORY COMMENT REPORT: NORMAL
Lab: NORMAL
Lab: NORMAL
PHOTO: NORMAL
SIZE STONE: NORMAL MM
SPEC SOURCE SUBJ: NORMAL
WT STONE: 285 MG

## 2024-12-29 LAB
BACTERIA SPEC AEROBE CULT: NORMAL
BACTERIA SPEC AEROBE CULT: NORMAL

## 2025-01-03 ENCOUNTER — TELEPHONE (OUTPATIENT)
Dept: CASE MANAGEMENT | Facility: OTHER | Age: OVER 89
End: 2025-01-03
Payer: MEDICARE

## 2025-01-03 NOTE — TELEPHONE ENCOUNTER
Called and spoke to [facility] Stephanie preciado, confirmed patient is admitted in their facility to rehab. Call transferred to DC planner - no answer, no VM set up unable to leave message. CCM to continue to follow for DC plans.

## 2025-01-13 ENCOUNTER — PATIENT OUTREACH (OUTPATIENT)
Dept: CASE MANAGEMENT | Facility: OTHER | Age: OVER 89
End: 2025-01-13
Payer: MEDICARE

## 2025-01-13 ENCOUNTER — READMISSION MANAGEMENT (OUTPATIENT)
Dept: CALL CENTER | Facility: HOSPITAL | Age: OVER 89
End: 2025-01-13
Payer: MEDICARE

## 2025-01-13 DIAGNOSIS — R78.81 BACTEREMIA: Primary | ICD-10-CM

## 2025-01-13 NOTE — OUTREACH NOTE
Prep Survey      Flowsheet Row Responses   [facility] facility patient discharged from? Non-BH   Is LACE score < 7 ? Non-BH Discharge   Eligibility [facility]   Date of Admission 12/25/24   Date of Discharge 01/15/25   Discharge Disposition Home-Health Care [facility]   Discharge diagnosis Bacteremia, JAI, acute UTI and pyelonephritis   Does the patient have one of the following disease processes/diagnoses(primary or secondary)? Other   Does the patient have Home health ordered? Yes   Prep survey completed? Yes            Soco DAILEY - Registered Nurse

## 2025-01-13 NOTE — OUTREACH NOTE
AMBULATORY CASE MANAGEMENT NOTE    Names and Relationships of Patient/Support Persons: Contact: Stephanie Tenorio; Relationship:  -     Care Coordination    Called and spoke to Stephanie Tenorio DC planner. Confirmed patient's DC date on 1/15/2025. Patient to DC to home with home health - unsure which but possibly caretenders. Patient has no preference.     Relaying to [facility].     Education Documentation  No documentation found.        Cindy LOPEZ  Ambulatory Case Management    1/13/2025, 11:24 EST

## 2025-01-15 ENCOUNTER — HOME HEALTH ADMISSION (OUTPATIENT)
Dept: HOME HEALTH SERVICES | Facility: HOME HEALTHCARE | Age: OVER 89
End: 2025-01-15
Payer: MEDICARE

## 2025-01-15 DIAGNOSIS — S82.002D CLOSED NONDISPLACED FRACTURE OF LEFT PATELLA WITH ROUTINE HEALING, UNSPECIFIED FRACTURE MORPHOLOGY, SUBSEQUENT ENCOUNTER: Primary | ICD-10-CM

## 2025-01-16 ENCOUNTER — HOME CARE VISIT (OUTPATIENT)
Dept: HOME HEALTH SERVICES | Facility: HOME HEALTHCARE | Age: OVER 89
End: 2025-01-16
Payer: MEDICARE

## 2025-01-16 ENCOUNTER — TRANSITIONAL CARE MANAGEMENT TELEPHONE ENCOUNTER (OUTPATIENT)
Dept: CALL CENTER | Facility: HOSPITAL | Age: OVER 89
End: 2025-01-16
Payer: MEDICARE

## 2025-01-16 PROCEDURE — G0299 HHS/HOSPICE OF RN EA 15 MIN: HCPCS

## 2025-01-16 NOTE — OUTREACH NOTE
Call Center TCM Note      Flowsheet Row Responses   [facility] patient discharged from? Non-  [Stephanie Tenorio]   Does the patient have one of the following disease processes/diagnoses(primary or secondary)? Other   TCM attempt successful? Yes  [VR for joe Salmeron]   Call start time 0843   Call end time 0856   Discharge diagnosis Bacteremia, JAI, acute UTI and pyelonephritis   Meds reviewed with patient/caregiver? Yes   Does the patient have all medications ordered at discharge? N/A   Prescription comments Pt reports there were no medication changes and regular meds were resumed   Is the patient taking all medications as directed (includes completed medication regime)? Yes   Comments pt requested appt that was scheduled to be cancelled as was too soon for her post discharge.  Pt also voiced preference for her PCP rather than another provider if possible.   Does the patient have an appointment with their PCP within 7-14 days of discharge? No appointments available   Nursing Interventions Routed TCM call to PCP office   What is the Home health agency?  HH with nursing, PT and OT to start today   Has home health visited the patient within 72 hours of discharge? Unsure   Psychosocial issues? No   Did the patient receive a copy of their discharge instructions? Yes   Nursing interventions Reviewed instructions with patient   What is the patient's perception of their health status since discharge? Same  [Pt reports she is doing better, still sounds as if getting adjusted to being home after being away for a lengthy time.  She is walking with a brace and to f/u with ortho.  Urinating frequently but no other issues with dysuria, stent has been removed.]   Is the patient/caregiver able to teach back signs and symptoms related to disease process for when to call PCP? Yes   Is the patient/caregiver able to teach back signs and symptoms related to disease process for when to call 911? Yes   Additional teach back comments  Pt also reports she needs to f/u with gyn regarding some issues.  Pt aware to monitor for s/s infection.   TCM call completed? Yes   Call end time 2517            Meg Craft RN    1/16/2025, 09:02 EST

## 2025-01-17 ENCOUNTER — PATIENT OUTREACH (OUTPATIENT)
Dept: CASE MANAGEMENT | Facility: OTHER | Age: OVER 89
End: 2025-01-17
Payer: MEDICARE

## 2025-01-17 ENCOUNTER — HOME CARE VISIT (OUTPATIENT)
Dept: HOME HEALTH SERVICES | Facility: HOME HEALTHCARE | Age: OVER 89
End: 2025-01-17
Payer: MEDICARE

## 2025-01-17 VITALS
HEART RATE: 56 BPM | DIASTOLIC BLOOD PRESSURE: 76 MMHG | TEMPERATURE: 97.9 F | RESPIRATION RATE: 18 BRPM | OXYGEN SATURATION: 95 % | SYSTOLIC BLOOD PRESSURE: 136 MMHG

## 2025-01-17 PROCEDURE — G0151 HHCP-SERV OF PT,EA 15 MIN: HCPCS

## 2025-01-17 NOTE — HOME HEALTH
Pt is a 89 yo female who lives alone.  She was admitted to the hospital and then rehab for Bacteremia, JAI, UTI, Acute pyelonephritis, Hyponatremia.  Pt suffered a fall at home and  Left knee transverse nondisplaced patellar fracture sustained 12/21/2024 and is in an immobilizer at this time.  Pt believes she is very near her plof, except that she has not made it up her steps (via chair lift) to her quilting room which is her main hobby.  PT will see her short term for HEP and to address the chair lift and safety with that.  She has MD f/u appt next Friday.

## 2025-01-17 NOTE — HOME HEALTH
"SOC Note: Patient high risk with hospitalizations 5x in last 3 months. Reviewed hospital DC instructions with patient. Instructed on med changes and compliance with times and doses of med reg with theraputic effect. Instructed to ambulate with walker and assist x 1. Instructed to continue to use incentive spirometer, cough and deep breathing exercises 4 times daily for 5-7 days. Patient able to voice back understanding on instructions provided.    Home Health ordered for: disciplines SN 1w1, 2w3, 1w3, 2PRN, PT/OT services    Reason for Hosp/Primary Dx/Co-morbidities: Adm to [facility]: 12/21/2024 - 12/25/2024 Discharge Diagnosis: Bacteremia, JAI (acute kidney injury), Acute UTI, Acute pyelonephritis, Hyponatremia. Adm to [facility] 12/25/2024 - 1/14/2025 with IV abx and healing from UTI. Left knee transverse nondisplaced patellar fracture sustained 12/21/2024 after a fall onto the knee.     SN FOC is Weakness S/P fall with comorbities of HTN, JAI, Hyponatremia. SN to provide skilled care of Medication education, Pain management, Cardiopulmonary assessments, Neurovascular assessments.      Patient's goal(s):\"To get stronger\"    Current Functional status/mobility/DME: Amb with walker    HB status/Living Arrangements: Lives in [facility]     Skin Integrity/wound status: Intact    Code Status: Full    Fall Risk/Safety concerns: High    Educated on Emergency Plan, steps to take prior to going to the ER and when to Call Home Health First:  yes     Medication issues/Concerns: Added: polyethylene glycol (MIRALAX) 17 g packet Take 17 g by mouth Daily. Added: sennosides-docusate (PERICOLACE) 8.6-50 MG per tablet Take 1 tablet by mouth 2 (Two) Times a Day. Changed: amLODIPine (NORVASC) 5 MG tablet Take 1 tablet by mouth Every Morning. Changed: HYDROcodone-acetaminophen (NORCO) 5-325 MG per tablet Take 1 tablet by mouth Every 8 (Eight) Hours As Needed for Moderate Pain for up to 3 days. Changed: lisinopril (PRINIVIL,ZESTRIL) 10 " MG tablet Take 1 tablet by mouth Every Morning. Changed: phenazopyridine (PYRIDIUM) 100 MG tablet Take 1 tablet by mouth 3 (Three) Times a Day With Meals for 3 doses. Calcium 600-200 MG-UNIT per tablet Take 1 tablet by mouth 2 (Two) Times a Day.     Additional Problems/Concerns: None    SDOH Barriers (i.e. caregiver concerns, social isolation, transportation, food insecurity, environment, income etc.)/Need for MSW: None

## 2025-01-17 NOTE — OUTREACH NOTE
AMBULATORY CASE MANAGEMENT NOTE    Names and Relationships of Patient/Support Persons: Contact: Keyana Liz; Relationship: Self -     CCM Interim Update    Called and spoke to patient for CCM services after Hospital - Rehab stay. Introduced self, role and reason for the call. Informed of CCM program, goals and benefits. Offered CCM, patient refused at this time. Patient currently have Home Health services, have all medications she was sent home with. PCP f/u appt on 1/24/2025. Patient lives alone and she states she likes it that way and is managing well at this time. She denies needing further social support. Patient denies any urgent needs or concerns at this time.     Education Documentation  No documentation found.        Cindy LOPEZ  Ambulatory Case Management    1/17/2025, 14:01 EST

## 2025-01-19 VITALS
BODY MASS INDEX: 23.34 KG/M2 | TEMPERATURE: 97.6 F | OXYGEN SATURATION: 98 % | SYSTOLIC BLOOD PRESSURE: 136 MMHG | DIASTOLIC BLOOD PRESSURE: 72 MMHG | RESPIRATION RATE: 18 BRPM | WEIGHT: 149 LBS | HEART RATE: 68 BPM

## 2025-01-20 ENCOUNTER — HOME CARE VISIT (OUTPATIENT)
Dept: HOME HEALTH SERVICES | Facility: HOME HEALTHCARE | Age: OVER 89
End: 2025-01-20
Payer: MEDICARE

## 2025-01-20 VITALS
RESPIRATION RATE: 20 BRPM | DIASTOLIC BLOOD PRESSURE: 68 MMHG | OXYGEN SATURATION: 100 % | HEART RATE: 68 BPM | SYSTOLIC BLOOD PRESSURE: 132 MMHG | TEMPERATURE: 96.8 F

## 2025-01-20 PROCEDURE — G0162 HHC RN E&M PLAN SVS, 15 MIN: HCPCS

## 2025-01-22 ENCOUNTER — HOME CARE VISIT (OUTPATIENT)
Dept: HOME HEALTH SERVICES | Facility: HOME HEALTHCARE | Age: OVER 89
End: 2025-01-22
Payer: MEDICARE

## 2025-01-22 ENCOUNTER — TELEPHONE (OUTPATIENT)
Dept: FAMILY MEDICINE CLINIC | Facility: CLINIC | Age: OVER 89
End: 2025-01-22
Payer: MEDICARE

## 2025-01-22 PROCEDURE — G0152 HHCP-SERV OF OT,EA 15 MIN: HCPCS

## 2025-01-22 NOTE — Clinical Note
OT evaluation completed, plan for 2x/week for 1 week to address adl training, functional transfers, mobility, safety, energy conservation techniques.

## 2025-01-22 NOTE — TELEPHONE ENCOUNTER
Neda from [facility] called to get verbal okay to do a UA on the patient. She is complaining of urinary burning and just had a UTI. Verbal orders given.

## 2025-01-23 ENCOUNTER — HOME CARE VISIT (OUTPATIENT)
Dept: HOME HEALTH SERVICES | Facility: HOME HEALTHCARE | Age: OVER 89
End: 2025-01-23
Payer: MEDICARE

## 2025-01-23 ENCOUNTER — LAB REQUISITION (OUTPATIENT)
Dept: LAB | Facility: HOSPITAL | Age: OVER 89
End: 2025-01-23
Payer: MEDICARE

## 2025-01-23 VITALS
HEART RATE: 68 BPM | SYSTOLIC BLOOD PRESSURE: 140 MMHG | DIASTOLIC BLOOD PRESSURE: 70 MMHG | TEMPERATURE: 97.8 F | OXYGEN SATURATION: 98 %

## 2025-01-23 VITALS
OXYGEN SATURATION: 98 % | SYSTOLIC BLOOD PRESSURE: 144 MMHG | HEART RATE: 68 BPM | TEMPERATURE: 96.6 F | RESPIRATION RATE: 20 BRPM | DIASTOLIC BLOOD PRESSURE: 68 MMHG

## 2025-01-23 DIAGNOSIS — N39.0 URINARY TRACT INFECTION, SITE NOT SPECIFIED: ICD-10-CM

## 2025-01-23 LAB
BACTERIA UR QL AUTO: ABNORMAL /HPF
BILIRUB UR QL STRIP: NEGATIVE
CLARITY UR: ABNORMAL
COLOR UR: YELLOW
GLUCOSE UR STRIP-MCNC: NEGATIVE MG/DL
HGB UR QL STRIP.AUTO: ABNORMAL
HYALINE CASTS UR QL AUTO: ABNORMAL /LPF
KETONES UR QL STRIP: NEGATIVE
LEUKOCYTE ESTERASE UR QL STRIP.AUTO: ABNORMAL
NITRITE UR QL STRIP: NEGATIVE
PH UR STRIP.AUTO: 7 [PH] (ref 5–8)
PROT UR QL STRIP: NEGATIVE
RBC # UR STRIP: ABNORMAL /HPF
REF LAB TEST METHOD: ABNORMAL
SP GR UR STRIP: <=1.005 (ref 1–1.03)
SQUAMOUS #/AREA URNS HPF: ABNORMAL /HPF
UROBILINOGEN UR QL STRIP: ABNORMAL
WBC # UR STRIP: ABNORMAL /HPF

## 2025-01-23 PROCEDURE — 87186 SC STD MICRODIL/AGAR DIL: CPT | Performed by: FAMILY MEDICINE

## 2025-01-23 PROCEDURE — 87086 URINE CULTURE/COLONY COUNT: CPT | Performed by: FAMILY MEDICINE

## 2025-01-23 PROCEDURE — G0300 HHS/HOSPICE OF LPN EA 15 MIN: HCPCS

## 2025-01-23 PROCEDURE — G0299 HHS/HOSPICE OF RN EA 15 MIN: HCPCS

## 2025-01-23 PROCEDURE — 87077 CULTURE AEROBIC IDENTIFY: CPT | Performed by: FAMILY MEDICINE

## 2025-01-23 PROCEDURE — 81001 URINALYSIS AUTO W/SCOPE: CPT | Performed by: FAMILY MEDICINE

## 2025-01-23 NOTE — HOME HEALTH
Reason for Referral: Recent hospitalization due to Bacteremia, UTI, JAI, pyelonephritis, hyponatremia, and left knee transverse nondisplaced patellar fracture from fall    Subjective: Patient reports doing better    Home Environment:Patient lives alone, has stair lift, walker with tray, LLE immobilizer    PLOF: Independent    Medical Necessity: Patient requires skilled occupational therapy for remediation of deficits to improve safety in home and improve self care, functional transfers, mobility, strength, endurance, DME/adaptive equipment, energy conservation     Plan for Next Visit: safety with adl tasks

## 2025-01-24 ENCOUNTER — OFFICE VISIT (OUTPATIENT)
Dept: FAMILY MEDICINE CLINIC | Facility: CLINIC | Age: OVER 89
End: 2025-01-24
Payer: MEDICARE

## 2025-01-24 ENCOUNTER — HOME CARE VISIT (OUTPATIENT)
Dept: HOME HEALTH SERVICES | Facility: HOME HEALTHCARE | Age: OVER 89
End: 2025-01-24
Payer: MEDICARE

## 2025-01-24 VITALS
BODY MASS INDEX: 22.79 KG/M2 | HEART RATE: 70 BPM | HEIGHT: 67 IN | WEIGHT: 145.2 LBS | OXYGEN SATURATION: 99 % | TEMPERATURE: 98.9 F | SYSTOLIC BLOOD PRESSURE: 124 MMHG | DIASTOLIC BLOOD PRESSURE: 78 MMHG

## 2025-01-24 DIAGNOSIS — N10 ACUTE PYELONEPHRITIS: ICD-10-CM

## 2025-01-24 DIAGNOSIS — S82.002S CLOSED NONDISPLACED FRACTURE OF LEFT PATELLA, UNSPECIFIED FRACTURE MORPHOLOGY, SEQUELA: ICD-10-CM

## 2025-01-24 DIAGNOSIS — Z09 HOSPITAL DISCHARGE FOLLOW-UP: Primary | ICD-10-CM

## 2025-01-24 DIAGNOSIS — R78.81 BACTEREMIA: ICD-10-CM

## 2025-01-24 DIAGNOSIS — N39.0 ACUTE UTI: ICD-10-CM

## 2025-01-24 DIAGNOSIS — N17.9 AKI (ACUTE KIDNEY INJURY): ICD-10-CM

## 2025-01-24 DIAGNOSIS — E87.1 HYPONATREMIA: ICD-10-CM

## 2025-01-24 PROCEDURE — G0151 HHCP-SERV OF PT,EA 15 MIN: HCPCS

## 2025-01-24 PROCEDURE — 1126F AMNT PAIN NOTED NONE PRSNT: CPT

## 2025-01-24 PROCEDURE — 1111F DSCHRG MED/CURRENT MED MERGE: CPT

## 2025-01-24 PROCEDURE — 1159F MED LIST DOCD IN RCRD: CPT

## 2025-01-24 PROCEDURE — 99495 TRANSJ CARE MGMT MOD F2F 14D: CPT

## 2025-01-24 PROCEDURE — 1160F RVW MEDS BY RX/DR IN RCRD: CPT

## 2025-01-24 RX ORDER — SULFAMETHOXAZOLE AND TRIMETHOPRIM 800; 160 MG/1; MG/1
1 TABLET ORAL 2 TIMES DAILY
Qty: 10 TABLET | Refills: 0 | Status: SHIPPED | OUTPATIENT
Start: 2025-01-24

## 2025-01-24 NOTE — PROGRESS NOTES
Transitional Care Follow Up Visit  Subjective     Keyana Liz is a 89 y.o. female who presents for a transitional care management visit.    Within 48 business hours after discharge our office contacted her via telephone to coordinate her care and needs.      I reviewed and discussed the details of that call along with the discharge summary, hospital problems, inpatient lab results, inpatient diagnostic studies, and consultation reports with Keyana.     Current outpatient and discharge medications have been reconciled for the patient.  Reviewed by: KATE Mckeon          1/13/2025    12:24 PM   Date of TCM Phone Call   [facility]   Date of Admission 12/25/2024   Date of Discharge 1/15/2025   Discharge Disposition Home-Health Care S[facility]     Risk for Readmission (LACE) No data recorded  History of Present Illness   Course During Hospital Stay:  12/21/24-12/25/24  Patient is a 89 y.o. female with recent cystoscopy and left stent placement for left stone and pyelonephritis presented with urinary tract infection and JAI found to have pansensitive Pseudomonas in the urine but Klebsiella ESBL in 2 of 2 blood cultures. ID recommended 10 days of IV ertapenem (through 12/31/2024) for Klebsiella septicemia due to  source and midline was placed 12/24/2024. She was also put on some Pyridium and currently has some orange-colored urine. Urology plans stent removal in a week. JAI resolved and BP is a little high so her BP medications are being started back (lisinopril lower dose at first). She was also found to have possible small left patella fracture on CT and nondisplaced S4 fracture. Ortho recommended weightbearing as tolerated in knee immobilizer and walker for stability. Ortho would like to see her in a couple of weeks.     [facility] 12/25[facility]24-01/15/25; went there for Skilled nursing and PT.      Ortho; going to cancel 02/13/25 due to no pain;  01/09/25  1. Discontinue knee immobilizer  at this time.   2. She was provided with a left hinged knee brace to be weaned over the next 3 weeks as noted above.   3. Physical therapy twice a week for 6 weeks for weightbearing range of motion. Okay to bear weight in the left lower extremity as tolerated.   4. Follow-up in 1 month, sooner if needed. Okay to cancel if she is well.         01/25/25  Home health: SN twice a week still until next week.  PT released today.  OT unsure, may be released next week.    UTI; started on bactrim yesterday. Ua sent for culture.  Ongoing for awhile, thought maybe it was the stent.  Last week, symptoms worsen; dysuria frequency urgency, flank pain sometimes.  Denies fever, chills, NVD.  Brief Urine Lab Results  (Last result in the past 365 days)        Color   Clarity   Blood   Leuk Est   Nitrite   Protein   CREAT   Urine HCG        01/23/25 1240 Yellow   Turbid   Small (1+)   Large (3+)   Negative   Negative                     Doing well with knee immobilizer, some pain on side of knee, haven't needed to take anything for pain.  No picc line anymore, d/c right before she left rehab.  Had some blood work done in rehab.   Lives at home by herself.  Pcp appt 03/24/25      Abnormal US (ultrasound) of abdomen referral to gyn/oncology  IMPRESSION:  1.  The endometrium has a thickened, heterogenous appearance which given  patient age this concerning for neoplasm/malignancy. Gynecologic oncology referral is recommended for further work-up and direct  visualization.    Patient reports she never followed up due to being in the hospital and rehab. Phone number provided to her today to call and schedule appt.        The following portions of the patient's history were reviewed and updated as appropriate: She  has a past medical history of Acute bronchitis, Allergic rhinitis, Analgesic overuse headache, Arthritis, Benign essential hypertension, Benign paroxysmal positional vertigo, Cancer, Chronic renal insufficiency, Cluster headache,  Constipation, Cough, Dehydration, Dizziness, Dysuria, Fall at home, Fatigue, Hypertension, Hypertension, uncontrolled, Hypertensive urgency, Increased frequency of urination, Kidney stones, Osteoporosis, Otitis media, Postmenopausal HRT (hormone replacement therapy), Short-term memory loss, Shoulder pain, left, Sleep apnea, TACS (trigeminal autonomic cephalgias), Urinary frequency, UTI symptoms, Vertigo, and Vitamin B12 deficiency.  She does not have any pertinent problems on file.  She  has a past surgical history that includes Mastectomy; Femur fracture surgery (Left); Femur IM Nailing/Rodding (Left, 3/16/2019); Lumbar epidural injection (N/A, 10/27/2021); Lumbar epidural injection (N/A, 12/13/2021); Cystoscopy w/ ureteral stent placement (N/A, 10/20/2024); Extracorporeal shock wave lithotripsy (Left, 11/4/2024); Extracorporeal shock wave lithotripsy (Left, 11/20/2024); and ureteroscopy laser lithotripsy with stent insertion (Left, 12/19/2024).  Her family history includes No Known Problems in her father and mother.  She  reports that she has never smoked. She has never been exposed to tobacco smoke. She has never used smokeless tobacco. She reports that she does not drink alcohol and does not use drugs.  Current Outpatient Medications   Medication Sig Dispense Refill    acetaminophen (TYLENOL) 500 MG tablet Take 1 tablet by mouth Every 4 (Four) Hours As Needed for Mild Pain. Indications: Pain      amLODIPine (NORVASC) 5 MG tablet Take 1 tablet by mouth Every Morning.      Calcium 600-200 MG-UNIT per tablet Take 1 tablet by mouth 2 (Two) Times a Day.      calcium carbonate (OS-CARLY) 600 MG tablet Take 1 tablet by mouth Daily. Indications: Low Amount of Calcium in the Blood      carBAMazepine (TEGretol) 100 MG chewable tablet Chew 1 tablet Daily As Needed (cluster headache). 30 tablet 0    fluticasone (FLONASE) 50 MCG/ACT nasal spray Administer 2 sprays into the nostril(s) as directed by provider Daily As Needed.  "Indications: Stuffy Nose      lisinopril (PRINIVIL,ZESTRIL) 10 MG tablet Take 1 tablet by mouth Every Morning.      Multiple Vitamins-Minerals (MULTIVITAMIN WITH MINERALS) tablet tablet Take 1 tablet by mouth Every Morning. Indications: Vitamin and/or Mineral Deficiency      omeprazole (priLOSEC) 20 MG capsule Take 1 capsule by mouth Every Morning. Indications: Gastroesophageal Reflux Disease      polyethylene glycol (MIRALAX) 17 g packet Take 17 g by mouth Daily.      propranolol (INDERAL) 40 MG tablet Take 1 tablet by mouth 2 (Two) Times a Day. 180 tablet 1    sulfamethoxazole-trimethoprim (Bactrim DS) 800-160 MG per tablet Take 1 tablet by mouth 2 (Two) Times a Day. Indications: Urinary Tract Infection 10 tablet 0     No current facility-administered medications for this visit.     She is allergic to [facility] diuretic maximum strength [pamabrom]..        Objective   /78   Pulse 70   Temp 98.9 °F (37.2 °C) (Temporal)   Ht 170.2 cm (67.01\")   Wt 65.9 kg (145 lb 3.2 oz)   SpO2 99%   BMI 22.74 kg/m²   Physical Exam  Vitals reviewed.   Constitutional:       General: She is not in acute distress.     Appearance: Normal appearance.   HENT:      Head: Normocephalic and atraumatic.      Nose: Nose normal. No congestion.      Mouth/Throat:      Mouth: Mucous membranes are moist.      Pharynx: No oropharyngeal exudate or posterior oropharyngeal erythema.   Eyes:      Conjunctiva/sclera: Conjunctivae normal.      Pupils: Pupils are equal, round, and reactive to light.   Cardiovascular:      Rate and Rhythm: Normal rate and regular rhythm.      Pulses: Normal pulses.      Heart sounds: No murmur heard.     No gallop.   Pulmonary:      Effort: Pulmonary effort is normal. No respiratory distress.      Breath sounds: Normal breath sounds. No wheezing.   Abdominal:      General: Bowel sounds are normal. There is no distension.      Palpations: Abdomen is soft.      Tenderness: There is no abdominal tenderness. There is no " right CVA tenderness or left CVA tenderness.   Musculoskeletal:      Cervical back: Normal range of motion and neck supple. No tenderness.      Left knee: Decreased range of motion.      Comments: Wearing knee immobilizer.   Skin:     General: Skin is warm and dry.   Neurological:      Mental Status: She is alert and oriented to person, place, and time. Mental status is at baseline.   Psychiatric:         Mood and Affect: Mood normal.         Assessment & Plan   Problems Addressed this Visit       Hyponatremia    Acute pyelonephritis    Acute UTI    JAI (acute kidney injury)    Bacteremia    Hospital discharge follow-up - Primary    Closed fracture of left patella     Diagnoses         Codes Comments    Hospital discharge follow-up    -  Primary ICD-10-CM: Z09  ICD-9-CM: V67.59     Bacteremia     ICD-10-CM: R78.81  ICD-9-CM: 790.7     Acute UTI     ICD-10-CM: N39.0  ICD-9-CM: 599.0     Acute pyelonephritis     ICD-10-CM: N10  ICD-9-CM: 590.10     Hyponatremia     ICD-10-CM: E87.1  ICD-9-CM: 276.1     JAI (acute kidney injury)     ICD-10-CM: N17.9  ICD-9-CM: 584.9     Closed nondisplaced fracture of left patella, unspecified fracture morphology, sequela     ICD-10-CM: S82.002S  ICD-9-CM: 905.4         Will follow up with urine culture results.  Continue bactrim.  Follow up with specialists as scheduled.  Daughter present with patient.  Gyn/onc number provided to patient so she can follow up on abnormal ultrasound.

## 2025-01-25 VITALS
DIASTOLIC BLOOD PRESSURE: 72 MMHG | HEART RATE: 80 BPM | SYSTOLIC BLOOD PRESSURE: 140 MMHG | TEMPERATURE: 98 F | OXYGEN SATURATION: 97 % | RESPIRATION RATE: 18 BRPM

## 2025-01-25 PROBLEM — D64.9 ANEMIA: Status: ACTIVE | Noted: 2024-12-25

## 2025-01-25 PROBLEM — K59.00 CONSTIPATION, UNSPECIFIED: Status: ACTIVE | Noted: 2024-12-25

## 2025-01-25 PROBLEM — Z09 HOSPITAL DISCHARGE FOLLOW-UP: Status: ACTIVE | Noted: 2025-01-25

## 2025-01-25 PROBLEM — M25.562 ARTHRALGIA OF LEFT KNEE: Status: ACTIVE | Noted: 2025-01-09

## 2025-01-25 PROBLEM — S82.002D: Status: ACTIVE | Noted: 2024-12-25

## 2025-01-25 PROBLEM — S82.002A CLOSED FRACTURE OF LEFT PATELLA: Status: ACTIVE | Noted: 2025-01-09

## 2025-01-25 NOTE — HOME HEALTH
Pt reports she is doing great and at her plof regarding PT.  She is ambulating safely in home with rwx.  She is using the lift to get up the steps to her craft rooms.  She denies any pain today and knows her HEP.  SN will continue with patient.  Pt is going to PCP and picking up antibiotic for newly dx UTI.

## 2025-01-26 LAB — BACTERIA SPEC AEROBE CULT: ABNORMAL

## 2025-01-27 ENCOUNTER — HOME CARE VISIT (OUTPATIENT)
Dept: HOME HEALTH SERVICES | Facility: HOME HEALTHCARE | Age: OVER 89
End: 2025-01-27
Payer: MEDICARE

## 2025-01-27 DIAGNOSIS — N39.0 ACUTE UTI: Primary | ICD-10-CM

## 2025-01-27 PROCEDURE — G0300 HHS/HOSPICE OF LPN EA 15 MIN: HCPCS

## 2025-01-27 RX ORDER — LEVOFLOXACIN 250 MG/1
750 TABLET, FILM COATED ORAL DAILY
Qty: 5 TABLET | Refills: 0 | Status: SHIPPED | OUTPATIENT
Start: 2025-01-27

## 2025-01-28 VITALS — TEMPERATURE: 99 F | OXYGEN SATURATION: 98 % | RESPIRATION RATE: 18 BRPM | HEART RATE: 58 BPM

## 2025-01-29 ENCOUNTER — HOME CARE VISIT (OUTPATIENT)
Dept: HOME HEALTH SERVICES | Facility: HOME HEALTHCARE | Age: OVER 89
End: 2025-01-29
Payer: MEDICARE

## 2025-01-29 VITALS
DIASTOLIC BLOOD PRESSURE: 64 MMHG | HEART RATE: 66 BPM | SYSTOLIC BLOOD PRESSURE: 130 MMHG | OXYGEN SATURATION: 96 % | TEMPERATURE: 98.1 F

## 2025-01-29 PROCEDURE — G0152 HHCP-SERV OF OT,EA 15 MIN: HCPCS

## 2025-01-30 ENCOUNTER — HOME CARE VISIT (OUTPATIENT)
Dept: HOME HEALTH SERVICES | Facility: HOME HEALTHCARE | Age: OVER 89
End: 2025-01-30
Payer: MEDICARE

## 2025-01-30 PROCEDURE — G0300 HHS/HOSPICE OF LPN EA 15 MIN: HCPCS

## 2025-02-03 ENCOUNTER — HOME CARE VISIT (OUTPATIENT)
Dept: HOME HEALTH SERVICES | Facility: HOME HEALTHCARE | Age: OVER 89
End: 2025-02-03
Payer: MEDICARE

## 2025-02-03 VITALS
WEIGHT: 142 LBS | OXYGEN SATURATION: 97 % | BODY MASS INDEX: 22.24 KG/M2 | HEART RATE: 61 BPM | SYSTOLIC BLOOD PRESSURE: 132 MMHG | DIASTOLIC BLOOD PRESSURE: 62 MMHG | TEMPERATURE: 98.4 F | RESPIRATION RATE: 18 BRPM

## 2025-02-03 PROCEDURE — G0299 HHS/HOSPICE OF RN EA 15 MIN: HCPCS

## 2025-02-06 ENCOUNTER — HOME CARE VISIT (OUTPATIENT)
Dept: HOME HEALTH SERVICES | Facility: HOME HEALTHCARE | Age: OVER 89
End: 2025-02-06
Payer: MEDICARE

## 2025-02-06 VITALS
SYSTOLIC BLOOD PRESSURE: 142 MMHG | RESPIRATION RATE: 20 BRPM | TEMPERATURE: 96.5 F | DIASTOLIC BLOOD PRESSURE: 78 MMHG | OXYGEN SATURATION: 100 % | HEART RATE: 64 BPM

## 2025-02-06 PROCEDURE — G0495 RN CARE TRAIN/EDU IN HH: HCPCS

## 2025-02-10 VITALS
TEMPERATURE: 97.9 F | HEART RATE: 58 BPM | SYSTOLIC BLOOD PRESSURE: 120 MMHG | RESPIRATION RATE: 18 BRPM | DIASTOLIC BLOOD PRESSURE: 58 MMHG | OXYGEN SATURATION: 100 %

## 2025-02-11 ENCOUNTER — HOME CARE VISIT (OUTPATIENT)
Dept: HOME HEALTH SERVICES | Facility: HOME HEALTHCARE | Age: OVER 89
End: 2025-02-11
Payer: MEDICARE

## 2025-02-11 VITALS
OXYGEN SATURATION: 98 % | SYSTOLIC BLOOD PRESSURE: 112 MMHG | TEMPERATURE: 98.2 F | RESPIRATION RATE: 18 BRPM | HEART RATE: 52 BPM | DIASTOLIC BLOOD PRESSURE: 52 MMHG

## 2025-02-11 PROCEDURE — G0299 HHS/HOSPICE OF RN EA 15 MIN: HCPCS

## 2025-02-20 ENCOUNTER — HOME CARE VISIT (OUTPATIENT)
Dept: HOME HEALTH SERVICES | Facility: HOME HEALTHCARE | Age: OVER 89
End: 2025-02-20
Payer: MEDICARE

## 2025-02-20 VITALS
OXYGEN SATURATION: 96 % | HEART RATE: 64 BPM | RESPIRATION RATE: 16 BRPM | SYSTOLIC BLOOD PRESSURE: 102 MMHG | DIASTOLIC BLOOD PRESSURE: 64 MMHG | TEMPERATURE: 97.2 F

## 2025-02-20 PROCEDURE — G0162 HHC RN E&M PLAN SVS, 15 MIN: HCPCS

## 2025-02-28 ENCOUNTER — HOME CARE VISIT (OUTPATIENT)
Dept: HOME HEALTH SERVICES | Facility: HOME HEALTHCARE | Age: OVER 89
End: 2025-02-28
Payer: MEDICARE

## 2025-02-28 VITALS
DIASTOLIC BLOOD PRESSURE: 62 MMHG | SYSTOLIC BLOOD PRESSURE: 120 MMHG | OXYGEN SATURATION: 98 % | RESPIRATION RATE: 20 BRPM | TEMPERATURE: 97.1 F | HEART RATE: 64 BPM

## 2025-02-28 PROCEDURE — G0299 HHS/HOSPICE OF RN EA 15 MIN: HCPCS

## 2025-03-24 ENCOUNTER — OFFICE VISIT (OUTPATIENT)
Dept: FAMILY MEDICINE CLINIC | Facility: CLINIC | Age: OVER 89
End: 2025-03-24
Payer: MEDICARE

## 2025-03-24 VITALS
OXYGEN SATURATION: 99 % | WEIGHT: 148.8 LBS | HEIGHT: 67 IN | HEART RATE: 54 BPM | TEMPERATURE: 98.7 F | DIASTOLIC BLOOD PRESSURE: 74 MMHG | SYSTOLIC BLOOD PRESSURE: 130 MMHG | BODY MASS INDEX: 23.35 KG/M2

## 2025-03-24 DIAGNOSIS — Z00.00 MEDICARE ANNUAL WELLNESS VISIT, SUBSEQUENT: Primary | ICD-10-CM

## 2025-03-24 DIAGNOSIS — M48.062 SPINAL STENOSIS, LUMBAR REGION, WITH NEUROGENIC CLAUDICATION: ICD-10-CM

## 2025-03-24 DIAGNOSIS — I10 BENIGN ESSENTIAL HYPERTENSION: ICD-10-CM

## 2025-03-24 DIAGNOSIS — M54.16 LUMBAR RADICULOPATHY: ICD-10-CM

## 2025-03-24 DIAGNOSIS — G44.019 EPISODIC CLUSTER HEADACHE, NOT INTRACTABLE: ICD-10-CM

## 2025-03-24 PROCEDURE — 1170F FXNL STATUS ASSESSED: CPT | Performed by: FAMILY MEDICINE

## 2025-03-24 PROCEDURE — 1159F MED LIST DOCD IN RCRD: CPT | Performed by: FAMILY MEDICINE

## 2025-03-24 PROCEDURE — 99214 OFFICE O/P EST MOD 30 MIN: CPT | Performed by: FAMILY MEDICINE

## 2025-03-24 PROCEDURE — G2211 COMPLEX E/M VISIT ADD ON: HCPCS | Performed by: FAMILY MEDICINE

## 2025-03-24 PROCEDURE — G0439 PPPS, SUBSEQ VISIT: HCPCS | Performed by: FAMILY MEDICINE

## 2025-03-24 PROCEDURE — 1125F AMNT PAIN NOTED PAIN PRSNT: CPT | Performed by: FAMILY MEDICINE

## 2025-03-24 PROCEDURE — 1160F RVW MEDS BY RX/DR IN RCRD: CPT | Performed by: FAMILY MEDICINE

## 2025-03-24 NOTE — PROGRESS NOTES
Subjective   The ABCs of the Annual Wellness Visit  Medicare Wellness Visit      Keyana Liz is a 89 y.o. patient who presents for a Medicare Wellness Visit.    The following portions of the patient's history were reviewed and   updated as appropriate: allergies, current medications, past family history, past medical history, past social history, past surgical history, and problem list.    Compared to one year ago, the patient's physical   health is the same.  Compared to one year ago, the patient's mental   health is the same.    Recent Hospitalizations:  This patient has had a [facility] admission record on file within the last 365 days.  Current Medical Providers:  Patient Care Team:  Elizabeth Soto MD as PCP - General (Family Medicine)    Outpatient Medications Prior to Visit   Medication Sig Dispense Refill    acetaminophen (TYLENOL) 500 MG tablet Take 1 tablet by mouth Every 4 (Four) Hours As Needed for Mild Pain. Indications: Pain      amLODIPine (NORVASC) 5 MG tablet Take 1 tablet by mouth Every Morning.      Calcium 600-200 MG-UNIT per tablet Take 1 tablet by mouth 2 (Two) Times a Day.      calcium carbonate (OS-CARLY) 600 MG tablet Take 1 tablet by mouth Daily. Indications: Low Amount of Calcium in the Blood      carBAMazepine (TEGretol) 100 MG chewable tablet Chew 1 tablet Daily As Needed (cluster headache). 30 tablet 0    fluticasone (FLONASE) 50 MCG/ACT nasal spray Administer 2 sprays into the nostril(s) as directed by provider Daily As Needed for Allergies or Rhinitis. Indications: Stuffy Nose      levoFLOXacin (Levaquin) 250 MG tablet Take 3 tablets by mouth Daily. Indications: Urinary Tract Infection 5 tablet 0    lisinopril (PRINIVIL,ZESTRIL) 10 MG tablet Take 1 tablet by mouth Every Morning.      omeprazole (priLOSEC) 20 MG capsule Take 1 capsule by mouth Every Morning. Indications: Gastroesophageal Reflux Disease      polyethylene glycol (MIRALAX) 17 g packet Take 17 g by mouth  Daily.      propranolol (INDERAL) 40 MG tablet Take 1 tablet by mouth 2 (Two) Times a Day. 180 tablet 1    sulfamethoxazole-trimethoprim (Bactrim DS) 800-160 MG per tablet Take 1 tablet by mouth 2 (Two) Times a Day. Indications: Urinary Tract Infection 10 tablet 0     No facility-administered medications prior to visit.     No opioid medication identified on active medication list. I have reviewed chart for other potential  high risk medication/s and harmful drug interactions in the elderly.      Aspirin is not on active medication list.  Aspirin use is not indicated based on review of current medical condition/s. Risk of harm outweighs potential benefits.  .    Patient Active Problem List   Diagnosis    Zoraida-prosthetic fracture of femur at tip of prosthesis    Hypotension    Closed supracondylar fracture of left humerus    Allergic rhinitis    Arthritis    Benign essential hypertension    Benign paroxysmal positional vertigo    Chronic renal insufficiency    Age-related osteoporosis without current pathological fracture    Other screening mammogram    Medicare annual wellness visit, subsequent    Spinal stenosis, lumbar region, with neurogenic claudication    Dizziness    Lumbar radiculopathy    Abnormal urine    Hyperkalemia    Fatigue    Severe headache    Shortness of breath    Abnormal EKG    Generalized weakness    Electrolyte disturbance    Dyspnea    Lung nodule    Elevated TSH    Adrenal adenoma, left    Hiatal hernia    Hyponatremia    Overactive bladder    Osteoporosis    Trigger middle finger of right hand    Episodic cluster headache, not intractable    Acute pyelonephritis    Left ureteral stone    Sepsis secondary to UTI    Abnormal CT of the abdomen    Acute UTI    Abnormal CT scan, pelvis    JAI (acute kidney injury)    Bacteremia    Hospital discharge follow-up    Closed fracture of left patella    Anemia    Arthralgia of left knee    Unspecified fracture of left patella, subsequent encounter for  "closed fracture with routine healing    Constipation, unspecified     Advance Care Planning Advance Directive is on file.  ACP discussion was held with the patient during this visit. Patient has an advance directive in EMR which is still valid.             Objective   Vitals:    03/24/25 0943   BP: 130/74   BP Location: Left arm   Patient Position: Sitting   Cuff Size: Adult   Pulse: 54   Temp: 98.7 °F (37.1 °C)   SpO2: 99%   Weight: 67.5 kg (148 lb 12.8 oz)   Height: 170.2 cm (67.01\")   PainSc: 10-Worst pain ever       Estimated body mass index is 23.3 kg/m² as calculated from the following:    Height as of this encounter: 170.2 cm (67.01\").    Weight as of this encounter: 67.5 kg (148 lb 12.8 oz).    BMI is within normal parameters. No other follow-up for BMI required.           Does the patient have evidence of cognitive impairment? No                                                                                                Health  Risk Assessment    Smoking Status:  Social History     Tobacco Use   Smoking Status Never    Passive exposure: Never   Smokeless Tobacco Never   Tobacco Comments    4 cups of coffee in the AM     Alcohol Consumption:  Social History     Substance and Sexual Activity   Alcohol Use No       Fall Risk Screen  STEADI Fall Risk Assessment was completed, and patient is at LOW risk for falls.Assessment completed on:3/24/2025    Depression Screening   Little interest or pleasure in doing things? Not at all   Feeling down, depressed, or hopeless? Not at all   PHQ-2 Total Score 0      Health Habits and Functional and Cognitive Screening:      3/24/2025     9:36 AM   Functional & Cognitive Status   Do you have difficulty preparing food and eating? No   Do you have difficulty bathing yourself, getting dressed or grooming yourself? No   Do you have difficulty using the toilet? No   Do you have difficulty moving around from place to place? No   Do you have trouble with steps or getting out of a " bed or a chair? No   Current Diet Well Balanced Diet   Dental Exam Not up to date   Eye Exam Not up to date   Exercise (times per week) 0 times per week   Current Exercises Include Walking   Do you need help using the phone?  No   Are you deaf or do you have serious difficulty hearing?  No   Do you need help to go to places out of walking distance? No   Do you need help shopping? No   Do you need help preparing meals?  No   Do you need help with housework?  No   Do you need help taking your medications? No   Do you need help managing money? No   Do you ever drive or ride in a car without wearing a seat belt? No   Have you felt unusual stress, anger or loneliness in the last month? No   Who do you live with? Alone   If you need help, do you have trouble finding someone available to you? No   Have you been bothered in the last four weeks by sexual problems? No   Do you have difficulty concentrating, remembering or making decisions? No           Age-appropriate Screening Schedule:  Refer to the list below for future screening recommendations based on patient's age, sex and/or medical conditions. Orders for these recommended tests are listed in the plan section. The patient has been provided with a written plan.    Health Maintenance List  Health Maintenance   Topic Date Due    TDAP/TD VACCINES (1 - Tdap) Never done    RSV Vaccine - Adults (1 - 1-dose 75+ series) Never done    COVID-19 Vaccine (6 - 2024-25 season) 09/01/2024    LIPID PANEL  03/18/2025    ANNUAL WELLNESS VISIT  03/24/2026    DXA SCAN  06/07/2026    INFLUENZA VACCINE  Completed    Pneumococcal Vaccine 50+  Completed    ZOSTER VACCINE  Completed                                                                                                                                                CMS Preventative Services Quick Reference  Risk Factors Identified During Encounter  None Identified    The above risks/problems have been discussed with the  "patient.  Pertinent information has been shared with the patient in the After Visit Summary.  An After Visit Summary and PPPS were made available to the patient.    Follow Up:   Next Medicare Wellness visit to be scheduled in 1 year.         Additional E&M Note during same encounter follows:  Patient has additional, significant, and separately identifiable condition(s)/problem(s) that require work above and beyond the Medicare Wellness Visit     Chief Complaint  Medicare Wellness-subsequent (Has really bad headache lots of pain on her right side. Has been going for a week and a half. Has been taking two tylenol every 4 hours.)    Subjective   HPI  Keyana is also being seen today for additional medical problem/s.    Review of Systems   Constitutional:  Negative for fever.   Respiratory:  Negative for shortness of breath.         Cluster headaches- Pt having right ear pain and congestion for over a week. Pt thinks this is her cluster headache and meds helping.     Htn- doing well on meds    Back pain is stable and patient is following with pain management now. Pain is much better.      Patient was having overactive bladder symptoms and we started her on oxybutynin but she has stopped this secondary to price and it did not really help. Does not want to do anything further about it. She is still following with urology for this.          Objective   Vital Signs:  /74 (BP Location: Left arm, Patient Position: Sitting, Cuff Size: Adult)   Pulse 54   Temp 98.7 °F (37.1 °C)   Ht 170.2 cm (67.01\")   Wt 67.5 kg (148 lb 12.8 oz)   SpO2 99%   BMI 23.30 kg/m²   Physical Exam  Constitutional:       Appearance: Normal appearance. She is well-developed.   Cardiovascular:      Rate and Rhythm: Normal rate and regular rhythm.      Heart sounds: Normal heart sounds.   Pulmonary:      Effort: Pulmonary effort is normal.      Breath sounds: Normal breath sounds.   Musculoskeletal:         General: No swelling. Normal range of " motion.   Skin:     General: Skin is warm and dry.      Findings: No rash.   Neurological:      General: No focal deficit present.      Mental Status: She is alert and oriented to person, place, and time.   Psychiatric:         Mood and Affect: Mood normal.         Behavior: Behavior normal.                    Assessment and Plan      Medicare annual wellness visit, subsequent         Benign essential hypertension      Orders:    Comprehensive Metabolic Panel    Lipid Panel    Lumbar radiculopathy         Spinal stenosis, lumbar region, with neurogenic claudication         Episodic cluster headache, not intractable                           Follow Up   Return in about 6 months (around 9/24/2025) for Recheck.  Patient was given instructions and counseling regarding her condition or for health maintenance advice. Please see specific information pulled into the AVS if appropriate.    Advised to try O2 for cluster headaches.   Declines shots, cont meds, discussed risk factors.   Has already had PT for falls.

## 2025-03-25 LAB
ALBUMIN SERPL-MCNC: 4.5 G/DL (ref 3.5–5.2)
ALBUMIN/GLOB SERPL: 1.7 G/DL
ALP SERPL-CCNC: 36 U/L (ref 39–117)
ALT SERPL-CCNC: 15 U/L (ref 1–33)
AST SERPL-CCNC: 26 U/L (ref 1–32)
BILIRUB SERPL-MCNC: 0.3 MG/DL (ref 0–1.2)
BUN SERPL-MCNC: 17 MG/DL (ref 8–23)
BUN/CREAT SERPL: 18.3 (ref 7–25)
CALCIUM SERPL-MCNC: 10.2 MG/DL (ref 8.2–9.6)
CHLORIDE SERPL-SCNC: 96 MMOL/L (ref 98–107)
CHOLEST SERPL-MCNC: 245 MG/DL (ref 0–200)
CO2 SERPL-SCNC: 28 MMOL/L (ref 22–29)
CREAT SERPL-MCNC: 0.93 MG/DL (ref 0.57–1)
EGFRCR SERPLBLD CKD-EPI 2021: 58.1 ML/MIN/1.73
GLOBULIN SER CALC-MCNC: 2.7 GM/DL
GLUCOSE SERPL-MCNC: 100 MG/DL (ref 65–99)
HDLC SERPL-MCNC: 62 MG/DL (ref 40–60)
LDLC SERPL CALC-MCNC: 162 MG/DL (ref 0–100)
POTASSIUM SERPL-SCNC: 5 MMOL/L (ref 3.5–5.2)
PROT SERPL-MCNC: 7.2 G/DL (ref 6–8.5)
SODIUM SERPL-SCNC: 135 MMOL/L (ref 136–145)
TRIGL SERPL-MCNC: 121 MG/DL (ref 0–150)
VLDLC SERPL CALC-MCNC: 21 MG/DL (ref 5–40)

## 2025-03-26 ENCOUNTER — RESULTS FOLLOW-UP (OUTPATIENT)
Dept: FAMILY MEDICINE CLINIC | Facility: CLINIC | Age: OVER 89
End: 2025-03-26
Payer: MEDICARE

## 2025-03-26 NOTE — LETTER
Keyana Liz  [id number] [facility] [address]    March 27, 2025     Dear Ms. Liz:    Below are the results from your recent visit:    Resulted Orders   Comprehensive Metabolic Panel   Result Value Ref Range    Glucose 100 (H) 65 - 99 mg/dL    BUN 17 8 - 23 mg/dL    Creatinine 0.93 0.57 - 1.00 mg/dL    EGFR Result 58.1 (L) >60.0 mL/min/1.73      Comment:      GFR Categories in Chronic Kidney Disease (CKD)/X09/  /X09/  GFR Category          GFR (mL/min/1.73)    Interpretation  G1/X09/                    90 or greater/X09/        Normal or high  (1)  G2//                    60-89                Mild decrease  (1)  G3a                   45-59                Mild to moderate  decrease  G3b                   30-44                Moderate to  severe decrease  G4                    15-29                Severe decrease  G5                    14 or less           Kidney failure//  /[id number]/  (1)In the absence of evidence of kidney disease, neither  GFR category G1 or G2 fulfill the criteria for CKD.  eGFR calculation 2021 CKD-EPI creatinine equation, which  does not include race as a factor      BUN/Creatinine Ratio 18.3 7.0 - 25.0    Sodium 135 (L) 136 - 145 mmol/L    Potassium 5.0 3.5 - 5.2 mmol/L      Comment:      Slight hemolysis detected by analyzer. Result may be  falsely elevated.      Chloride 96 (L) 98 - 107 mmol/L    Total CO2 28.0 22.0 - 29.0 mmol/L    Calcium 10.2 (H) 8.2 - 9.6 mg/dL    Total Protein 7.2 6.0 - 8.5 g/dL    Albumin 4.5 3.5 - 5.2 g/dL    Globulin 2.7 gm/dL    A/G Ratio 1.7 g/dL    Total Bilirubin 0.3 0.0 - 1.2 mg/dL    Alkaline Phosphatase 36 (L) 39 - 117 U/L    AST (SGOT) 26 1 - 32 U/L      Comment:      Slight hemolysis detected by analyzer. Result may be  falsely elevated.      ALT (SGPT) 15 1 - 33 U/L   Lipid Panel   Result Value Ref Range    Total Cholesterol 245 (H) 0 - 200 mg/dL      Comment:      Cholesterol Reference Ranges  (U.S. Department of Health and  Human Services ATP III  Classifications)  Desirable          <200 mg/dL  Borderline High    200-239 mg/dL  High Risk          >240 mg/dL  Triglyceride Reference Ranges  (U.S. Department of Health and Human Services ATP III  Classifications)  Normal           <150 mg/dL  Borderline High  150-199 mg/dL  High             200-499 mg/dL  Very High        >500 mg/dL  HDL Reference Ranges  (U.S. Department of Health and Human Services ATP III  Classifications)  Low     <40 mg/dl (major risk factor for CHD)  High    >60 mg/dl ('negative' risk factor for CHD)  LDL Reference Ranges  (U.S. Department of Health and Human Services ATP III  Classifications)  Optimal          <100 mg/dL  Near Optimal     100-129 mg/dL  Borderline High  130-159 mg/dL  High             160-189 mg/dL  Very High        >189 mg/dL  LDL is calculated using the NIH LDL-C calculation.      Triglycerides 121 0 - 150 mg/dL    HDL Cholesterol 62 (H) 40 - 60 mg/dL    VLDL Cholesterol Remigio 21 5 - 40 mg/dL    LDL Chol Calc (NIH) 162 (H) 0 - 100 mg/dL       The test results show that your current treatment is working. Please {:[id number]}.   We recommend that you repeat the above test(s) in {Numbers; 1-10:15285} {Time; units w/plural:11}.    If you have any questions or concerns, please don't hesitate to call.         Sincerely,        Elizabeth Soto MD

## 2025-03-28 DIAGNOSIS — M81.0 AGE-RELATED OSTEOPOROSIS WITHOUT CURRENT PATHOLOGICAL FRACTURE: Primary | ICD-10-CM

## 2025-04-02 ENCOUNTER — INFUSION (OUTPATIENT)
Dept: ONCOLOGY | Facility: HOSPITAL | Age: OVER 89
End: 2025-04-02
Payer: MEDICARE

## 2025-04-02 DIAGNOSIS — M81.0 AGE-RELATED OSTEOPOROSIS WITHOUT CURRENT PATHOLOGICAL FRACTURE: Primary | ICD-10-CM

## 2025-04-02 PROCEDURE — 96372 THER/PROPH/DIAG INJ SC/IM: CPT

## 2025-04-02 PROCEDURE — 25010000002 DENOSUMAB 60 MG/ML SOLUTION PREFILLED SYRINGE: Performed by: FAMILY MEDICINE

## 2025-04-02 RX ADMIN — DENOSUMAB 60 MG: 60 INJECTION SUBCUTANEOUS at 10:35

## 2025-04-21 ENCOUNTER — OFFICE VISIT (OUTPATIENT)
Dept: FAMILY MEDICINE CLINIC | Facility: CLINIC | Age: OVER 89
End: 2025-04-21
Payer: MEDICARE

## 2025-04-21 VITALS
TEMPERATURE: 97.1 F | DIASTOLIC BLOOD PRESSURE: 70 MMHG | HEIGHT: 67 IN | HEART RATE: 55 BPM | OXYGEN SATURATION: 96 % | SYSTOLIC BLOOD PRESSURE: 140 MMHG | BODY MASS INDEX: 23.1 KG/M2 | WEIGHT: 147.2 LBS

## 2025-04-21 DIAGNOSIS — R07.89 CHEST TIGHTNESS: ICD-10-CM

## 2025-04-21 DIAGNOSIS — G44.019 EPISODIC CLUSTER HEADACHE, NOT INTRACTABLE: ICD-10-CM

## 2025-04-21 DIAGNOSIS — R51.9 NONINTRACTABLE HEADACHE, UNSPECIFIED CHRONICITY PATTERN, UNSPECIFIED HEADACHE TYPE: ICD-10-CM

## 2025-04-21 DIAGNOSIS — R53.83 OTHER FATIGUE: ICD-10-CM

## 2025-04-21 DIAGNOSIS — R05.9 COUGH, UNSPECIFIED TYPE: Primary | ICD-10-CM

## 2025-04-21 LAB
EXPIRATION DATE: NORMAL
FLUAV AG UPPER RESP QL IA.RAPID: NOT DETECTED
FLUBV AG UPPER RESP QL IA.RAPID: NOT DETECTED
INTERNAL CONTROL: NORMAL
Lab: NORMAL
SARS-COV-2 AG UPPER RESP QL IA.RAPID: NOT DETECTED

## 2025-04-21 PROCEDURE — 1159F MED LIST DOCD IN RCRD: CPT | Performed by: NURSE PRACTITIONER

## 2025-04-21 PROCEDURE — 99214 OFFICE O/P EST MOD 30 MIN: CPT | Performed by: NURSE PRACTITIONER

## 2025-04-21 PROCEDURE — 87428 SARSCOV & INF VIR A&B AG IA: CPT | Performed by: NURSE PRACTITIONER

## 2025-04-21 PROCEDURE — 1126F AMNT PAIN NOTED NONE PRSNT: CPT | Performed by: NURSE PRACTITIONER

## 2025-04-21 PROCEDURE — 1160F RVW MEDS BY RX/DR IN RCRD: CPT | Performed by: NURSE PRACTITIONER

## 2025-04-21 RX ORDER — CARBAMAZEPINE 100 MG/1
TABLET, CHEWABLE ORAL
Qty: 30 TABLET | Refills: 0 | Status: SHIPPED | OUTPATIENT
Start: 2025-04-21 | End: 2025-04-21

## 2025-04-21 RX ORDER — CARBAMAZEPINE 100 MG/1
TABLET, CHEWABLE ORAL
Qty: 90 TABLET | Refills: 0 | Status: SHIPPED | OUTPATIENT
Start: 2025-04-21

## 2025-04-21 RX ORDER — AMOXICILLIN 500 MG/1
500 CAPSULE ORAL 3 TIMES DAILY
Qty: 21 CAPSULE | Refills: 0 | Status: SHIPPED | OUTPATIENT
Start: 2025-04-21

## 2025-04-21 RX ORDER — DEXTROMETHORPHAN POLISTIREX 30 MG/5ML
60 SUSPENSION ORAL EVERY 12 HOURS SCHEDULED
Qty: 280 ML | Refills: 0 | Status: SHIPPED | OUTPATIENT
Start: 2025-04-21

## 2025-04-21 NOTE — PROGRESS NOTES
"Chief Complaint  Headache (Intermittenly into left ear with pain radiating into neck all symptoms for the past few days), Fatigue (Feels weak, lightheaded), and Cough    Subjective        Keyana Liz presents to [facility]  History of Present Illness  Patient is here to discuss headache. Her neighbor is here with patient. She is also coughing which started around thursday, and having some chest tightness. She does have a history of cluster headaches and had one on the right side of head one month ago, but has developed pain on the left side. This pain is different then her normal headache. It is radiating down her left neck. The tegretol she normally takes for her cluster headaches has helped some for the headache on the left but not as well as it usually does. She noticed the cough is dry and started about one week ago. Denies fever. She also feels significantly fatigued and weak, that is not normally an issue with her cluster headaches. She has been taking a previous cough syrup prescription.   Objective   Vital Signs:  /70 (BP Location: Right arm, Patient Position: Sitting, Cuff Size: Adult)   Pulse 55   Temp 97.1 °F (36.2 °C) (Temporal)   Ht 170.2 cm (67\")   Wt 66.8 kg (147 lb 3.2 oz)   SpO2 96%   BMI 23.05 kg/m²   Estimated body mass index is 23.05 kg/m² as calculated from the following:    Height as of this encounter: 170.2 cm (67\").    Weight as of this encounter: 66.8 kg (147 lb 3.2 oz).    BMI is within normal parameters. No other follow-up for BMI required.      Physical Exam  Constitutional:       Appearance: Normal appearance.   HENT:      Head: Normocephalic.      Nose: Congestion present.   Eyes:      Extraocular Movements: Extraocular movements intact.      Pupils: Pupils are equal, round, and reactive to light.   Cardiovascular:      Rate and Rhythm: Normal rate and regular rhythm.      Heart sounds: Normal heart sounds.   Pulmonary:      Effort: " Pulmonary effort is normal.      Breath sounds: Examination of the right-lower field reveals decreased breath sounds. Examination of the left-lower field reveals decreased breath sounds.   Musculoskeletal:         General: Normal range of motion.      Cervical back: Normal range of motion.   Skin:     General: Skin is warm and dry.   Neurological:      General: No focal deficit present.      Mental Status: She is alert and oriented to person, place, and time.      Motor: Motor function is intact. No weakness.      Gait: Gait abnormal.      Comments: Using walker to ambulate; no noticeable unilateral or bilateral weakness of any extremity. No slurred speech or facial droop.    Psychiatric:         Mood and Affect: Mood normal.        Result Review :             No acute abnormalities on chest xray; will await radiology final report.      Assessment and Plan   Diagnoses and all orders for this visit:    1. Cough, unspecified type (Primary)  -     POCT SARS-CoV-2 Antigen LEONARDO + Flu  -     XR Chest PA & Lateral (In Office)  -     amoxicillin (AMOXIL) 500 MG capsule; Take 1 capsule by mouth 3 (Three) Times a Day.  Dispense: 21 capsule; Refill: 0  -     dextromethorphan polistirex ER (DELSYM) 30 MG/5ML Suspension Extended Release oral suspension; Take 10 mL by mouth Every 12 (Twelve) Hours.  Dispense: 280 mL; Refill: 0    2. Other fatigue  -     CT Head Without Contrast; Future    3. Nonintractable headache, unspecified chronicity pattern, unspecified headache type  -     CT Head Without Contrast; Future    4. Chest tightness             Follow Up   Return if symptoms worsen or fail to improve.  Patient was given instructions and counseling regarding her condition or for health maintenance advice. Please see specific information pulled into the AVS if appropriate.

## 2025-04-22 ENCOUNTER — RESULTS FOLLOW-UP (OUTPATIENT)
Dept: FAMILY MEDICINE CLINIC | Facility: CLINIC | Age: OVER 89
End: 2025-04-22
Payer: MEDICARE

## 2025-04-22 ENCOUNTER — HOSPITAL ENCOUNTER (OUTPATIENT)
Dept: CT IMAGING | Facility: HOSPITAL | Age: OVER 89
Discharge: HOME | End: 2025-04-22
Admitting: NURSE PRACTITIONER
Payer: MEDICARE

## 2025-04-22 DIAGNOSIS — R53.83 OTHER FATIGUE: ICD-10-CM

## 2025-04-22 DIAGNOSIS — R51.9 NONINTRACTABLE HEADACHE, UNSPECIFIED CHRONICITY PATTERN, UNSPECIFIED HEADACHE TYPE: ICD-10-CM

## 2025-04-22 PROCEDURE — 70450 CT HEAD/BRAIN W/O DYE: CPT

## 2025-04-22 NOTE — TELEPHONE ENCOUNTER
PATIENT CALLED BACK . SHARED INFO    SHE UNDERSTOOD    SHE STATES SHE WENT TO [facility] FOR CT SCAN AND HER RESULTS MAY BE READY TODAY. IF OFFICE CAN CALL WITH RESULTS.  SHE IS NOT ANY BETTER AND STILL TAKING HER MEDICATION    CALL BACK [phone]

## 2025-04-22 NOTE — TELEPHONE ENCOUNTER
LMTCB    **HUB/FO** MAY RELAY MESSAGE        Let patient know her chest xray was good, no indication of pneumonia or other acute illness.

## 2025-04-28 ENCOUNTER — OFFICE VISIT (OUTPATIENT)
Dept: FAMILY MEDICINE CLINIC | Facility: CLINIC | Age: OVER 89
End: 2025-04-28
Payer: MEDICARE

## 2025-04-28 VITALS
DIASTOLIC BLOOD PRESSURE: 70 MMHG | SYSTOLIC BLOOD PRESSURE: 130 MMHG | HEART RATE: 56 BPM | RESPIRATION RATE: 14 BRPM | OXYGEN SATURATION: 97 % | BODY MASS INDEX: 23.2 KG/M2 | TEMPERATURE: 97.9 F | HEIGHT: 67 IN | WEIGHT: 147.8 LBS

## 2025-04-28 DIAGNOSIS — J40 BRONCHITIS: Primary | ICD-10-CM

## 2025-04-28 DIAGNOSIS — R06.02 SHORTNESS OF BREATH: ICD-10-CM

## 2025-04-28 RX ORDER — ALBUTEROL SULFATE 90 UG/1
2 INHALANT RESPIRATORY (INHALATION) EVERY 4 HOURS PRN
Qty: 18 G | Refills: 3 | Status: SHIPPED | OUTPATIENT
Start: 2025-04-28

## 2025-04-28 RX ORDER — PREDNISONE 5 MG/1
TABLET ORAL
Qty: 21 TABLET | Refills: 0 | Status: SHIPPED | OUTPATIENT
Start: 2025-04-28

## 2025-04-28 RX ORDER — AZITHROMYCIN 250 MG/1
TABLET, FILM COATED ORAL
Qty: 6 TABLET | Refills: 0 | Status: SHIPPED | OUTPATIENT
Start: 2025-04-28

## 2025-04-28 NOTE — PROGRESS NOTES
"Chief Complaint  Shortness of Breath, Cough, and Fatigue    Subjective        Keyana Liz presents to [facility]  Shortness of Breath    Cough  Associated symptoms include shortness of breath.   Fatigue  Symptoms include cough and fatigue.      Pt presents today for a week long hx of cough, congestion, sinus pain and some SOB and fatigue.  No wheezing.  No fever or chills.  No body aches.  She has taken abx.  She was ginve amoxil last week but didn't help.  Coughing up phlegm.  She is not on any inhalers.    Objective   Vital Signs:  /70 (BP Location: Right arm, Patient Position: Sitting)   Pulse 56   Temp 97.9 °F (36.6 °C)   Resp 14   Ht 170.2 cm (67\")   Wt 67 kg (147 lb 12.8 oz)   SpO2 97%   BMI 23.15 kg/m²   Estimated body mass index is 23.15 kg/m² as calculated from the following:    Height as of this encounter: 170.2 cm (67\").    Weight as of this encounter: 67 kg (147 lb 12.8 oz).    BMI is within normal parameters. No other follow-up for BMI required.      Physical Exam  Vitals and nursing note reviewed.   Constitutional:       Appearance: Normal appearance. She is well-developed. She is not ill-appearing.   HENT:      Head: Normocephalic and atraumatic.      Nose: Nose normal. No congestion.      Mouth/Throat:      Mouth: Mucous membranes are moist.      Pharynx: Oropharynx is clear. No oropharyngeal exudate or posterior oropharyngeal erythema.   Eyes:      General: No scleral icterus.        Right eye: No discharge.         Left eye: No discharge.      Extraocular Movements: Extraocular movements intact.      Conjunctiva/sclera: Conjunctivae normal.      Pupils: Pupils are equal, round, and reactive to light.   Cardiovascular:      Rate and Rhythm: Normal rate and regular rhythm.      Heart sounds: Normal heart sounds. No murmur heard.  Pulmonary:      Effort: Pulmonary effort is normal. No respiratory distress.      Breath sounds: Wheezing present.      " Comments: Course BS  Musculoskeletal:      Cervical back: Normal range of motion and neck supple. No rigidity or tenderness.   Lymphadenopathy:      Cervical: No cervical adenopathy.   Neurological:      General: No focal deficit present.      Mental Status: She is alert and oriented to person, place, and time. She is not disoriented.   Psychiatric:         Mood and Affect: Mood normal.         Behavior: Behavior normal.        Result Review :                Assessment and Plan   Diagnoses and all orders for this visit:    1. Bronchitis (Primary)  -     CBC & Differential  -     azithromycin (Zithromax) 250 MG tablet; Take 2 tablets the first day, then 1 tablet daily for 4 days.  Dispense: 6 tablet; Refill: 0  -     albuterol sulfate  (90 Base) MCG/ACT inhaler; Inhale 2 puffs Every 4 (Four) Hours As Needed for Wheezing.  Dispense: 18 g; Refill: 3  -     predniSONE (DELTASONE) 5 MG tablet; 6 pills day 1 5 pills day 2 4 pills day 3 3 pills day 4 2 pills day 5 1 pill day 6  Dispense: 21 tablet; Refill: 0    2. Shortness of breath  -     CBC & Differential  -     azithromycin (Zithromax) 250 MG tablet; Take 2 tablets the first day, then 1 tablet daily for 4 days.  Dispense: 6 tablet; Refill: 0  -     albuterol sulfate  (90 Base) MCG/ACT inhaler; Inhale 2 puffs Every 4 (Four) Hours As Needed for Wheezing.  Dispense: 18 g; Refill: 3  -     predniSONE (DELTASONE) 5 MG tablet; 6 pills day 1 5 pills day 2 4 pills day 3 3 pills day 4 2 pills day 5 1 pill day 6  Dispense: 21 tablet; Refill: 0             Follow Up   No follow-ups on file.  Patient was given instructions and counseling regarding her condition or for health maintenance advice. Please see specific information pulled into the AVS if appropriate.         Symptomatic treatment and otc meds and fluids and rest.  Follow up if no better.    I have reviewed last note and xray   Recommend mwe 3/25/26  Start dose chirag, zpak and inhaler.  Will get labs today.

## 2025-04-29 ENCOUNTER — RESULTS FOLLOW-UP (OUTPATIENT)
Dept: FAMILY MEDICINE CLINIC | Facility: CLINIC | Age: OVER 89
End: 2025-04-29
Payer: MEDICARE

## 2025-04-29 LAB
BASOPHILS # BLD AUTO: 0.05 10*3/MM3 (ref 0–0.2)
BASOPHILS NFR BLD AUTO: 0.6 % (ref 0–1.5)
EOSINOPHIL # BLD AUTO: 0.15 10*3/MM3 (ref 0–0.4)
EOSINOPHIL NFR BLD AUTO: 1.7 % (ref 0.3–6.2)
ERYTHROCYTE [DISTWIDTH] IN BLOOD BY AUTOMATED COUNT: 13 % (ref 12.3–15.4)
HCT VFR BLD AUTO: 38 % (ref 34–46.6)
HGB BLD-MCNC: 12.2 G/DL (ref 12–15.9)
IMM GRANULOCYTES # BLD AUTO: 0.03 10*3/MM3 (ref 0–0.05)
IMM GRANULOCYTES NFR BLD AUTO: 0.3 % (ref 0–0.5)
LYMPHOCYTES # BLD AUTO: 2.52 10*3/MM3 (ref 0.7–3.1)
LYMPHOCYTES NFR BLD AUTO: 28 % (ref 19.6–45.3)
MCH RBC QN AUTO: 28.8 PG (ref 26.6–33)
MCHC RBC AUTO-ENTMCNC: 32.1 G/DL (ref 31.5–35.7)
MCV RBC AUTO: 89.8 FL (ref 79–97)
MONOCYTES # BLD AUTO: 0.61 10*3/MM3 (ref 0.1–0.9)
MONOCYTES NFR BLD AUTO: 6.8 % (ref 5–12)
NEUTROPHILS # BLD AUTO: 5.65 10*3/MM3 (ref 1.7–7)
NEUTROPHILS NFR BLD AUTO: 62.6 % (ref 42.7–76)
NRBC BLD AUTO-RTO: 0 /100 WBC (ref 0–0.2)
PLATELET # BLD AUTO: 359 10*3/MM3 (ref 140–450)
RBC # BLD AUTO: 4.23 10*6/MM3 (ref 3.77–5.28)
WBC # BLD AUTO: 9.01 10*3/MM3 (ref 3.4–10.8)

## 2025-05-03 DIAGNOSIS — I10 BENIGN ESSENTIAL HYPERTENSION: ICD-10-CM

## 2025-05-05 RX ORDER — PROPRANOLOL HYDROCHLORIDE 40 MG/1
40 TABLET ORAL 2 TIMES DAILY
Qty: 180 TABLET | Refills: 1 | Status: SHIPPED | OUTPATIENT
Start: 2025-05-05

## 2025-05-05 NOTE — TELEPHONE ENCOUNTER
LOV                   4/28/25  NOV                  9/22/25  Last refill             9/16/24  Protocol              met

## 2025-05-19 ENCOUNTER — HOSPITAL ENCOUNTER (EMERGENCY)
Facility: HOSPITAL | Age: OVER 89
Discharge: HOME | End: 2025-05-19
Attending: EMERGENCY MEDICINE | Admitting: EMERGENCY MEDICINE
Payer: MEDICARE

## 2025-05-19 ENCOUNTER — APPOINTMENT (OUTPATIENT)
Dept: CT IMAGING | Facility: HOSPITAL | Age: OVER 89
End: 2025-05-19
Payer: MEDICARE

## 2025-05-19 VITALS
SYSTOLIC BLOOD PRESSURE: 153 MMHG | HEART RATE: 66 BPM | BODY MASS INDEX: 22.29 KG/M2 | OXYGEN SATURATION: 97 % | TEMPERATURE: 98.1 F | WEIGHT: 142 LBS | RESPIRATION RATE: 18 BRPM | HEIGHT: 67 IN | DIASTOLIC BLOOD PRESSURE: 63 MMHG

## 2025-05-19 DIAGNOSIS — S70.02XA CONTUSION OF LEFT HIP, INITIAL ENCOUNTER: ICD-10-CM
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
## 2025-05-19 PROCEDURE — 99284 EMERGENCY DEPT VISIT MOD MDM: CPT

## 2025-05-19 PROCEDURE — 72192 CT PELVIS W/O DYE: CPT

## 2025-05-19 NOTE — DISCHARGE INSTRUCTIONS
Continue with use of your walker.  While you are injuring and favoring the left leg it will be more important than ever.  I would ice the area 3-4 times a day to help with pain and swelling.  Tylenol per bottle instructions for pain.  Also apply the diclofenac gel 3 times a day for pain.    Your symptoms persist beyond a week's time and follow-up with Dr. Firedman your surgeon for repeat evaluation.  Return to the ER with any worsening pain, or should you develop any new symptoms we did not address at this visit.

## 2025-05-19 NOTE — FSED PROVIDER NOTE
EMERGENCY DEPARTMENT ENCOUNTER    Room Number:  01/01  Date seen:  5/19/2025  Time seen: 13:30 EDT  PCP: Elizabeth Soto MD  Historian: Patient    Discussed/obtained information from independent historians: N/A    HPI:  Chief complaint: Mechanical fall, left hip pain  A complete HPI/ROS/PMH/PSH/SH/FH are unobtainable due to: Nothing  Context:Keyana Liz is a 89 y.o. female who presents to the ED with c/o mechanical fall.  Patient reports she was using her walker and going to the refrigerator.  She let go of the walker to open the refrigerator lost balance and fell onto the right hip.  She denies hitting her head in the process.  Life alert did not go off.  She was able to get to her feet and seek help.  She was brought to the ED for further evaluation.  She states as long as she is not bearing weight the pain is tolerable.  She has moderate pain with bearing weight but is able to bear weight.  She denies any other pain or or sustaining any other injury at this time.      External (non-ED) record review: Followed by Dr. MICHELLE Healy in the primary care setting.  Last seen for shortness of breath cough and fatigue on 4/28/2025.  Patient was diagnosed with acute bronchitis and placed on a Z-Suman, albuterol, 6-day prednisone taper.  She was to follow-up as needed.    Chronic or social conditions impacting care:    ALLERGIES  Cvs diuretic maximum strength [pamabrom]    PAST MEDICAL HISTORY  Active Ambulatory Problems     Diagnosis Date Noted    Zoraida-prosthetic fracture of femur at tip of prosthesis 03/14/2019    Hypotension 03/14/2019    Closed supracondylar fracture of left humerus 03/14/2019    Allergic rhinitis     Arthritis     Benign essential hypertension     Benign paroxysmal positional vertigo     Chronic renal insufficiency     Age-related osteoporosis without current pathological fracture     Other screening mammogram     Medicare annual wellness visit, subsequent 12/11/2019    Spinal stenosis,  lumbar region, with neurogenic claudication 01/29/2021    Dizziness 07/11/2022    Lumbar radiculopathy 07/11/2022    Abnormal urine 07/27/2022    Hyperkalemia 07/27/2022    Fatigue 07/27/2022    Severe headache 07/27/2022    Shortness of breath 07/27/2022    Abnormal EKG 07/27/2022    Generalized weakness 07/27/2022    Electrolyte disturbance 07/29/2022    Dyspnea 07/29/2022    Lung nodule 08/04/2022    Elevated TSH 08/04/2022    Adrenal adenoma, left 08/04/2022    Hiatal hernia 08/04/2022    Hyponatremia 08/04/2022    Overactive bladder 03/01/2023    Osteoporosis 03/08/2023    Trigger middle finger of right hand 07/03/2023    Episodic cluster headache, not intractable 07/03/2023    Acute pyelonephritis 10/20/2024    Left ureteral stone 10/20/2024    Sepsis secondary to UTI 10/20/2024    Abnormal CT of the abdomen 10/20/2024    Acute UTI 10/21/2024    Abnormal CT scan, pelvis 11/13/2024    JAI (acute kidney injury) 12/21/2024    Bacteremia 12/22/2024    Hospital discharge follow-up 01/25/2025    Closed fracture of left patella 01/09/2025    Anemia 12/25/2024    Arthralgia of left knee 01/09/2025    Unspecified fracture of left patella, subsequent encounter for closed fracture with routine healing 12/25/2024    Constipation, unspecified 12/25/2024    Bronchitis 04/28/2025     Resolved Ambulatory Problems     Diagnosis Date Noted    Leukocytosis 03/14/2019    Acute post-hemorrhagic anemia 03/15/2019    JAI (acute kidney injury) 03/15/2019    Postmenopausal HRT (hormone replacement therapy)     Lumbar radiculitis 12/01/2021    Lumbar stenosis with neurogenic claudication 12/01/2021    Primary hypertension 07/11/2022     Past Medical History:   Diagnosis Date    Acute bronchitis     Analgesic overuse headache     Cancer     Cluster headache     Constipation     Cough     Dehydration     Dysuria     Fall at home     Hypertension     Hypertension, uncontrolled     Hypertensive urgency     Increased frequency of urination      Kidney stones     Otitis media     Short-term memory loss     Shoulder pain, left     Sleep apnea     TACS (trigeminal autonomic cephalgias)     Urinary frequency     UTI symptoms     Vertigo     Vitamin B12 deficiency        PAST SURGICAL HISTORY  Past Surgical History:   Procedure Laterality Date    CYSTOSCOPY W/ URETERAL STENT PLACEMENT N/A 10/20/2024    Procedure: CYSTOSCOPY URETERAL CATHETER/STENT INSERTION;  Surgeon: Maverick Park MD;  Location: [facility] OR;  Service: Urology;  Laterality: N/A;    EXTRACORPOREAL SHOCK WAVE LITHOTRIPSY (ESWL) Left 11/4/2024    Procedure: LEFT SHOCKWAVE LITHOTRIPSY;  Surgeon: Georgi Patel MD;  Location: [facility] MAIN OR;  Service: Urology;  Laterality: Left;    EXTRACORPOREAL SHOCK WAVE LITHOTRIPSY (ESWL) Left 11/20/2024    Procedure: LEFT URETERAL  SHOCKWAVE LITHOTRIPSY;  Surgeon: Maverick Park MD;  Location: [facility] OR;  Service: Urology;  Laterality: Left;    FEMUR FRACTURE SURGERY Left     FEMUR IM NAILING/RODDING Left 3/16/2019    Procedure: Fixation of periprosthetic femur fracture;  Surgeon: Lauro Friedman MD;  Location: [facility] MAIN OR;  Service: Orthopedics    LUMBAR EPIDURAL INJECTION N/A 10/27/2021    Procedure: LUMBAR EPIDURAL 1ST VISIT Lumbar 5-sacral 1;  Surgeon: Brittani Fleming MD;  [place] [facility] [place];  Service: Pain Management;  Laterality: N/A;    LUMBAR EPIDURAL INJECTION N/A 12/13/2021    Procedure: lumbar epidural steroid injection;  Surgeon: Brittani Fleming MD;  [place] [facility] [place];  Service: Pain Management;  Laterality: N/A;    MASTECTOMY      URETEROSCOPY LASER LITHOTRIPSY WITH STENT INSERTION Left 12/19/2024    Procedure: LEFT URETEROSCOPY LASER LITHOTRIPSY WITH STENT REMOVAL AND REPLACEMENT;  Surgeon: Maverick Park MD;  Location: [facility] MAIN OR;  Service: Urology;  Laterality: Left;       FAMILY HISTORY  Family History   Problem Relation Age of Onset    No Known Problems Mother     No Known Problems  Father     Malig Hyperthermia Neg Hx        SOCIAL HISTORY  Social History     Socioeconomic History    Marital status:    Tobacco Use    Smoking status: Never     Passive exposure: Never    Smokeless tobacco: Never    Tobacco comments:     4 cups of coffee in the AM   Vaping Use    Vaping status: Never Used   Substance and Sexual Activity    Alcohol use: No    Drug use: No    Sexual activity: Defer       REVIEW OF SYSTEMS  Review of Systems    All systems reviewed and negative except for those discussed in HPI.     PHYSICAL EXAM    I have reviewed the triage vital signs and nursing notes.  Vitals:    05/19/25 1330   BP: 153/63   Pulse: 66   Resp: 18   Temp:    SpO2: 97%     Physical Exam    GENERAL: Very pleasant, well-appearing, not distressed  HENT: nares patent  EYES: no scleral icterus  NECK: no ROM limitations  CV: regular rhythm, regular rate  RESPIRATORY: normal effort  ABDOMEN: soft  : deferred  MUSCULOSKELETAL: TTP posterior left hip and over the buttock.  No obvious hematoma.  The left leg is not shortened and externally rotated.  Compartments of both lower extremities are soft, DP and PT pulses +2 bilaterally.  NEURO: alert, moves all extremities, follows commands  SKIN: warm, dry    LAB RESULTS  No results found for this or any previous visit (from the past 24 hours).    Ordered the above labs and independently interpreted results.  My findings will be discussed in the ED course or medical decision making section below    RADIOLOGY RESULTS  CT Pelvis Without Contrast  Result Date: 5/19/2025  CT PELVIS WO CONTRAST-  HISTORY: Fall with left hip pain.  TECHNIQUE: Radiation dose reduction techniques were utilized, including automated exposure control and exposure modulation based on body size. 1 mm images were obtained through the pelvis without the administration of IV contrast.  COMPARISON: CT pelvis 12/22/2024. Left femur radiographs 3/14/2019.   FINDINGS: Partially visualized left femoral  intramedullary nicole with dynamic interlocking screw transfixing a healed prior intertrochanteric left femur fracture. Partially visualized left lateral femoral plate fixation with cerclage wires. Visualized portions of the hardware are intact on  radiograph where seen. No CT evidence of an acute pelvic or new proximal femur fracture. Normal bilateral hip alignment. Mild bilateral hip and pubic symphysis osteoarthritis. Transitional lumbar vertebral anatomy. Sigmoid colon diverticulosis. Remaining solid organs and bowel are normal in limited noncontrast CT appearance. Appendix he is not definitively identified. No secondary findings to suggest appendicitis. No pelvic lymphadenopathy.        No CT evidence of an acute pelvic or proximal femur fracture. Partially visualized left femoral intramedullary nicole with dynamic interlocking screw transfixing a healed prior intertrochanteric left femur fracture. Partially visualized left femoral plate and screw fixation with cerclage wires. Visualized portions of the hardware are intact where seen.  This report was finalized on 5/19/2025 2:25 PM by Dr. Gus Corrales M.D on Workstation: [id number]         Ordered the above noted radiological studies.  Independently interpreted by me.  My findings will be discussed in the medical decision section below.     PROGRESS, DATA ANALYSIS, CONSULTS AND MEDICAL DECISION MAKING    Please note that this section constitutes my independent interpretation of clinical data including lab results, radiology, EKG's.  This constitutes my independent professional opinion regarding differential diagnosis and management of this patient.  It may include any factors such as history from outside sources, review of external records, social determinants of health, management of medications, response to those treatments, and discussions with other providers.    ED Course as of 05/19/25 1511   Mon May 19, 2025   1440   IMPRESSION:  No CT evidence of an  acute pelvic or proximal femur  fracture. Partially visualized left femoral intramedullary nicole with  dynamic interlocking screw transfixing a healed prior intertrochanteric  left femur fracture. Partially visualized left femoral plate and screw  fixation with cerclage wires. Visualized portions of the hardware are  intact where seen.     This report was finalized on 5/19/2025 2:25 PM by Dr. Gus Corrales M.D on Workstation: [id number]   [RC]   1506 Patient ambulates with a walker well without difficulty.  She made a complete lap around the emergency department and did not favor the left hip at all while ambulating.  Doubt fracture.  Patient well-appearing and appears safe for discharge home.  If her symptoms persist beyond a week we will have her to follow-up with Ortho or primary care for repeat evaluation.  Will have her return to the ER with worsening symptoms or should she have any further concerns. [RC]      ED Course User Index  [RC] Sha Neal III, PA     Orders placed during this visit:  Orders Placed This Encounter   Procedures    CT Pelvis Without Contrast    Ambulate patient    ED Acknowledgement Form Needed;            Medical Decision Making  Amount and/or Complexity of Data Reviewed  Radiology: ordered.      DDx: Left hip contusion, femoral neck fracture, intertrochanteric fracture, acetabular fracture, sacral ala fracture, other pelvic fracture.  Will obtain a CT scan without contrast of the pelvis to further evaluate.  Patient is declining Tylenol or anything for pain at this time.    3:06 PM.  Patient ambulates without any difficulty or signs of imbalance with a walker.  She safe for discharge.  See ED course for the remainder of the plan and MDM process.  It looks like patient is followed by Dr. Lauro Friedman who is performed left hip surgery in the past on the patient.  Last surgery on record was 3/16/2019.  Will have her follow-up with orthopedics for further evaluation weeks time  if symptoms persist.      DIAGNOSIS  Final diagnoses:   Fall, initial encounter   Contusion of left hip, initial encounter          Medication List        New Prescriptions      Diclofenac Sodium 1 % gel gel  Commonly known as: VOLTAREN  Apply 4 g topically to the appropriate area as directed 3 (Three) Times a Day. As needed for pain            Stop      amoxicillin 500 MG capsule  Commonly known as: AMOXIL     azithromycin 250 MG tablet  Commonly known as: Zithromax     dextromethorphan polistirex ER 30 MG/5ML Suspension Extended Release oral suspension  Commonly known as: DELSYM     predniSONE 5 MG tablet  Commonly known as: DELTASONE     sulfamethoxazole-trimethoprim 800-160 MG per tablet  Commonly known as: Bactrim DS               Where to Get Your Medications        These medications were sent to [id number] - [place], [place] - [address] [facility] - [phone]  - [phone] FX  [address] [facility] [address]      Phone: [phone]   Diclofenac Sodium 1 % gel gel         FOLLOW-UP  Lauro Friedman MD  [address]  [facility] [address]  [phone]    In 1 week  For further evaluation and treatment, As needed    Elizabeth Soto MD  [address] [facility] [address]  [facility] [id number] [phone]      For all other issues in interim management        Latest Documented Vital Signs:  As of 15:11 EDT  BP- 153/63 HR- 66 Temp- 98.1 °F (36.7 °C) (Oral) O2 sat- 97%    Appropriate PPE utilized throughout this patient encounter to include mask, if indicated, per current protocol. Hand hygiene was performed before donning PPE and after removal when leaving the room.    Please note that portions of this were completed with a voice recognition program.     Note Disclaimer: At [facility], we believe that sharing information builds trust and better relationships. You are receiving this note because you are receiving care at [facility] [place], operated by [facility] or recently visited. It is possible you will see health information before a provider has talked with you about it. This kind of information can be easy to misunderstand. To help you fully understand what it means for your health, we urge you to discuss this note with your provider.

## 2025-05-22 ENCOUNTER — PATIENT OUTREACH (OUTPATIENT)
Dept: CASE MANAGEMENT | Facility: OTHER | Age: OVER 89
End: 2025-05-22
Payer: MEDICARE

## 2025-05-22 NOTE — OUTREACH NOTE
AMBULATORY CASE MANAGEMENT NOTE    Names and Relationships of Patient/Support Persons: Contact: Keyana Liz; Relationship: Self -     CCM Interim Update    Called and spoke to patient for CCM services. Introduced self, role and reason for the call. Patient recently in the ER due to fall. CT completed and no fracture. Patient reported always using walker, the time she fell she was about to prepare breakfast holding a donut on one hand and the refrigerator in the other and got of balance. She has a life alert, it did not detect the fall. She called a neighbor to assist. Encouraged to contact her life alert company to ensure it is working. Patient states she is able to walk and bear weight. Not much pain but bruised up and still sore. Encouraged to call PCP office if symptoms gets worse.    Patient lives alone, family lives about an hour away. She reports being independent at home and able to drive to groceries and appointments and if not she verbalizes having very supportive [employer] group.     Patient recently seen in office for bronchitis. She states it took 3 weeks until she improved. She denies any symptoms now and breathing well. Following Urology for overactive bladder. She verbalizes issue with her medication and she is working on this with Urology. Scheduled to see Eye doctor today. ACM will plan for follow up outreach.     Education Documentation  No documentation found.        Cindy LOPEZ  Ambulatory Case Management    5/22/2025, 09:57 EDT

## 2025-06-09 ENCOUNTER — HOSPITAL ENCOUNTER (OUTPATIENT)
Dept: CT IMAGING | Facility: HOSPITAL | Age: OVER 89
Discharge: HOME | End: 2025-06-09
Admitting: INTERNAL MEDICINE
Payer: MEDICARE

## 2025-06-09 DIAGNOSIS — R91.8 LUNG NODULES: ICD-10-CM

## 2025-06-09 PROCEDURE — 71250 CT THORAX DX C-: CPT

## 2025-06-20 ENCOUNTER — PATIENT OUTREACH (OUTPATIENT)
Dept: CASE MANAGEMENT | Facility: OTHER | Age: OVER 89
End: 2025-06-20
Payer: MEDICARE

## 2025-06-20 NOTE — OUTREACH NOTE
AMBULATORY CASE MANAGEMENT NOTE    Names and Relationships of Patient/Support Persons: Contact: Agusto Keyana PREETI; Relationship: Self -     Patient Outreach    Called and spoke to patient for follow up outreach. Patient states she is doing well although easily gets worn out when doing house chores. Encouraged patient to rest in between activities and to stay well hydrated. She denies any recent falls, continues to use walker. Patient following up with providers appropriately. Recently seen her eye doctor. She reported that she still did not get her medication for her overactive bladder and she just decided to give up on this, unknown which pharmacy prescription was sent to. Will follow up with urology on this. Patient states she is scheduled to follow up with Urology in October. Patient states she is eating well and still able to prepare food for herself. Recent BP at normal range. ACM will assist.     Care Coordination    Attempted to call Urology office to coordinate medication. Staff took message and states office will call back.     Education Documentation  No documentation found.        Cindy LOPEZ  Ambulatory Case Management    6/20/2025, 13:01 EDT

## 2025-06-30 ENCOUNTER — PATIENT OUTREACH (OUTPATIENT)
Dept: CASE MANAGEMENT | Facility: OTHER | Age: OVER 89
End: 2025-06-30
Payer: MEDICARE

## 2025-06-30 NOTE — OUTREACH NOTE
AMBULATORY CASE MANAGEMENT NOTE    Names and Relationships of Patient/Support Persons: Contact: Keyana Liz; Relationship: Self -     Patient Outreach    Called and spoke to patient for follow up update. She states her Urology office reached out to her regarding our inquiry for one of her medication that she was prescribed but never received for her overactive bladder. Also, an eye drop medication from her eye doctor she never received. She believes both of these were suppose to be mailed as she was told she can get them cheaper if mailed. Patient states she just gives up and would just discuss these medications on her upcoming eye and urology appointment. She states so far she is doing well today. She denies urgent needs. ACM  will plan will plan follow up, if doing well and no further needs can close HRCM.     Education Documentation  No documentation found.        Cindy LOPEZ  Ambulatory Case Management    6/30/2025, 15:00 EDT

## (undated) DEVICE — MAT FLR ABSORBENT LG 4FT 10 2.5FT

## (undated) DEVICE — PK URETSCP 40

## (undated) DEVICE — GLV SURG TRIUMPH PF LTX 7 STRL

## (undated) DEVICE — HANDPIECE SET WITH COAXIAL HIGH FLOW TIP AND SUCTION TUBE: Brand: INTERPULSE

## (undated) DEVICE — FIBR LASR FLEXIVAPULSE242 HOLMIUM FLT/TP 1P/U

## (undated) DEVICE — Device: Brand: PORTEX

## (undated) DEVICE — NITINOL WIRE WITH HYDROPHILIC TIP: Brand: SENSOR

## (undated) DEVICE — IMMOB KN 3PNL DLX CANVS 22IN BLU

## (undated) DEVICE — WEBRIL* CAST PADDING: Brand: DEROYAL

## (undated) DEVICE — TIDISHIELD UROLOGY DRAIN BAGS FROSTY VINYL STERILE FITS SIEMENS UROSKOP ACCESS 20 PER CASE: Brand: TIDISHIELD

## (undated) DEVICE — LEGGINGS, PAIR, CLEAR, STERILE: Brand: MEDLINE

## (undated) DEVICE — EPIDURAL TRAY: Brand: MEDLINE INDUSTRIES, INC.

## (undated) DEVICE — LOU CYSTO: Brand: MEDLINE INDUSTRIES, INC.

## (undated) DEVICE — DRSNG PAD ABD 8X10IN STRL

## (undated) DEVICE — COVER,TABLE,44X90,STERILE: Brand: MEDLINE

## (undated) DEVICE — CATH URETRL FLXITP POLLACK STD 5F 70CM

## (undated) DEVICE — NDL EPID TUOHY 18G 31/2IN

## (undated) DEVICE — GLV SURG BIOGEL LTX PF 8 1/2

## (undated) DEVICE — PK HIP PINNING 40

## (undated) DEVICE — GLV SURG BIOGEL LTX PF 7

## (undated) DEVICE — DRAPE,REIN 53X77,STERILE: Brand: MEDLINE

## (undated) DEVICE — DRSNG WND GZ CURAD OIL EMULSION 3X8IN LF STRL 1PK

## (undated) DEVICE — GLV SURG TRIUMPH CLASSIC PF LTX 8.5 STRL

## (undated) DEVICE — PREP SOL POVIDONE/IODINE BT 4OZ

## (undated) DEVICE — BNDG ELAS ELITE V/CLOSE 6IN 5YD LF STRL

## (undated) DEVICE — INTENDED FOR TISSUE SEPARATION, AND OTHER PROCEDURES THAT REQUIRE A SHARP SURGICAL BLADE TO PUNCTURE OR CUT.: Brand: BARD-PARKER ® CARBON RIB-BACK BLADES

## (undated) DEVICE — OCCLUSIVE GAUZE STRIP,3% BISMUTH TRIBROMOPHENATE IN PETROLATUM BLEND: Brand: XEROFORM

## (undated) DEVICE — APPL CHLORAPREP W/TINT 26ML ORNG

## (undated) DEVICE — SKIN PREP TRAY W/CHG: Brand: MEDLINE INDUSTRIES, INC.

## (undated) DEVICE — SUT MNCRYL 0 CT1 36IN Y946H

## (undated) DEVICE — KIRSCHNER WIRE, WITH TROCAR TIP, Ø 2.0 M
Type: IMPLANTABLE DEVICE | Site: FEMUR | Status: NON-FUNCTIONAL
Brand: KIRSCHNER WIRE
Removed: 2019-03-16

## (undated) DEVICE — BNDG ELAS CO-FLEX SLF ADHR 6IN 5YD LF STRL

## (undated) DEVICE — NITINOL STONE RETRIEVAL BASKET: Brand: ZERO TIP

## (undated) DEVICE — STPLR SKIN VISISTAT WD 35CT

## (undated) DEVICE — Device: Brand: NCB®

## (undated) DEVICE — SPNG GZ WOVN 4X4IN 12PLY 10/BX STRL

## (undated) DEVICE — SUT MNCRYL 3/0 PS2 18IN MCP497G

## (undated) DEVICE — SOL ISO/ALC RUB 70PCT 4OZ